# Patient Record
Sex: FEMALE | Race: WHITE | NOT HISPANIC OR LATINO
[De-identification: names, ages, dates, MRNs, and addresses within clinical notes are randomized per-mention and may not be internally consistent; named-entity substitution may affect disease eponyms.]

---

## 2017-01-10 ENCOUNTER — APPOINTMENT (OUTPATIENT)
Dept: PULMONOLOGY | Facility: CLINIC | Age: 77
End: 2017-01-10

## 2017-01-10 VITALS
HEART RATE: 76 BPM | WEIGHT: 272 LBS | HEIGHT: 65 IN | DIASTOLIC BLOOD PRESSURE: 70 MMHG | BODY MASS INDEX: 45.32 KG/M2 | OXYGEN SATURATION: 93 % | SYSTOLIC BLOOD PRESSURE: 140 MMHG

## 2017-02-09 ENCOUNTER — APPOINTMENT (OUTPATIENT)
Dept: FAMILY MEDICINE | Facility: CLINIC | Age: 77
End: 2017-02-09

## 2017-02-13 ENCOUNTER — APPOINTMENT (OUTPATIENT)
Dept: PULMONOLOGY | Facility: CLINIC | Age: 77
End: 2017-02-13

## 2017-02-13 VITALS
HEART RATE: 75 BPM | OXYGEN SATURATION: 91 % | SYSTOLIC BLOOD PRESSURE: 128 MMHG | DIASTOLIC BLOOD PRESSURE: 80 MMHG | WEIGHT: 275 LBS | BODY MASS INDEX: 45.76 KG/M2

## 2017-02-15 ENCOUNTER — FORM ENCOUNTER (OUTPATIENT)
Age: 77
End: 2017-02-15

## 2017-02-16 ENCOUNTER — OUTPATIENT (OUTPATIENT)
Dept: OUTPATIENT SERVICES | Facility: HOSPITAL | Age: 77
LOS: 1 days | End: 2017-02-16
Payer: MEDICARE

## 2017-02-16 ENCOUNTER — APPOINTMENT (OUTPATIENT)
Dept: CT IMAGING | Facility: CLINIC | Age: 77
End: 2017-02-16

## 2017-02-16 ENCOUNTER — APPOINTMENT (OUTPATIENT)
Dept: PULMONOLOGY | Facility: CLINIC | Age: 77
End: 2017-02-16

## 2017-02-16 DIAGNOSIS — Z96.651 PRESENCE OF RIGHT ARTIFICIAL KNEE JOINT: Chronic | ICD-10-CM

## 2017-02-16 DIAGNOSIS — Z90.49 ACQUIRED ABSENCE OF OTHER SPECIFIED PARTS OF DIGESTIVE TRACT: Chronic | ICD-10-CM

## 2017-02-16 DIAGNOSIS — Z98.89 OTHER SPECIFIED POSTPROCEDURAL STATES: Chronic | ICD-10-CM

## 2017-02-16 DIAGNOSIS — Z90.710 ACQUIRED ABSENCE OF BOTH CERVIX AND UTERUS: Chronic | ICD-10-CM

## 2017-02-16 DIAGNOSIS — J84.9 INTERSTITIAL PULMONARY DISEASE, UNSPECIFIED: ICD-10-CM

## 2017-02-16 DIAGNOSIS — Z00.8 ENCOUNTER FOR OTHER GENERAL EXAMINATION: ICD-10-CM

## 2017-02-16 PROCEDURE — 71250 CT THORAX DX C-: CPT

## 2017-02-20 ENCOUNTER — EMERGENCY (EMERGENCY)
Facility: HOSPITAL | Age: 77
LOS: 1 days | Discharge: HOME | End: 2017-02-20
Attending: EMERGENCY MEDICINE
Payer: MEDICARE

## 2017-02-20 VITALS
HEART RATE: 70 BPM | RESPIRATION RATE: 18 BRPM | TEMPERATURE: 98 F | DIASTOLIC BLOOD PRESSURE: 76 MMHG | OXYGEN SATURATION: 93 % | SYSTOLIC BLOOD PRESSURE: 154 MMHG

## 2017-02-20 VITALS
OXYGEN SATURATION: 91 % | DIASTOLIC BLOOD PRESSURE: 70 MMHG | RESPIRATION RATE: 20 BRPM | SYSTOLIC BLOOD PRESSURE: 140 MMHG | HEART RATE: 77 BPM

## 2017-02-20 DIAGNOSIS — E78.5 HYPERLIPIDEMIA, UNSPECIFIED: ICD-10-CM

## 2017-02-20 DIAGNOSIS — R05 COUGH: ICD-10-CM

## 2017-02-20 DIAGNOSIS — Z98.89 OTHER SPECIFIED POSTPROCEDURAL STATES: Chronic | ICD-10-CM

## 2017-02-20 DIAGNOSIS — Z79.82 LONG TERM (CURRENT) USE OF ASPIRIN: ICD-10-CM

## 2017-02-20 DIAGNOSIS — R06.02 SHORTNESS OF BREATH: ICD-10-CM

## 2017-02-20 DIAGNOSIS — E11.9 TYPE 2 DIABETES MELLITUS WITHOUT COMPLICATIONS: ICD-10-CM

## 2017-02-20 DIAGNOSIS — Z90.49 ACQUIRED ABSENCE OF OTHER SPECIFIED PARTS OF DIGESTIVE TRACT: ICD-10-CM

## 2017-02-20 DIAGNOSIS — R06.09 OTHER FORMS OF DYSPNEA: ICD-10-CM

## 2017-02-20 DIAGNOSIS — Z90.710 ACQUIRED ABSENCE OF BOTH CERVIX AND UTERUS: ICD-10-CM

## 2017-02-20 DIAGNOSIS — Z96.651 PRESENCE OF RIGHT ARTIFICIAL KNEE JOINT: ICD-10-CM

## 2017-02-20 DIAGNOSIS — Z98.890 OTHER SPECIFIED POSTPROCEDURAL STATES: ICD-10-CM

## 2017-02-20 DIAGNOSIS — I10 ESSENTIAL (PRIMARY) HYPERTENSION: ICD-10-CM

## 2017-02-20 DIAGNOSIS — F32.9 MAJOR DEPRESSIVE DISORDER, SINGLE EPISODE, UNSPECIFIED: ICD-10-CM

## 2017-02-20 DIAGNOSIS — Z96.651 PRESENCE OF RIGHT ARTIFICIAL KNEE JOINT: Chronic | ICD-10-CM

## 2017-02-20 DIAGNOSIS — R09.02 HYPOXEMIA: ICD-10-CM

## 2017-02-20 DIAGNOSIS — Z87.891 PERSONAL HISTORY OF NICOTINE DEPENDENCE: ICD-10-CM

## 2017-02-20 DIAGNOSIS — Z90.710 ACQUIRED ABSENCE OF BOTH CERVIX AND UTERUS: Chronic | ICD-10-CM

## 2017-02-20 DIAGNOSIS — Z90.49 ACQUIRED ABSENCE OF OTHER SPECIFIED PARTS OF DIGESTIVE TRACT: Chronic | ICD-10-CM

## 2017-02-20 DIAGNOSIS — G47.33 OBSTRUCTIVE SLEEP APNEA (ADULT) (PEDIATRIC): ICD-10-CM

## 2017-02-20 LAB
ALBUMIN SERPL ELPH-MCNC: 4.2 G/DL — SIGNIFICANT CHANGE UP (ref 3.3–5.2)
ALP SERPL-CCNC: 109 U/L — SIGNIFICANT CHANGE UP (ref 40–120)
ALT FLD-CCNC: 21 U/L — SIGNIFICANT CHANGE UP
ANION GAP SERPL CALC-SCNC: 13 MMOL/L — SIGNIFICANT CHANGE UP (ref 5–17)
APTT BLD: 26 SEC — LOW (ref 27.5–37.4)
AST SERPL-CCNC: 15 U/L — SIGNIFICANT CHANGE UP
BASE EXCESS BLDA CALC-SCNC: 2.3 MMOL/L — SIGNIFICANT CHANGE UP (ref -3–3)
BASOPHILS # BLD AUTO: 0.1 K/UL — SIGNIFICANT CHANGE UP (ref 0–0.2)
BASOPHILS NFR BLD AUTO: 0.8 % — SIGNIFICANT CHANGE UP (ref 0–2)
BILIRUB SERPL-MCNC: 0.4 MG/DL — SIGNIFICANT CHANGE UP (ref 0.4–2)
BLOOD GAS COMMENTS ARTERIAL: SIGNIFICANT CHANGE UP
BUN SERPL-MCNC: 27 MG/DL — HIGH (ref 8–20)
CALCIUM SERPL-MCNC: 9.7 MG/DL — SIGNIFICANT CHANGE UP (ref 8.6–10.2)
CHLORIDE SERPL-SCNC: 97 MMOL/L — LOW (ref 98–107)
CK SERPL-CCNC: 99 U/L — SIGNIFICANT CHANGE UP (ref 25–170)
CO2 SERPL-SCNC: 30 MMOL/L — HIGH (ref 22–29)
CREAT SERPL-MCNC: 1.07 MG/DL — SIGNIFICANT CHANGE UP (ref 0.5–1.3)
D DIMER BLD IA.RAPID-MCNC: 256 NG/ML DDU — HIGH
EOSINOPHIL # BLD AUTO: 0.4 K/UL — SIGNIFICANT CHANGE UP (ref 0–0.5)
EOSINOPHIL NFR BLD AUTO: 2.9 % — SIGNIFICANT CHANGE UP (ref 0–6)
GAS PNL BLDA: SIGNIFICANT CHANGE UP
GLUCOSE SERPL-MCNC: 169 MG/DL — HIGH (ref 70–115)
HCO3 BLDA-SCNC: 26 MMOL/L — SIGNIFICANT CHANGE UP (ref 20–26)
HCT VFR BLD CALC: 44.9 % — SIGNIFICANT CHANGE UP (ref 37–47)
HGB BLD-MCNC: 14.8 G/DL — SIGNIFICANT CHANGE UP (ref 12–16)
HOROWITZ INDEX BLDA+IHG-RTO: SIGNIFICANT CHANGE UP
INR BLD: 1 RATIO — SIGNIFICANT CHANGE UP (ref 0.88–1.16)
LYMPHOCYTES # BLD AUTO: 29.3 % — SIGNIFICANT CHANGE UP (ref 20–55)
LYMPHOCYTES # BLD AUTO: 3.6 K/UL — SIGNIFICANT CHANGE UP (ref 1–4.8)
MCHC RBC-ENTMCNC: 29.5 PG — SIGNIFICANT CHANGE UP (ref 27–31)
MCHC RBC-ENTMCNC: 33 G/DL — SIGNIFICANT CHANGE UP (ref 32–36)
MCV RBC AUTO: 89.4 FL — SIGNIFICANT CHANGE UP (ref 81–99)
MONOCYTES # BLD AUTO: 0.9 K/UL — HIGH (ref 0–0.8)
MONOCYTES NFR BLD AUTO: 7.6 % — SIGNIFICANT CHANGE UP (ref 3–10)
NEUTROPHILS # BLD AUTO: 7.2 K/UL — SIGNIFICANT CHANGE UP (ref 1.8–8)
NEUTROPHILS NFR BLD AUTO: 58.9 % — SIGNIFICANT CHANGE UP (ref 37–73)
NT-PROBNP SERPL-SCNC: 48 PG/ML — SIGNIFICANT CHANGE UP (ref 0–300)
PCO2 BLDA: 39 MMHG — SIGNIFICANT CHANGE UP (ref 35–45)
PH BLDA: 7.44 — SIGNIFICANT CHANGE UP (ref 7.35–7.45)
PLATELET # BLD AUTO: 321 K/UL — SIGNIFICANT CHANGE UP (ref 150–400)
PO2 BLDA: 63 MMHG — LOW (ref 83–108)
POTASSIUM SERPL-MCNC: 5 MMOL/L — SIGNIFICANT CHANGE UP (ref 3.5–5.3)
POTASSIUM SERPL-SCNC: 5 MMOL/L — SIGNIFICANT CHANGE UP (ref 3.5–5.3)
PROT SERPL-MCNC: 7.7 G/DL — SIGNIFICANT CHANGE UP (ref 6.6–8.7)
PROTHROM AB SERPL-ACNC: 11 SEC — SIGNIFICANT CHANGE UP (ref 10–13.1)
RBC # BLD: 5.02 M/UL — SIGNIFICANT CHANGE UP (ref 4.4–5.2)
RBC # FLD: 14.7 % — SIGNIFICANT CHANGE UP (ref 11–15.6)
SAO2 % BLDA: 93 % — LOW (ref 95–99)
SODIUM SERPL-SCNC: 140 MMOL/L — SIGNIFICANT CHANGE UP (ref 135–145)
TROPONIN T SERPL-MCNC: <0.01 NG/ML — SIGNIFICANT CHANGE UP (ref 0–0.06)
WBC # BLD: 12.2 K/UL — HIGH (ref 4.8–10.8)
WBC # FLD AUTO: 12.2 K/UL — HIGH (ref 4.8–10.8)

## 2017-02-20 PROCEDURE — 71275 CT ANGIOGRAPHY CHEST: CPT | Mod: 26

## 2017-02-20 PROCEDURE — 99053 MED SERV 10PM-8AM 24 HR FAC: CPT

## 2017-02-20 PROCEDURE — 83880 ASSAY OF NATRIURETIC PEPTIDE: CPT

## 2017-02-20 PROCEDURE — 99284 EMERGENCY DEPT VISIT MOD MDM: CPT | Mod: 25

## 2017-02-20 PROCEDURE — 85379 FIBRIN DEGRADATION QUANT: CPT

## 2017-02-20 PROCEDURE — 85027 COMPLETE CBC AUTOMATED: CPT

## 2017-02-20 PROCEDURE — 94640 AIRWAY INHALATION TREATMENT: CPT

## 2017-02-20 PROCEDURE — 84484 ASSAY OF TROPONIN QUANT: CPT

## 2017-02-20 PROCEDURE — 93010 ELECTROCARDIOGRAM REPORT: CPT

## 2017-02-20 PROCEDURE — 93970 EXTREMITY STUDY: CPT

## 2017-02-20 PROCEDURE — 85610 PROTHROMBIN TIME: CPT

## 2017-02-20 PROCEDURE — 93970 EXTREMITY STUDY: CPT | Mod: 26

## 2017-02-20 PROCEDURE — 82803 BLOOD GASES ANY COMBINATION: CPT

## 2017-02-20 PROCEDURE — 82550 ASSAY OF CK (CPK): CPT

## 2017-02-20 PROCEDURE — 99285 EMERGENCY DEPT VISIT HI MDM: CPT | Mod: 25

## 2017-02-20 PROCEDURE — 85730 THROMBOPLASTIN TIME PARTIAL: CPT

## 2017-02-20 PROCEDURE — 71010: CPT | Mod: 26

## 2017-02-20 PROCEDURE — 80053 COMPREHEN METABOLIC PANEL: CPT

## 2017-02-20 PROCEDURE — 93005 ELECTROCARDIOGRAM TRACING: CPT

## 2017-02-20 PROCEDURE — 71045 X-RAY EXAM CHEST 1 VIEW: CPT

## 2017-02-20 PROCEDURE — 99223 1ST HOSP IP/OBS HIGH 75: CPT

## 2017-02-20 PROCEDURE — 71275 CT ANGIOGRAPHY CHEST: CPT

## 2017-02-20 RX ORDER — ALBUTEROL 90 UG/1
2.5 AEROSOL, METERED ORAL ONCE
Qty: 0 | Refills: 0 | Status: COMPLETED | OUTPATIENT
Start: 2017-02-20 | End: 2017-02-20

## 2017-02-20 RX ORDER — IPRATROPIUM/ALBUTEROL SULFATE 18-103MCG
3 AEROSOL WITH ADAPTER (GRAM) INHALATION ONCE
Qty: 0 | Refills: 0 | Status: COMPLETED | OUTPATIENT
Start: 2017-02-20 | End: 2017-02-20

## 2017-02-20 RX ORDER — SODIUM CHLORIDE 9 MG/ML
3 INJECTION INTRAMUSCULAR; INTRAVENOUS; SUBCUTANEOUS ONCE
Qty: 0 | Refills: 0 | Status: COMPLETED | OUTPATIENT
Start: 2017-02-20 | End: 2017-02-20

## 2017-02-20 RX ADMIN — ALBUTEROL 2.5 MILLIGRAM(S): 90 AEROSOL, METERED ORAL at 09:15

## 2017-02-20 RX ADMIN — Medication 3 MILLILITER(S): at 09:15

## 2017-02-20 RX ADMIN — SODIUM CHLORIDE 3 MILLILITER(S): 9 INJECTION INTRAMUSCULAR; INTRAVENOUS; SUBCUTANEOUS at 09:10

## 2017-02-20 NOTE — ED ADULT NURSE NOTE - PMH
Cataract    Depression    Diabetes    DJD (degenerative joint disease)    Dyslipidemia    Edema of both legs    Hypertension    Venous insufficiency of both lower extremities

## 2017-02-20 NOTE — CONSULT NOTE ADULT - ASSESSMENT
Chronic dyspnea secondary to obesity and deconditioning with mild hypoxemia  If CTA normal could dischange with f/o with Dr White  Discussed with Dr Tovar

## 2017-02-20 NOTE — ED ADULT NURSE REASSESSMENT NOTE - NS ED NURSE REASSESS COMMENT FT1
90% o2 sat has been recent normal and being worked up by dr ruffin, pt states that is good for her and no distress on room air.

## 2017-02-20 NOTE — CONSULT NOTE ADULT - SUBJECTIVE AND OBJECTIVE BOX
PULMONARY CONSULT NOTE      KENDALL MERIDA-[id number]    Patient is a 76y old  Female who presents with a chief complaint of     HISTORY OF PRESENT ILLNESS:  75 yo w/f with hx of obesity, JAVON on CPAP14, chronic SOB x 6months worsening over past several days. w/u in our office showed normal PFT's. Denies cough or wheeze    MEDICATIONS  (STANDING):      MEDICATIONS  (PRN):      Allergies    ibuprofen (Swelling)  Motrin (Angioedema)    Intolerances        PAST MEDICAL & SURGICAL HISTORY:  Venous insufficiency of both lower extremities  Edema of both legs  Cataract  Depression  DJD (degenerative joint disease)  Dyslipidemia  Diabetes  Hypertension  H/O total hysterectomy  History of bladder surgery: failed sling placement  History of total knee replacement, right: 2015  S/P cholecystectomy  S/P arthroscopy of shoulder: R      FAMILY HISTORY:  Family history of coronary artery disease (Father, Mother, Sibling)      SOCIAL HISTORY  Smoking History:     REVIEW OF SYSTEMS:    CONSTITUTIONAL:  No fevers, chills, sweats    HEENT:  Eyes:  No diplopia or blurred vision. ENT:  No earache, sore throat or runny nose. sinus headache or postnasl drip    CARDIOVASCULAR:  No pressure, squeezing, tightness, or heaviness about the chest; no palpitations, leg swelling, orthopnea or PND    RESPIRATORY:  No cough, shortness of breath, PND or orthopnea. Mild SOBOE    GASTROINTESTINAL:  No abdominal pain, nausea, vomiting or diarrhea.    GENITOURINARY:  No dysuria, frequency or urgency.    NEUROLOGIC:  No paresthesias, fasciculations, seizures or weakness.    PSYCHIATRIC:  No disorder of thought or mood.    Vital Signs Last 24 Hrs  T(C): 36.9, Max: 36.9 (02-20 @ 11:25)  T(F): 98.4, Max: 98.4 (02-20 @ 11:25)  HR: 70 (70 - 70)  BP: 142/71 (142/71 - 154/76)  BP(mean): --  RR: 18 (18 - 18)  SpO2: 96% (93% - 96%)    PHYSICAL EXAMINATION:    GENERAL: The patient is a well-developed, well-nourished _____in no apparent distress.     HEENT: Head is normocephalic and atraumatic. Extraocular muscles are intact. Mucous membranes are moist.     NECK: Supple.     LUNGS: Clear to auscultation without wheezing, rales, or rhonchi. Respirations unlabored    HEART: Regular rate and rhythm without murmur.    ABDOMEN: Soft, nontender, and nondistended.  No hepatosplenomegaly is noted.    EXTREMITIES: Without any cyanosis, clubbing, rash, lesions or edema.    NEUROLOGIC: Grossly intact.      LABS:                        14.8   12.2  )-----------( 321      ( 20 Feb 2017 09:04 )             44.9     20 Feb 2017 09:04    140    |  97     |  27.0   ----------------------------<  169    5.0     |  30.0   |  1.07     Ca    9.7        20 Feb 2017 09:04    TPro  7.7    /  Alb  4.2    /  TBili  0.4    /  DBili  x      /  AST  15     /  ALT  21     /  AlkPhos  109    20 Feb 2017 09:04    PT/INR - ( 20 Feb 2017 09:04 )   PT: 11.0 sec;   INR: 1.00 ratio         PTT - ( 20 Feb 2017 09:04 )  PTT:26.0 sec    ABG - ( 20 Feb 2017 08:56 )  pH: 7.44  /  pCO2: 39    /  pO2: 63    / HCO3: 26    / Base Excess: 2.3   /  SaO2: 93                CARDIAC MARKERS ( 20 Feb 2017 09:04 )  x     / <0.01 ng/mL / 99 U/L / x     / x          D-Dimer Assay, Quantitative: 256 ng/mL DDU (02-20 @ 09:04)    Serum Pro-Brain Natriuretic Peptide: 48 pg/mL (02-20 @ 09:04)          MICROBIOLOGY:    RADIOLOGY & ADDITIONAL STUDIES:   EXAM:  US DPLX LWR EXT VEINS COMPL BI                          PROCEDURE DATE:  02/20/2017        INTERPRETATION:  HISTORY:  Shortness of breath.      TECHNIQUE:  Ultrasound of the bilateral lower extremities was performed   utilizing real time gray scale, color Doppler and augmentation technique.    COMPARISON EXAMINATION: 6/1/2016.      FINDINGS:  The bilateral common femoral veins, superficial femoral veins   and popliteal veins demonstrate normal color Doppler flow,   compressibility, and augmentation.    The calf veins are patent, demonstrating normal compressibility.    IMPRESSION: No evidence of deep venous thrombosis of either lower   extremity.        EXAM:  CT CHEST        PROCEDURE DATE:  02/16/2017        INTERPRETATION:  EXAMINATION: CT CHEST    CLINICAL INDICATION: Dyspnea.    TECHNIQUE: Noncontrast CT of the chest was obtained.  Prone and supine   images were obtained. MIP Images were reconstructed.    COMPARISON: 11/4/2016.    FINDINGS:     AIRWAYS AND LUNGS: The central tracheobronchial tree is patent.    Dependent reticular opacity is nearly resolved with prone imaging.   Minimal bilateral lower lobe bronchiectasis.    MEDIASTINUM AND PLEURA: There are no enlarged mediastinal, hilar or   axillary lymph nodes. The visualized portion of the thyroid gland is   heterogeneous. There is no pleural effusion. There is no pneumothorax.      HEART AND VESSELS: There is cardiomegaly.  There are atherosclerotic   calcifications of the aorta.  There is no pericardial effusion.         UPPER ABDOMEN: Images of the upper abdomen demonstrate cholecystectomy   clips..     BONES AND SOFT TISSUES: There are mild degenerative changes of the spine.    The soft tissues are unremarkable.    TUBES/LINES: None.    IMPRESSION:   Dependent reticular opacity nearly resolved with prone imaging and likely   representing dependent atelectasis.    Minimal bilateral lower lobe bronchiectasis.      JUDI FIELDS M.D., ATTENDING RADIOLOGIST  This document has been electronically signed. Feb 16 2017  2:50PM     EXAM:  CHEST SINGLE VIEW FRONTAL                          PROCEDURE DATE:  02/20/2017        INTERPRETATION:  CHEST AP PORTABLE:    History: sob.     Date and time of exam: 2/20/2017 9:08 AM.    Technique: A single AP view of the chest was obtained.    Comparison exam: 4/8/2015.    Findings:  The heart is enlarged. The lungs are clear. No hilar or mediastinal   abnormality. No evidence of a pleural effusion. There are degenerative   changes in the spine..    Impression:  Cardiomegaly. Clear lungs. Stable exam without significant change since   the previous study..                MALA PERDUE M.D., ATTENDING RADIOLOGIST  This document has been electronically signed. Feb 20 2017  9:12AM PULMONARY CONSULT NOTE      KENDALL MERIDA-[id number]    Patient is a 76y old  Female who presents with a chief complaint of     HISTORY OF PRESENT ILLNESS:    77 yo w/f with hx of obesity, JAVON on CPAP14, chronic SOB x 6months worsening over past several days. w/u in our office showed normal PFT's. Denies cough or wheeze. No pain    MEDICATIONS  (STANDING):      MEDICATIONS  (PRN):      Allergies    ibuprofen (Swelling)  Motrin (Angioedema)    Intolerances        PAST MEDICAL & SURGICAL HISTORY:  Venous insufficiency of both lower extremities  Edema of both legs  Cataract  Depression  DJD (degenerative joint disease)  Dyslipidemia  Diabetes  Hypertension  H/O total hysterectomy  History of bladder surgery: failed sling placement  History of total knee replacement, right: 2015  S/P cholecystectomy  S/P arthroscopy of shoulder: R      FAMILY HISTORY:  Family history of coronary artery disease (Father, Mother, Sibling)      SOCIAL HISTORY  Smoking History:     REVIEW OF SYSTEMS:    CONSTITUTIONAL:  No fevers, chills, sweats    HEENT:  Eyes:  No diplopia or blurred vision. ENT:  No earache, sore throat or runny nose. sinus headache or postnasl drip    CARDIOVASCULAR:  No pressure, squeezing, tightness, or heaviness about the chest; no palpitations, leg swelling, orthopnea or PND    RESPIRATORY:  No cough, shortness of breath, PND or orthopnea. Mild SOBOE    GASTROINTESTINAL:  No abdominal pain, nausea, vomiting or diarrhea.    GENITOURINARY:  No dysuria, frequency or urgency.    NEUROLOGIC:  No paresthesias, fasciculations, seizures or weakness.    PSYCHIATRIC:  No disorder of thought or mood.    Vital Signs Last 24 Hrs  T(C): 36.9, Max: 36.9 (02-20 @ 11:25)  T(F): 98.4, Max: 98.4 (02-20 @ 11:25)  HR: 70 (70 - 70)  BP: 142/71 (142/71 - 154/76)  BP(mean): --  RR: 18 (18 - 18)  SpO2: 96% (93% - 96%)    PHYSICAL EXAMINATION:    GENERAL: The patient is a well-developed, well-nourished obese in no apparent distress.     HEENT: Head is normocephalic and atraumatic. Extraocular muscles are intact. Mucous membranes are moist.     NECK: Supple.     LUNGS: Clear to auscultation without wheezing, rales, or rhonchi. Respirations unlabored    HEART: Regular rate and rhythm without murmur.    ABDOMEN: Soft, nontender, and nondistended.  No hepatosplenomegaly is noted.Obese    EXTREMITIES: Without any cyanosis, clubbing, rash, lesions or edema.    NEUROLOGIC: Grossly intact.      LABS:                        14.8   12.2  )-----------( 321      ( 20 Feb 2017 09:04 )             44.9     20 Feb 2017 09:04    140    |  97     |  27.0   ----------------------------<  169    5.0     |  30.0   |  1.07     Ca    9.7        20 Feb 2017 09:04    TPro  7.7    /  Alb  4.2    /  TBili  0.4    /  DBili  x      /  AST  15     /  ALT  21     /  AlkPhos  109    20 Feb 2017 09:04    PT/INR - ( 20 Feb 2017 09:04 )   PT: 11.0 sec;   INR: 1.00 ratio         PTT - ( 20 Feb 2017 09:04 )  PTT:26.0 sec    ABG - ( 20 Feb 2017 08:56 )  pH: 7.44  /  pCO2: 39    /  pO2: 63    / HCO3: 26    / Base Excess: 2.3   /  SaO2: 93                CARDIAC MARKERS ( 20 Feb 2017 09:04 )  x     / <0.01 ng/mL / 99 U/L / x     / x          D-Dimer Assay, Quantitative: 256 ng/mL DDU (02-20 @ 09:04)    Serum Pro-Brain Natriuretic Peptide: 48 pg/mL (02-20 @ 09:04)          MICROBIOLOGY:    RADIOLOGY & ADDITIONAL STUDIES:   EXAM:  US DPLX LWR EXT VEINS COMPL BI                          PROCEDURE DATE:  02/20/2017        INTERPRETATION:  HISTORY:  Shortness of breath.      TECHNIQUE:  Ultrasound of the bilateral lower extremities was performed   utilizing real time gray scale, color Doppler and augmentation technique.    COMPARISON EXAMINATION: 6/1/2016.      FINDINGS:  The bilateral common femoral veins, superficial femoral veins   and popliteal veins demonstrate normal color Doppler flow,   compressibility, and augmentation.    The calf veins are patent, demonstrating normal compressibility.    IMPRESSION: No evidence of deep venous thrombosis of either lower   extremity.        EXAM:  CT CHEST        PROCEDURE DATE:  02/16/2017        INTERPRETATION:  EXAMINATION: CT CHEST    CLINICAL INDICATION: Dyspnea.    TECHNIQUE: Noncontrast CT of the chest was obtained.  Prone and supine   images were obtained. MIP Images were reconstructed.    COMPARISON: 11/4/2016.    FINDINGS:     AIRWAYS AND LUNGS: The central tracheobronchial tree is patent.    Dependent reticular opacity is nearly resolved with prone imaging.   Minimal bilateral lower lobe bronchiectasis.    MEDIASTINUM AND PLEURA: There are no enlarged mediastinal, hilar or   axillary lymph nodes. The visualized portion of the thyroid gland is   heterogeneous. There is no pleural effusion. There is no pneumothorax.      HEART AND VESSELS: There is cardiomegaly.  There are atherosclerotic   calcifications of the aorta.  There is no pericardial effusion.         UPPER ABDOMEN: Images of the upper abdomen demonstrate cholecystectomy   clips..     BONES AND SOFT TISSUES: There are mild degenerative changes of the spine.    The soft tissues are unremarkable.    TUBES/LINES: None.    IMPRESSION:   Dependent reticular opacity nearly resolved with prone imaging and likely   representing dependent atelectasis.    Minimal bilateral lower lobe bronchiectasis.      JUDI FIELDS M.D., ATTENDING RADIOLOGIST  This document has been electronically signed. Feb 16 2017  2:50PM     EXAM:  CHEST SINGLE VIEW FRONTAL                          PROCEDURE DATE:  02/20/2017        INTERPRETATION:  CHEST AP PORTABLE:    History: sob.     Date and time of exam: 2/20/2017 9:08 AM.    Technique: A single AP view of the chest was obtained.    Comparison exam: 4/8/2015.    Findings:  The heart is enlarged. The lungs are clear. No hilar or mediastinal   abnormality. No evidence of a pleural effusion. There are degenerative   changes in the spine..    Impression:  Cardiomegaly. Clear lungs. Stable exam without significant change since   the previous study..                MALA PERDUE M.D., ATTENDING RADIOLOGIST  This document has been electronically signed. Feb 20 2017  9:12AM    CTA today- N0 gross emboli- official report pending      [facility] 7/15/16 Normal

## 2017-02-20 NOTE — ED ADULT TRIAGE NOTE - CHIEF COMPLAINT QUOTE
Patient arrived to ED today with c/o SOB.  Patient states she has had for the past couple of weeks that has increased.

## 2017-02-20 NOTE — ED ADULT NURSE NOTE - PSH
H/O total hysterectomy    History of bladder surgery  failed sling placement  History of total knee replacement, right  2015  S/P arthroscopy of shoulder  R  S/P cholecystectomy

## 2017-02-22 NOTE — ED PROVIDER NOTE - OBJECTIVE STATEMENT
77 yo female obese on home cpap comes to ed with worsening shortness of breath over the last 6 months; pt recently workup by Dr White of pulmonary  with ct scan of chest

## 2017-02-23 ENCOUNTER — APPOINTMENT (OUTPATIENT)
Dept: PULMONOLOGY | Facility: CLINIC | Age: 77
End: 2017-02-23

## 2017-02-28 RX ORDER — DOXYCYCLINE HYCLATE 100 MG/1
100 CAPSULE ORAL
Refills: 0 | Status: DISCONTINUED | COMMUNITY
End: 2017-02-28

## 2017-02-28 RX ORDER — BENZONATATE 200 MG/1
200 CAPSULE ORAL
Refills: 0 | Status: DISCONTINUED | COMMUNITY
Start: 2017-02-13 | End: 2017-02-28

## 2017-03-01 ENCOUNTER — APPOINTMENT (OUTPATIENT)
Dept: PULMONOLOGY | Facility: CLINIC | Age: 77
End: 2017-03-01

## 2017-03-01 VITALS
HEART RATE: 78 BPM | DIASTOLIC BLOOD PRESSURE: 80 MMHG | OXYGEN SATURATION: 93 % | RESPIRATION RATE: 16 BRPM | SYSTOLIC BLOOD PRESSURE: 128 MMHG

## 2017-03-16 ENCOUNTER — APPOINTMENT (OUTPATIENT)
Dept: FAMILY MEDICINE | Facility: CLINIC | Age: 77
End: 2017-03-16

## 2017-03-16 VITALS
SYSTOLIC BLOOD PRESSURE: 126 MMHG | HEIGHT: 65 IN | HEART RATE: 76 BPM | WEIGHT: 273 LBS | BODY MASS INDEX: 45.48 KG/M2 | DIASTOLIC BLOOD PRESSURE: 80 MMHG | OXYGEN SATURATION: 98 %

## 2017-03-16 DIAGNOSIS — Z23 ENCOUNTER FOR IMMUNIZATION: ICD-10-CM

## 2017-03-27 ENCOUNTER — APPOINTMENT (OUTPATIENT)
Dept: PULMONOLOGY | Facility: CLINIC | Age: 77
End: 2017-03-27

## 2017-03-27 VITALS — BODY MASS INDEX: 45.65 KG/M2 | WEIGHT: 274 LBS | HEIGHT: 65 IN | OXYGEN SATURATION: 92 % | HEART RATE: 77 BPM

## 2017-03-31 ENCOUNTER — APPOINTMENT (OUTPATIENT)
Dept: ORTHOPEDIC SURGERY | Facility: CLINIC | Age: 77
End: 2017-03-31

## 2017-03-31 VITALS
DIASTOLIC BLOOD PRESSURE: 54 MMHG | WEIGHT: 274 LBS | BODY MASS INDEX: 45.65 KG/M2 | HEIGHT: 65 IN | SYSTOLIC BLOOD PRESSURE: 109 MMHG | TEMPERATURE: 97.8 F | HEART RATE: 77 BPM

## 2017-04-20 ENCOUNTER — MEDICATION RENEWAL (OUTPATIENT)
Age: 77
End: 2017-04-20

## 2017-04-20 LAB
ALBUMIN SERPL ELPH-MCNC: 4.1 G/DL
ALP BLD-CCNC: 102 U/L
ALT SERPL-CCNC: 19 U/L
ANION GAP SERPL CALC-SCNC: 17 MMOL/L
AST SERPL-CCNC: 20 U/L
BILIRUB SERPL-MCNC: 0.5 MG/DL
BUN SERPL-MCNC: 24 MG/DL
CALCIUM SERPL-MCNC: 9.7 MG/DL
CHLORIDE SERPL-SCNC: 97 MMOL/L
CHOLEST SERPL-MCNC: 144 MG/DL
CHOLEST/HDLC SERPL: 2.4 RATIO
CO2 SERPL-SCNC: 24 MMOL/L
CREAT SERPL-MCNC: 1.23 MG/DL
GLUCOSE SERPL-MCNC: 148 MG/DL
HBA1C MFR BLD HPLC: 8.3 %
HDLC SERPL-MCNC: 59 MG/DL
LDLC SERPL CALC-MCNC: 68 MG/DL
POTASSIUM SERPL-SCNC: 4.9 MMOL/L
PROT SERPL-MCNC: 7.6 G/DL
SODIUM SERPL-SCNC: 138 MMOL/L
T4 FREE SERPL-MCNC: 1.4 NG/DL
TRIGL SERPL-MCNC: 83 MG/DL
TSH SERPL-ACNC: 2.45 UIU/ML

## 2017-06-19 ENCOUNTER — APPOINTMENT (OUTPATIENT)
Dept: FAMILY MEDICINE | Facility: CLINIC | Age: 77
End: 2017-06-19

## 2017-06-19 VITALS
DIASTOLIC BLOOD PRESSURE: 70 MMHG | SYSTOLIC BLOOD PRESSURE: 122 MMHG | WEIGHT: 273 LBS | HEIGHT: 65 IN | BODY MASS INDEX: 45.48 KG/M2 | HEART RATE: 68 BPM

## 2017-06-19 RX ORDER — PREDNISONE 10 MG/1
10 TABLET ORAL
Refills: 0 | Status: DISCONTINUED | COMMUNITY
End: 2017-06-19

## 2017-06-30 ENCOUNTER — APPOINTMENT (OUTPATIENT)
Dept: ORTHOPEDIC SURGERY | Facility: CLINIC | Age: 77
End: 2017-06-30

## 2017-06-30 ENCOUNTER — APPOINTMENT (OUTPATIENT)
Dept: CARDIOLOGY | Facility: CLINIC | Age: 77
End: 2017-06-30

## 2017-06-30 VITALS
WEIGHT: 273 LBS | DIASTOLIC BLOOD PRESSURE: 72 MMHG | SYSTOLIC BLOOD PRESSURE: 125 MMHG | HEIGHT: 65 IN | TEMPERATURE: 97.9 F | HEART RATE: 68 BPM | BODY MASS INDEX: 45.48 KG/M2

## 2017-07-10 ENCOUNTER — OTHER (OUTPATIENT)
Age: 77
End: 2017-07-10

## 2017-08-11 ENCOUNTER — APPOINTMENT (OUTPATIENT)
Dept: PULMONOLOGY | Facility: CLINIC | Age: 77
End: 2017-08-11
Payer: MEDICARE

## 2017-08-11 VITALS — BODY MASS INDEX: 45.1 KG/M2 | WEIGHT: 271 LBS

## 2017-08-11 VITALS — RESPIRATION RATE: 14 BRPM | OXYGEN SATURATION: 92 %

## 2017-08-11 VITALS — SYSTOLIC BLOOD PRESSURE: 120 MMHG | DIASTOLIC BLOOD PRESSURE: 76 MMHG

## 2017-08-11 PROCEDURE — 85018 HEMOGLOBIN: CPT | Mod: QW

## 2017-08-11 PROCEDURE — 94727 GAS DIL/WSHOT DETER LNG VOL: CPT

## 2017-08-11 PROCEDURE — 94010 BREATHING CAPACITY TEST: CPT

## 2017-08-11 PROCEDURE — 94729 DIFFUSING CAPACITY: CPT

## 2017-08-11 PROCEDURE — 99215 OFFICE O/P EST HI 40 MIN: CPT | Mod: 25

## 2017-08-15 LAB
ALBUMIN SERPL ELPH-MCNC: 3.9 G/DL
ALP BLD-CCNC: 101 U/L
ALT SERPL-CCNC: 17 U/L
ANION GAP SERPL CALC-SCNC: 14 MMOL/L
AST SERPL-CCNC: 18 U/L
BASOPHILS # BLD AUTO: 0.06 K/UL
BASOPHILS NFR BLD AUTO: 0.7 %
BILIRUB SERPL-MCNC: 0.4 MG/DL
BUN SERPL-MCNC: 16 MG/DL
CALCIUM SERPL-MCNC: 9.3 MG/DL
CHLORIDE SERPL-SCNC: 100 MMOL/L
CHOLEST SERPL-MCNC: 160 MG/DL
CHOLEST/HDLC SERPL: 3 RATIO
CO2 SERPL-SCNC: 24 MMOL/L
CREAT SERPL-MCNC: 0.98 MG/DL
EOSINOPHIL # BLD AUTO: 0.24 K/UL
EOSINOPHIL NFR BLD AUTO: 2.6 %
GLUCOSE SERPL-MCNC: 171 MG/DL
HCT VFR BLD CALC: 42.1 %
HDLC SERPL-MCNC: 54 MG/DL
HGB BLD-MCNC: 13.4 G/DL
IMM GRANULOCYTES NFR BLD AUTO: 0.1 %
LDLC SERPL CALC-MCNC: 81 MG/DL
LYMPHOCYTES # BLD AUTO: 2.4 K/UL
LYMPHOCYTES NFR BLD AUTO: 26.5 %
MAN DIFF?: NORMAL
MCHC RBC-ENTMCNC: 28.9 PG
MCHC RBC-ENTMCNC: 31.8 GM/DL
MCV RBC AUTO: 90.7 FL
MONOCYTES # BLD AUTO: 0.55 K/UL
MONOCYTES NFR BLD AUTO: 6.1 %
NEUTROPHILS # BLD AUTO: 5.81 K/UL
NEUTROPHILS NFR BLD AUTO: 64 %
PLATELET # BLD AUTO: 327 K/UL
POTASSIUM SERPL-SCNC: 4.7 MMOL/L
PROT SERPL-MCNC: 7.9 G/DL
RBC # BLD: 4.64 M/UL
RBC # FLD: 14.9 %
SODIUM SERPL-SCNC: 138 MMOL/L
TRIGL SERPL-MCNC: 127 MG/DL
WBC # FLD AUTO: 9.07 K/UL

## 2017-09-11 ENCOUNTER — APPOINTMENT (OUTPATIENT)
Dept: ORTHOPEDIC SURGERY | Facility: CLINIC | Age: 77
End: 2017-09-11
Payer: MEDICARE

## 2017-09-11 VITALS — WEIGHT: 270 LBS | HEIGHT: 65 IN | BODY MASS INDEX: 44.98 KG/M2

## 2017-09-11 VITALS — SYSTOLIC BLOOD PRESSURE: 141 MMHG | HEART RATE: 65 BPM | DIASTOLIC BLOOD PRESSURE: 66 MMHG

## 2017-09-11 PROCEDURE — 99214 OFFICE O/P EST MOD 30 MIN: CPT | Mod: 25

## 2017-09-11 PROCEDURE — 20610 DRAIN/INJ JOINT/BURSA W/O US: CPT | Mod: LT

## 2017-09-18 ENCOUNTER — APPOINTMENT (OUTPATIENT)
Dept: FAMILY MEDICINE | Facility: CLINIC | Age: 77
End: 2017-09-18
Payer: MEDICARE

## 2017-09-18 VITALS
BODY MASS INDEX: 45.1 KG/M2 | WEIGHT: 271 LBS | DIASTOLIC BLOOD PRESSURE: 80 MMHG | SYSTOLIC BLOOD PRESSURE: 130 MMHG | HEART RATE: 72 BPM

## 2017-09-18 VITALS — TEMPERATURE: 98.4 F

## 2017-09-18 PROCEDURE — G0008: CPT

## 2017-09-18 PROCEDURE — 99214 OFFICE O/P EST MOD 30 MIN: CPT | Mod: 25

## 2017-09-18 PROCEDURE — 90662 IIV NO PRSV INCREASED AG IM: CPT

## 2017-09-25 ENCOUNTER — APPOINTMENT (OUTPATIENT)
Dept: PULMONOLOGY | Facility: CLINIC | Age: 77
End: 2017-09-25
Payer: MEDICARE

## 2017-09-25 VITALS
RESPIRATION RATE: 16 BRPM | WEIGHT: 269 LBS | OXYGEN SATURATION: 93 % | SYSTOLIC BLOOD PRESSURE: 122 MMHG | BODY MASS INDEX: 44.76 KG/M2 | DIASTOLIC BLOOD PRESSURE: 80 MMHG | HEART RATE: 65 BPM

## 2017-09-25 PROCEDURE — 99214 OFFICE O/P EST MOD 30 MIN: CPT

## 2017-10-06 ENCOUNTER — APPOINTMENT (OUTPATIENT)
Dept: CARDIOLOGY | Facility: CLINIC | Age: 77
End: 2017-10-06
Payer: MEDICARE

## 2017-10-06 VITALS
HEART RATE: 62 BPM | OXYGEN SATURATION: 94 % | SYSTOLIC BLOOD PRESSURE: 133 MMHG | WEIGHT: 271 LBS | DIASTOLIC BLOOD PRESSURE: 66 MMHG | BODY MASS INDEX: 45.1 KG/M2

## 2017-10-06 PROCEDURE — 99214 OFFICE O/P EST MOD 30 MIN: CPT

## 2017-10-06 PROCEDURE — 93000 ELECTROCARDIOGRAM COMPLETE: CPT

## 2017-10-21 ENCOUNTER — NON-APPOINTMENT (OUTPATIENT)
Age: 77
End: 2017-10-21

## 2017-10-23 ENCOUNTER — INPATIENT (INPATIENT)
Facility: HOSPITAL | Age: 77
LOS: 0 days | Discharge: HOME | DRG: 313 | End: 2017-10-24
Attending: HOSPITALIST | Admitting: HOSPITALIST
Payer: MEDICARE

## 2017-10-23 VITALS
RESPIRATION RATE: 20 BRPM | WEIGHT: 250 LBS | HEART RATE: 70 BPM | DIASTOLIC BLOOD PRESSURE: 84 MMHG | HEIGHT: 65 IN | SYSTOLIC BLOOD PRESSURE: 158 MMHG | OXYGEN SATURATION: 93 % | TEMPERATURE: 98 F

## 2017-10-23 VITALS
RESPIRATION RATE: 20 BRPM | DIASTOLIC BLOOD PRESSURE: 79 MMHG | SYSTOLIC BLOOD PRESSURE: 154 MMHG | HEART RATE: 83 BPM | OXYGEN SATURATION: 90 %

## 2017-10-23 DIAGNOSIS — Z98.89 OTHER SPECIFIED POSTPROCEDURAL STATES: Chronic | ICD-10-CM

## 2017-10-23 DIAGNOSIS — Z90.49 ACQUIRED ABSENCE OF OTHER SPECIFIED PARTS OF DIGESTIVE TRACT: Chronic | ICD-10-CM

## 2017-10-23 DIAGNOSIS — Z96.651 PRESENCE OF RIGHT ARTIFICIAL KNEE JOINT: Chronic | ICD-10-CM

## 2017-10-23 DIAGNOSIS — Z90.710 ACQUIRED ABSENCE OF BOTH CERVIX AND UTERUS: Chronic | ICD-10-CM

## 2017-10-23 LAB
ALBUMIN SERPL ELPH-MCNC: 3.8 G/DL — SIGNIFICANT CHANGE UP (ref 3.3–5.2)
ALP SERPL-CCNC: 93 U/L — SIGNIFICANT CHANGE UP (ref 40–120)
ALT FLD-CCNC: 19 U/L — SIGNIFICANT CHANGE UP
ANION GAP SERPL CALC-SCNC: 17 MMOL/L — SIGNIFICANT CHANGE UP (ref 5–17)
AST SERPL-CCNC: 20 U/L — SIGNIFICANT CHANGE UP
BASOPHILS # BLD AUTO: 0.1 K/UL — SIGNIFICANT CHANGE UP (ref 0–0.2)
BASOPHILS NFR BLD AUTO: 0.6 % — SIGNIFICANT CHANGE UP (ref 0–2)
BILIRUB SERPL-MCNC: 0.4 MG/DL — SIGNIFICANT CHANGE UP (ref 0.4–2)
BUN SERPL-MCNC: 17 MG/DL — SIGNIFICANT CHANGE UP (ref 8–20)
CALCIUM SERPL-MCNC: 9.5 MG/DL — SIGNIFICANT CHANGE UP (ref 8.6–10.2)
CHLORIDE SERPL-SCNC: 100 MMOL/L — SIGNIFICANT CHANGE UP (ref 98–107)
CK MB CFR SERPL CALC: 3.9 NG/ML — SIGNIFICANT CHANGE UP (ref 0–6.7)
CK SERPL-CCNC: 168 U/L — SIGNIFICANT CHANGE UP (ref 25–170)
CO2 SERPL-SCNC: 24 MMOL/L — SIGNIFICANT CHANGE UP (ref 22–29)
CREAT SERPL-MCNC: 0.92 MG/DL — SIGNIFICANT CHANGE UP (ref 0.5–1.3)
EOSINOPHIL # BLD AUTO: 0.4 K/UL — SIGNIFICANT CHANGE UP (ref 0–0.5)
EOSINOPHIL NFR BLD AUTO: 3.4 % — SIGNIFICANT CHANGE UP (ref 0–6)
GLUCOSE SERPL-MCNC: 162 MG/DL — HIGH (ref 70–115)
HCT VFR BLD CALC: 39.3 % — SIGNIFICANT CHANGE UP (ref 37–47)
HGB BLD-MCNC: 13 G/DL — SIGNIFICANT CHANGE UP (ref 12–16)
LIDOCAIN IGE QN: 39 U/L — SIGNIFICANT CHANGE UP (ref 22–51)
LYMPHOCYTES # BLD AUTO: 29.2 % — SIGNIFICANT CHANGE UP (ref 20–55)
LYMPHOCYTES # BLD AUTO: 3.2 K/UL — SIGNIFICANT CHANGE UP (ref 1–4.8)
MCHC RBC-ENTMCNC: 29.8 PG — SIGNIFICANT CHANGE UP (ref 27–31)
MCHC RBC-ENTMCNC: 33.1 G/DL — SIGNIFICANT CHANGE UP (ref 32–36)
MCV RBC AUTO: 90.1 FL — SIGNIFICANT CHANGE UP (ref 81–99)
MONOCYTES # BLD AUTO: 0.8 K/UL — SIGNIFICANT CHANGE UP (ref 0–0.8)
MONOCYTES NFR BLD AUTO: 7 % — SIGNIFICANT CHANGE UP (ref 3–10)
NEUTROPHILS # BLD AUTO: 6.4 K/UL — SIGNIFICANT CHANGE UP (ref 1.8–8)
NEUTROPHILS NFR BLD AUTO: 59.4 % — SIGNIFICANT CHANGE UP (ref 37–73)
NT-PROBNP SERPL-SCNC: 112 PG/ML — SIGNIFICANT CHANGE UP (ref 0–300)
PLATELET # BLD AUTO: 315 K/UL — SIGNIFICANT CHANGE UP (ref 150–400)
POTASSIUM SERPL-MCNC: 3.9 MMOL/L — SIGNIFICANT CHANGE UP (ref 3.5–5.3)
POTASSIUM SERPL-SCNC: 3.9 MMOL/L — SIGNIFICANT CHANGE UP (ref 3.5–5.3)
PROT SERPL-MCNC: 7.4 G/DL — SIGNIFICANT CHANGE UP (ref 6.6–8.7)
RBC # BLD: 4.36 M/UL — LOW (ref 4.4–5.2)
RBC # FLD: 14.5 % — SIGNIFICANT CHANGE UP (ref 11–15.6)
SODIUM SERPL-SCNC: 141 MMOL/L — SIGNIFICANT CHANGE UP (ref 135–145)
TROPONIN T SERPL-MCNC: <0.01 NG/ML — SIGNIFICANT CHANGE UP (ref 0–0.06)
WBC # BLD: 10.8 K/UL — SIGNIFICANT CHANGE UP (ref 4.8–10.8)
WBC # FLD AUTO: 10.8 K/UL — SIGNIFICANT CHANGE UP (ref 4.8–10.8)

## 2017-10-23 PROCEDURE — 93971 EXTREMITY STUDY: CPT | Mod: 26,LT

## 2017-10-23 PROCEDURE — 99285 EMERGENCY DEPT VISIT HI MDM: CPT

## 2017-10-23 PROCEDURE — 93010 ELECTROCARDIOGRAM REPORT: CPT

## 2017-10-23 PROCEDURE — 71020: CPT | Mod: 26

## 2017-10-23 RX ORDER — SODIUM CHLORIDE 9 MG/ML
3 INJECTION INTRAMUSCULAR; INTRAVENOUS; SUBCUTANEOUS ONCE
Qty: 0 | Refills: 0 | Status: COMPLETED | OUTPATIENT
Start: 2017-10-23 | End: 2017-10-23

## 2017-10-23 RX ADMIN — SODIUM CHLORIDE 3 MILLILITER(S): 9 INJECTION INTRAMUSCULAR; INTRAVENOUS; SUBCUTANEOUS at 21:43

## 2017-10-23 NOTE — ED PROVIDER NOTE - OBJECTIVE STATEMENT
76 y/o F PMHx sleep apnea, DM, HTN presents to ED c/o constant CP. CP radiated to L arm with tingling to L side of face onset 1400 today. Associated Sx SOB x few weeks. Pt is on CPAP and oxygen at night for sleep apnea. Had a routine check up at cardiologist two weeks ago, normal. Was last catheterized one year ago. Denies back pain, dizziness, N/V/D, fever, chills, difficulty breathing, HA, and abd pain.  Cardiologist: Dr. Price  PCP: Dr. Leida Yeung 76 y/o F PMHx sleep apnea, DM, HTN presents to ED c/o constant CP. CP radiated to L arm with tingling to L side of face onset 1400 today. Associated Sx SOB x few weeks. Pt is on CPAP and oxygen at night for sleep apnea. Had a routine check up at cardiologist two weeks ago, normal. Was last catheterized one year ago. Denies back pain, dizziness, N/V/D, fever, chills, difficulty breathing, HA, and abd pain.  Cardiologist: Dr. Rodriguez  PCP: Dr. Leida Yeung

## 2017-10-23 NOTE — ED PROVIDER NOTE - PMH
Cataract    Depression    Diabetes    DJD (degenerative joint disease)    Dyslipidemia    Edema of both legs    Hypertension    Sleep apnea    Venous insufficiency of both lower extremities

## 2017-10-23 NOTE — ED PROVIDER NOTE - PROGRESS NOTE DETAILS
Went to evaluate pt, pt was not at designated location, will continue to look for her. Labs are as noted. Patient to be admitted for evaluation of acs. After discussion with hospitalist recent catheterization was reviewed showing no CAD. Given unchanged EKG pt to follow up out patient with Dr. Rodriguez. Pt was declined admission and will be DC'd home.

## 2017-10-23 NOTE — ED ADULT NURSE NOTE - OBJECTIVE STATEMENT
Pt received 2143, RN away with critical PT, and pt at CT. PT AOx3, resp even unlabored. Pt reports an episode of chest pain that radiated to left upper extremity. Pt denies chest pain at this time, Pt denies numbness or tingling, N/V/D. +ROM to all extremities, NSR, VSS. Pt awaiting test results, will continue to monitor

## 2017-10-23 NOTE — ED PROVIDER NOTE - MUSCULOSKELETAL, MLM
Spine is midline. No midline TTP. Swelling to L lower leg which pt states is chronic however, has L calf TTP which she states is intermittent.

## 2017-10-24 DIAGNOSIS — E78.5 HYPERLIPIDEMIA, UNSPECIFIED: ICD-10-CM

## 2017-10-24 DIAGNOSIS — I10 ESSENTIAL (PRIMARY) HYPERTENSION: ICD-10-CM

## 2017-10-24 DIAGNOSIS — F32.9 MAJOR DEPRESSIVE DISORDER, SINGLE EPISODE, UNSPECIFIED: ICD-10-CM

## 2017-10-24 DIAGNOSIS — E03.9 HYPOTHYROIDISM, UNSPECIFIED: ICD-10-CM

## 2017-10-24 DIAGNOSIS — G47.33 OBSTRUCTIVE SLEEP APNEA (ADULT) (PEDIATRIC): ICD-10-CM

## 2017-10-24 DIAGNOSIS — R07.89 OTHER CHEST PAIN: ICD-10-CM

## 2017-10-24 DIAGNOSIS — R07.9 CHEST PAIN, UNSPECIFIED: ICD-10-CM

## 2017-10-24 LAB
CK MB CFR SERPL CALC: 3.5 NG/ML — SIGNIFICANT CHANGE UP (ref 0–6.7)
CK SERPL-CCNC: 157 U/L — SIGNIFICANT CHANGE UP (ref 25–170)
TROPONIN T SERPL-MCNC: <0.01 NG/ML — SIGNIFICANT CHANGE UP (ref 0–0.06)

## 2017-10-24 PROCEDURE — 82550 ASSAY OF CK (CPK): CPT

## 2017-10-24 PROCEDURE — 82553 CREATINE MB FRACTION: CPT

## 2017-10-24 PROCEDURE — 84484 ASSAY OF TROPONIN QUANT: CPT

## 2017-10-24 PROCEDURE — 93971 EXTREMITY STUDY: CPT

## 2017-10-24 PROCEDURE — 83880 ASSAY OF NATRIURETIC PEPTIDE: CPT

## 2017-10-24 PROCEDURE — 80053 COMPREHEN METABOLIC PANEL: CPT

## 2017-10-24 PROCEDURE — 36415 COLL VENOUS BLD VENIPUNCTURE: CPT

## 2017-10-24 PROCEDURE — 85027 COMPLETE CBC AUTOMATED: CPT

## 2017-10-24 PROCEDURE — 93005 ELECTROCARDIOGRAM TRACING: CPT

## 2017-10-24 PROCEDURE — 71046 X-RAY EXAM CHEST 2 VIEWS: CPT

## 2017-10-24 PROCEDURE — 99284 EMERGENCY DEPT VISIT MOD MDM: CPT | Mod: 25

## 2017-10-24 PROCEDURE — 83690 ASSAY OF LIPASE: CPT

## 2017-10-24 NOTE — CONSULT NOTE ADULT - PROBLEM SELECTOR RECOMMENDATION 2
Patient with known hypoxia from COPD and has not been wearing her home oxygen. This is likely the cause of her symptoms. She was advised to cont. her home 02 and would benefit from portable concentrator when out of her house. She will call Dr. White this week for f/u/

## 2017-10-24 NOTE — CONSULT NOTE ADULT - ATTENDING COMMENTS
Patient agreeable with above plan. She has full capacity to make decisions and shows good insight and judgement to her proposed treatment plan

## 2017-10-24 NOTE — CONSULT NOTE ADULT - SUBJECTIVE AND OBJECTIVE BOX
CC: SOB/CP    HPI:Patient is a very pleasant 76 y/o female with history of obesity, COPD on home 02, JAVON on nocturnal CPAP, HTN, HLD, DJD. DM-2, s/p right TKR in 4/15, s/p LHC in 7/16 which showed normal coronaries, Hypothyroidism, Depression who presents with episode of left sided arm pain and chest pressure that occurred yesterday. Patient denies pleurisy, fever, chills, cough, trauma. She states she has SOB/dyspnea at baseline and was told recently by her Pulmonologist to wear her oxygen with activity and while doing activities at home. No hx of PE/DVT. No c/p calf pain or recent immobility. In the ED patient symptoms resolved, her vitals are stable with exception of hypoxia with room air sat of 92% and goes up to 95 percent on 2 liters, exam benign, her EKG was unchanged from prior, her initial Trop was neg, CXR unchanged, labs are WNL with exception of mild glucose elevation.     All: NSAIDS    Medhx: As above    Surghx: As above    ROS: As per HPI, remainder of ROS reported as neg.    Sochx: , lives with , no etoh, no illicit drugs, former smoker, no assistive devices, no reported falls    Famhx: Pos. for HTN    Home meds: Aspirin 81mg daily, Lexapro 20mg daily, Metformin 1000mg BID, Norvasc 5mg, GlipizidXR 2.5 daily, lipitor 20mg daily, Metoprolol 50mg BID, Synthroid 50mcg daily    PE:  VS: 154/79, 18, 70 regular, Afebrile, 93%RA, pain o/10  GEN: Awake, alert lying in bed in NAD  HEENT: NC/AT, EOMI, no icterus, no pallor, MM moist, no oral lesions  Neck: no JVD, supple, no adenopathy   CVS: S1S2 + no M/R/G  Pulm: No wheezes, no rales, diminished BS B/L bases in post lung fields, no pleurisy   ABD: obese, soft, BS + no rebound or guarding  EXTR:  old Right TKR scar, +1 edema B/L, no cyanosis, cap refill <3 sec.   Skin: no rashes, lesions   Neuro: AAOX4, NFD    LABS: Normal except for Glucose of 164  CXR: Intersitial thickening, unchanged from prior  EKG: NSR with LBBB, unchanged from prior in 2/17  LE doppler: No DVT  Cath 7/16: Normal coronaries

## 2017-10-24 NOTE — CONSULT NOTE ADULT - ASSESSMENT
78 y/o female with atypical CP, chronic AWAD from COPD w/ chronic hypoxia, HTN, HLD, obesity, DJD, Hypothyroidism, Depression

## 2017-10-24 NOTE — ED ADULT NURSE REASSESSMENT NOTE - NS ED NURSE REASSESS COMMENT FT1
Report received from offgoing RN
Report received from offgoing RN, chart as noted. MD Mayer @ bedside
pt not admitted to [facility], admitting made aware. pt d/c from hed

## 2017-10-24 NOTE — CONSULT NOTE ADULT - PROBLEM SELECTOR RECOMMENDATION 9
Patient with normal nuclear stress test and left heart catheterization last year, unchanged EKG from prior, neg. troponin. There is no suggestion of ACS at this time. Can obtain 2nd Trop/CK in 4 hours and if negative patient can be dcd home to f/u with PMD/Cardiologist as scheduled.

## 2017-11-02 ENCOUNTER — APPOINTMENT (OUTPATIENT)
Dept: PULMONOLOGY | Facility: CLINIC | Age: 77
End: 2017-11-02
Payer: MEDICARE

## 2017-11-02 VITALS
HEART RATE: 71 BPM | BODY MASS INDEX: 46.26 KG/M2 | DIASTOLIC BLOOD PRESSURE: 80 MMHG | WEIGHT: 278 LBS | SYSTOLIC BLOOD PRESSURE: 140 MMHG

## 2017-11-02 VITALS — OXYGEN SATURATION: 94 %

## 2017-11-02 PROCEDURE — 99215 OFFICE O/P EST HI 40 MIN: CPT

## 2017-12-11 ENCOUNTER — APPOINTMENT (OUTPATIENT)
Dept: FAMILY MEDICINE | Facility: CLINIC | Age: 77
End: 2017-12-11
Payer: MEDICARE

## 2017-12-11 VITALS
HEIGHT: 65 IN | WEIGHT: 277 LBS | BODY MASS INDEX: 46.15 KG/M2 | HEART RATE: 76 BPM | SYSTOLIC BLOOD PRESSURE: 124 MMHG | DIASTOLIC BLOOD PRESSURE: 70 MMHG

## 2017-12-11 PROCEDURE — 99214 OFFICE O/P EST MOD 30 MIN: CPT

## 2017-12-14 ENCOUNTER — APPOINTMENT (OUTPATIENT)
Dept: PULMONOLOGY | Facility: CLINIC | Age: 77
End: 2017-12-14

## 2017-12-15 ENCOUNTER — APPOINTMENT (OUTPATIENT)
Dept: ORTHOPEDIC SURGERY | Facility: CLINIC | Age: 77
End: 2017-12-15
Payer: MEDICARE

## 2017-12-15 VITALS
WEIGHT: 277 LBS | DIASTOLIC BLOOD PRESSURE: 68 MMHG | HEART RATE: 74 BPM | HEIGHT: 65 IN | SYSTOLIC BLOOD PRESSURE: 169 MMHG | BODY MASS INDEX: 46.15 KG/M2

## 2017-12-15 PROCEDURE — 20610 DRAIN/INJ JOINT/BURSA W/O US: CPT | Mod: 50

## 2017-12-15 PROCEDURE — 99214 OFFICE O/P EST MOD 30 MIN: CPT | Mod: 25

## 2018-01-11 ENCOUNTER — APPOINTMENT (OUTPATIENT)
Dept: PULMONOLOGY | Facility: CLINIC | Age: 78
End: 2018-01-11
Payer: MEDICARE

## 2018-01-11 VITALS — OXYGEN SATURATION: 95 % | HEART RATE: 60 BPM

## 2018-01-11 VITALS
SYSTOLIC BLOOD PRESSURE: 130 MMHG | DIASTOLIC BLOOD PRESSURE: 70 MMHG | HEART RATE: 69 BPM | BODY MASS INDEX: 44.93 KG/M2 | WEIGHT: 270 LBS

## 2018-01-11 PROCEDURE — 99214 OFFICE O/P EST MOD 30 MIN: CPT

## 2018-02-07 ENCOUNTER — APPOINTMENT (OUTPATIENT)
Dept: PULMONOLOGY | Facility: CLINIC | Age: 78
End: 2018-02-07
Payer: MEDICARE

## 2018-02-07 VITALS — HEART RATE: 63 BPM | OXYGEN SATURATION: 97 %

## 2018-02-07 VITALS — BODY MASS INDEX: 45.43 KG/M2 | DIASTOLIC BLOOD PRESSURE: 80 MMHG | SYSTOLIC BLOOD PRESSURE: 128 MMHG | WEIGHT: 273 LBS

## 2018-02-07 PROCEDURE — 99214 OFFICE O/P EST MOD 30 MIN: CPT

## 2018-03-12 ENCOUNTER — APPOINTMENT (OUTPATIENT)
Dept: FAMILY MEDICINE | Facility: CLINIC | Age: 78
End: 2018-03-12
Payer: MEDICARE

## 2018-03-12 VITALS
HEIGHT: 65 IN | DIASTOLIC BLOOD PRESSURE: 78 MMHG | BODY MASS INDEX: 45.82 KG/M2 | WEIGHT: 275 LBS | HEART RATE: 80 BPM | SYSTOLIC BLOOD PRESSURE: 130 MMHG

## 2018-03-12 DIAGNOSIS — Z23 ENCOUNTER FOR IMMUNIZATION: ICD-10-CM

## 2018-03-12 PROCEDURE — 99214 OFFICE O/P EST MOD 30 MIN: CPT

## 2018-03-14 PROBLEM — Z23 INFLUENZA VACCINATION GIVEN: Status: RESOLVED | Noted: 2017-09-18 | Resolved: 2018-03-14

## 2018-03-20 ENCOUNTER — APPOINTMENT (OUTPATIENT)
Dept: FAMILY MEDICINE | Facility: CLINIC | Age: 78
End: 2018-03-20
Payer: MEDICARE

## 2018-03-20 VITALS
SYSTOLIC BLOOD PRESSURE: 132 MMHG | HEART RATE: 75 BPM | OXYGEN SATURATION: 94 % | TEMPERATURE: 98.1 F | BODY MASS INDEX: 29.89 KG/M2 | HEIGHT: 65 IN | WEIGHT: 179.38 LBS | DIASTOLIC BLOOD PRESSURE: 74 MMHG

## 2018-03-20 PROCEDURE — 99214 OFFICE O/P EST MOD 30 MIN: CPT

## 2018-03-23 ENCOUNTER — APPOINTMENT (OUTPATIENT)
Dept: ORTHOPEDIC SURGERY | Facility: CLINIC | Age: 78
End: 2018-03-23
Payer: MEDICARE

## 2018-03-23 ENCOUNTER — APPOINTMENT (OUTPATIENT)
Dept: CARDIOLOGY | Facility: CLINIC | Age: 78
End: 2018-03-23
Payer: MEDICARE

## 2018-03-23 ENCOUNTER — NON-APPOINTMENT (OUTPATIENT)
Age: 78
End: 2018-03-23

## 2018-03-23 VITALS
HEART RATE: 76 BPM | WEIGHT: 279 LBS | DIASTOLIC BLOOD PRESSURE: 74 MMHG | SYSTOLIC BLOOD PRESSURE: 154 MMHG | BODY MASS INDEX: 46.43 KG/M2 | OXYGEN SATURATION: 93 %

## 2018-03-23 VITALS — DIASTOLIC BLOOD PRESSURE: 72 MMHG | SYSTOLIC BLOOD PRESSURE: 150 MMHG

## 2018-03-23 VITALS
SYSTOLIC BLOOD PRESSURE: 158 MMHG | HEART RATE: 74 BPM | DIASTOLIC BLOOD PRESSURE: 74 MMHG | WEIGHT: 179 LBS | HEIGHT: 65 IN | BODY MASS INDEX: 29.82 KG/M2

## 2018-03-23 PROCEDURE — 93000 ELECTROCARDIOGRAM COMPLETE: CPT

## 2018-03-23 PROCEDURE — 73562 X-RAY EXAM OF KNEE 3: CPT | Mod: 50

## 2018-03-23 PROCEDURE — 20610 DRAIN/INJ JOINT/BURSA W/O US: CPT | Mod: 50

## 2018-03-23 PROCEDURE — 99214 OFFICE O/P EST MOD 30 MIN: CPT | Mod: 25

## 2018-03-23 PROCEDURE — 99214 OFFICE O/P EST MOD 30 MIN: CPT

## 2018-03-23 RX ORDER — AZITHROMYCIN 250 MG/1
250 TABLET, FILM COATED ORAL
Qty: 6 | Refills: 0 | Status: DISCONTINUED | COMMUNITY
Start: 2018-03-20 | End: 2018-03-23

## 2018-04-06 ENCOUNTER — APPOINTMENT (OUTPATIENT)
Dept: CARDIOLOGY | Facility: CLINIC | Age: 78
End: 2018-04-06
Payer: MEDICARE

## 2018-04-06 PROCEDURE — 93306 TTE W/DOPPLER COMPLETE: CPT

## 2018-04-11 ENCOUNTER — APPOINTMENT (OUTPATIENT)
Dept: PULMONOLOGY | Facility: CLINIC | Age: 78
End: 2018-04-11
Payer: MEDICARE

## 2018-04-11 VITALS
HEART RATE: 69 BPM | WEIGHT: 269 LBS | BODY MASS INDEX: 44.76 KG/M2 | OXYGEN SATURATION: 90 % | SYSTOLIC BLOOD PRESSURE: 134 MMHG | DIASTOLIC BLOOD PRESSURE: 68 MMHG

## 2018-04-11 PROCEDURE — 99214 OFFICE O/P EST MOD 30 MIN: CPT

## 2018-06-05 ENCOUNTER — APPOINTMENT (OUTPATIENT)
Dept: FAMILY MEDICINE | Facility: CLINIC | Age: 78
End: 2018-06-05
Payer: MEDICARE

## 2018-06-05 VITALS
HEART RATE: 72 BPM | SYSTOLIC BLOOD PRESSURE: 135 MMHG | WEIGHT: 276 LBS | BODY MASS INDEX: 45.98 KG/M2 | DIASTOLIC BLOOD PRESSURE: 70 MMHG | HEIGHT: 65 IN

## 2018-06-05 DIAGNOSIS — J06.9 ACUTE UPPER RESPIRATORY INFECTION, UNSPECIFIED: ICD-10-CM

## 2018-06-05 PROCEDURE — 99214 OFFICE O/P EST MOD 30 MIN: CPT | Mod: 25

## 2018-06-05 PROCEDURE — 36415 COLL VENOUS BLD VENIPUNCTURE: CPT

## 2018-06-05 RX ORDER — DULAGLUTIDE 0.75 MG/.5ML
0.75 INJECTION, SOLUTION SUBCUTANEOUS
Refills: 0 | Status: DISCONTINUED | COMMUNITY
End: 2018-06-05

## 2018-06-05 NOTE — PLAN
[FreeTextEntry1] : HEALTHY DIET AND LIFESTYLE MODIFICATIONS\par HEALTHY WEIGHT LOSS \par MONITOR LAB WORK INCLUDING TFT'S\par CHECK VENOUS DUPLEX\par RX FOR NEXT THYROID SONOGRAM - TO ALSO DISCUSS WITH ENDOCRINOLOGY\par FOLLOW-UP ALL SPECIALISTS AS DIRECTED \par CALL WITH ANY QUESTIONS, CONCERNS OR CHANGES

## 2018-06-05 NOTE — REVIEW OF SYSTEMS
[Fatigue] : fatigue [Lower Ext Edema] : lower extremity edema [Fever] : no fever [Chills] : no chills [Discharge] : no discharge [Pain] : no pain [Chest Pain] : no chest pain [Palpitations] : no palpitations [Shortness Of Breath] : no shortness of breath [Cough] : no cough [Abdominal Pain] : no abdominal pain [Nausea] : no nausea [Diarrhea] : diarrhea [Vomiting] : no vomiting [Melena] : no melena [Dysuria] : no dysuria [Hematuria] : no hematuria [Headache] : no headache [Dizziness] : no dizziness [Fainting] : no fainting

## 2018-06-05 NOTE — COUNSELING
[Weight management counseling provided] : Weight management [Healthy eating counseling provided] : healthy eating [Activity counseling provided] : activity [Disease Management counseling provided] : disease management  [Walking] : Walking [Low Fat Diet] : Low fat diet [Low Salt Diet] : Low salt diet [Decrease Portions] : Decrease food portions [Take medications as directed] : Take medications as directed [None] : None [Good understanding] : Patient has a good understanding of lifestyle changes and the steps needed to achieve self management goals

## 2018-06-05 NOTE — PHYSICAL EXAM
[No Acute Distress] : no acute distress [Well Nourished] : well nourished [Well-Appearing] : well-appearing [Normal Sclera/Conjunctiva] : normal sclera/conjunctiva [PERRL] : pupils equal round and reactive to light [EOMI] : extraocular movements intact [Supple] : supple [No Lymphadenopathy] : no lymphadenopathy [No Respiratory Distress] : no respiratory distress  [Clear to Auscultation] : lungs were clear to auscultation bilaterally [No Accessory Muscle Use] : no accessory muscle use [Normal Rate] : normal rate  [Regular Rhythm] : with a regular rhythm [Normal S1, S2] : normal S1 and S2 [Grossly Normal Strength/Tone] : grossly normal strength/tone [Speech Grossly Normal] : speech grossly normal [Memory Grossly Normal] : memory grossly normal [Normal Insight/Judgement] : insight and judgment were intact [de-identified] : LEFT > RIGHT NON-PITTING EDEMA

## 2018-06-05 NOTE — HISTORY OF PRESENT ILLNESS
[Hyperlipidemia] : Hyperlipidemia [Hypertension] : Hypertension [Obesity] : Obesity [Patient was last seen on ___] : Patient was last seen on [unfilled] [FreeTextEntry6] : PRESENTING FOR FOLLOW-UP.  CHRONIC HISTORY OF HYPOTHYROIDISM, DEPRESSION, HYPERTENSION, HYPERLIPIDEMIA, JAVON AND DIABETES UNDER THE CARE OF ENDOCRINOLOGY.  HAD CORTISONE INJECTIONS BILATERAL KNEES WHICH HAS HELPED.  SAW CARDIOLOGY - HAD ECHOCARDIOGRAM.  SAW PULMONOLOGY IN FOLLOW-UP OF SLEEP APNEA.  HAS HAD SWELLING LEFT > RIGHT LOWER EXTREMITY CHRONICALLY WITH MULTIPLE PRIOR VENOUS DUPLEX.  LAST OVER A YEAR AGO.  ON LEVOTHYROXINE FOR HYPOTHYROIDISM.  PRIOR NODULE - DUE FOR FOLLOW-UP STUDY.  WAS PREVIOUSLY DONE WITH ENDOCRINOLOGY.  BLOOD PRESSURE CONTROLLED.  ON LIPITOR FOR CHOLESTEROL.  FEELING WELL ON LEXAPRO WITHOUT SIDE EFFECTS.  GETTING ALONG WELL WITH OTHERS.   HAS HAD NO HEADACHE, DIZZINESS, CHEST PAIN, SHORTNESS OF BREATH, GI OR URINARY COMPLAINTS.  NO OTHER COMPLAINTS TODAY.

## 2018-06-06 LAB
CREAT SPEC-SCNC: 61
HBA1C MFR BLD HPLC: 6.8
LDLC SERPL CALC-MCNC: 66
MICROALBUMIN 24H UR DL<=1MG/L-MCNC: 2.6
MICROALBUMIN/CREAT 24H UR-RTO: 43

## 2018-06-14 LAB
T4 FREE SERPL-MCNC: 1.5 NG/DL
TSH SERPL-ACNC: 2.16 UIU/ML

## 2018-07-16 ENCOUNTER — APPOINTMENT (OUTPATIENT)
Dept: ORTHOPEDIC SURGERY | Facility: CLINIC | Age: 78
End: 2018-07-16
Payer: MEDICARE

## 2018-07-16 VITALS
BODY MASS INDEX: 45.98 KG/M2 | DIASTOLIC BLOOD PRESSURE: 67 MMHG | WEIGHT: 276 LBS | HEIGHT: 65 IN | SYSTOLIC BLOOD PRESSURE: 136 MMHG | HEART RATE: 69 BPM

## 2018-07-16 PROCEDURE — 99213 OFFICE O/P EST LOW 20 MIN: CPT | Mod: 25

## 2018-07-16 PROCEDURE — 20610 DRAIN/INJ JOINT/BURSA W/O US: CPT | Mod: LT

## 2018-07-31 ENCOUNTER — FORM ENCOUNTER (OUTPATIENT)
Age: 78
End: 2018-07-31

## 2018-08-01 ENCOUNTER — APPOINTMENT (OUTPATIENT)
Dept: ULTRASOUND IMAGING | Facility: CLINIC | Age: 78
End: 2018-08-01

## 2018-08-01 ENCOUNTER — OUTPATIENT (OUTPATIENT)
Dept: OUTPATIENT SERVICES | Facility: HOSPITAL | Age: 78
LOS: 1 days | End: 2018-08-01
Payer: MEDICARE

## 2018-08-01 DIAGNOSIS — Z96.651 PRESENCE OF RIGHT ARTIFICIAL KNEE JOINT: Chronic | ICD-10-CM

## 2018-08-01 DIAGNOSIS — E04.1 NONTOXIC SINGLE THYROID NODULE: ICD-10-CM

## 2018-08-01 DIAGNOSIS — Z98.89 OTHER SPECIFIED POSTPROCEDURAL STATES: Chronic | ICD-10-CM

## 2018-08-01 DIAGNOSIS — Z90.710 ACQUIRED ABSENCE OF BOTH CERVIX AND UTERUS: Chronic | ICD-10-CM

## 2018-08-01 DIAGNOSIS — Z90.49 ACQUIRED ABSENCE OF OTHER SPECIFIED PARTS OF DIGESTIVE TRACT: Chronic | ICD-10-CM

## 2018-08-01 PROCEDURE — 93970 EXTREMITY STUDY: CPT | Mod: 26

## 2018-08-01 PROCEDURE — 93970 EXTREMITY STUDY: CPT

## 2018-08-01 PROCEDURE — 76536 US EXAM OF HEAD AND NECK: CPT | Mod: 26

## 2018-08-01 PROCEDURE — 76536 US EXAM OF HEAD AND NECK: CPT

## 2018-08-06 ENCOUNTER — FORM ENCOUNTER (OUTPATIENT)
Age: 78
End: 2018-08-06

## 2018-08-06 ENCOUNTER — APPOINTMENT (OUTPATIENT)
Dept: PULMONOLOGY | Facility: CLINIC | Age: 78
End: 2018-08-06
Payer: MEDICARE

## 2018-08-06 VITALS — OXYGEN SATURATION: 94 %

## 2018-08-06 VITALS
WEIGHT: 272 LBS | BODY MASS INDEX: 45.32 KG/M2 | SYSTOLIC BLOOD PRESSURE: 142 MMHG | HEART RATE: 76 BPM | DIASTOLIC BLOOD PRESSURE: 78 MMHG | OXYGEN SATURATION: 91 % | HEIGHT: 65 IN

## 2018-08-06 PROCEDURE — 99214 OFFICE O/P EST MOD 30 MIN: CPT

## 2018-08-07 ENCOUNTER — APPOINTMENT (OUTPATIENT)
Dept: CT IMAGING | Facility: CLINIC | Age: 78
End: 2018-08-07
Payer: MEDICARE

## 2018-08-07 ENCOUNTER — OUTPATIENT (OUTPATIENT)
Dept: OUTPATIENT SERVICES | Facility: HOSPITAL | Age: 78
LOS: 1 days | End: 2018-08-07
Payer: MEDICARE

## 2018-08-07 DIAGNOSIS — Z98.89 OTHER SPECIFIED POSTPROCEDURAL STATES: Chronic | ICD-10-CM

## 2018-08-07 DIAGNOSIS — Z90.49 ACQUIRED ABSENCE OF OTHER SPECIFIED PARTS OF DIGESTIVE TRACT: Chronic | ICD-10-CM

## 2018-08-07 DIAGNOSIS — Z90.710 ACQUIRED ABSENCE OF BOTH CERVIX AND UTERUS: Chronic | ICD-10-CM

## 2018-08-07 DIAGNOSIS — Z96.651 PRESENCE OF RIGHT ARTIFICIAL KNEE JOINT: Chronic | ICD-10-CM

## 2018-08-07 DIAGNOSIS — R09.02 HYPOXEMIA: ICD-10-CM

## 2018-08-07 PROCEDURE — 82565 ASSAY OF CREATININE: CPT

## 2018-08-07 PROCEDURE — 71275 CT ANGIOGRAPHY CHEST: CPT | Mod: 26

## 2018-08-07 PROCEDURE — 71275 CT ANGIOGRAPHY CHEST: CPT

## 2018-08-14 ENCOUNTER — OTHER (OUTPATIENT)
Age: 78
End: 2018-08-14

## 2018-09-06 ENCOUNTER — APPOINTMENT (OUTPATIENT)
Dept: FAMILY MEDICINE | Facility: CLINIC | Age: 78
End: 2018-09-06
Payer: MEDICARE

## 2018-09-06 VITALS
HEART RATE: 70 BPM | DIASTOLIC BLOOD PRESSURE: 68 MMHG | OXYGEN SATURATION: 93 % | BODY MASS INDEX: 45.15 KG/M2 | SYSTOLIC BLOOD PRESSURE: 124 MMHG | WEIGHT: 271 LBS | HEIGHT: 65 IN

## 2018-09-06 PROCEDURE — 99214 OFFICE O/P EST MOD 30 MIN: CPT

## 2018-09-07 NOTE — PLAN
[FreeTextEntry1] : RECENT THYROID SONOGRAM REVIEWED\par RX FOR FNA BIOPSY\par TO FOLLOW-UP WITH ENDOCRINOLOGIST\par HEALTHY DIET AND LIFESTYLE MODIFICATIONS\par HEALTHY WEIGHT LOSS \par BLOOD PRESSURE CONTROLLED\par RECENT LAB WORK REVIEWED FROM ENDOCRINOLOGIST\par FOLLOW-UP ALL SPECIALISTS AS DIRECTED \par FLU VACCINE IN THE FALL RECOMMENDED\par CALL WITH ANY QUESTIONS, CONCERNS OR CHANGES

## 2018-09-07 NOTE — COUNSELING
[Weight management counseling provided] : Weight management [Healthy eating counseling provided] : healthy eating [Activity counseling provided] : activity [Behavioral health counseling provided] : behavioral health  [Low Fat Diet] : Low fat diet [Decrease Portions] : Decrease food portions [Low Salt Diet] : Low salt diet [Walking] : Walking [Engage in a relaxing activity] : Engage in a relaxing activity [None] : None [Good understanding] : Patient has a good understanding of lifestyle changes and the steps needed to achieve self management goals

## 2018-09-07 NOTE — REVIEW OF SYSTEMS
[Fever] : no fever [Chills] : no chills [Fatigue] : no fatigue [Discharge] : no discharge [Pain] : no pain [Chest Pain] : no chest pain [Palpitations] : no palpitations [Shortness Of Breath] : no shortness of breath [Cough] : no cough [Abdominal Pain] : no abdominal pain [Nausea] : no nausea [Diarrhea] : diarrhea [Vomiting] : no vomiting [Melena] : no melena [Dysuria] : no dysuria [Hematuria] : no hematuria [Headache] : no headache [Dizziness] : no dizziness [Fainting] : no fainting [Suicidal] : not suicidal [Anxiety] : no anxiety

## 2018-09-07 NOTE — HISTORY OF PRESENT ILLNESS
[FreeTextEntry1] : F/U HTN, CHOLESTEROL [de-identified] : PRESENTING FOR FOLLOW-UP. CHRONIC HISTORY OF HYPOTHYROIDISM, DEPRESSION, HYPERTENSION, HYPERLIPIDEMIA, JAVON AND DIABETES UNDER THE CARE OF ENDOCRINOLOGY.  BLOOD PRESSURE WELL CONTROLLED ON CURRENT MEDICATIONS.  TAKING LIPITOR FOR CHOLESTEROL.  DIET WITH SOME ROOM FOR IMPROVEMENT.  NOT ROUTINELY EXERCISING.  DENIES SIGNS OR SYMPTOMS OF THYROID DISEASE ON LEVOTHYROXINE.  HAD THYROID SONOGRAM FOR THYROID NODULE.  GOOD SUPPORT SYSTEM AND TAKING LEXAPRO AS DIRECTED FOR DEPRESSION.  HAS U[place]OMING TRIP WITH HER  TO [place].  NO SUICIDAL/HOMICIDAL IDEATIONS OR PLANS.  FOLLOWS UP WITH MULTIPLE SPECIALISTS ROUTINELY.

## 2018-09-07 NOTE — PHYSICAL EXAM
[No Acute Distress] : no acute distress [Well Nourished] : well nourished [Well-Appearing] : well-appearing [Supple] : supple [No Lymphadenopathy] : no lymphadenopathy [No Respiratory Distress] : no respiratory distress  [Clear to Auscultation] : lungs were clear to auscultation bilaterally [No Accessory Muscle Use] : no accessory muscle use [Normal Rate] : normal rate  [Regular Rhythm] : with a regular rhythm [Normal S1, S2] : normal S1 and S2 [Soft] : abdomen soft [Non Tender] : non-tender [Normal Bowel Sounds] : normal bowel sounds [No Joint Swelling] : no joint swelling [Grossly Normal Strength/Tone] : grossly normal strength/tone [Speech Grossly Normal] : speech grossly normal [Normal Mood] : the mood was normal [Normal Insight/Judgement] : insight and judgment were intact

## 2018-09-10 ENCOUNTER — APPOINTMENT (OUTPATIENT)
Dept: ORTHOPEDIC SURGERY | Facility: CLINIC | Age: 78
End: 2018-09-10
Payer: MEDICARE

## 2018-09-10 VITALS
DIASTOLIC BLOOD PRESSURE: 74 MMHG | HEIGHT: 65 IN | SYSTOLIC BLOOD PRESSURE: 147 MMHG | WEIGHT: 270 LBS | TEMPERATURE: 98.8 F | HEART RATE: 63 BPM | BODY MASS INDEX: 44.98 KG/M2

## 2018-09-10 PROCEDURE — 99214 OFFICE O/P EST MOD 30 MIN: CPT | Mod: 25

## 2018-09-10 PROCEDURE — 20610 DRAIN/INJ JOINT/BURSA W/O US: CPT | Mod: 50

## 2018-10-05 ENCOUNTER — APPOINTMENT (OUTPATIENT)
Dept: CARDIOLOGY | Facility: CLINIC | Age: 78
End: 2018-10-05
Payer: MEDICARE

## 2018-10-05 ENCOUNTER — NON-APPOINTMENT (OUTPATIENT)
Age: 78
End: 2018-10-05

## 2018-10-05 VITALS
HEART RATE: 58 BPM | DIASTOLIC BLOOD PRESSURE: 68 MMHG | WEIGHT: 269 LBS | SYSTOLIC BLOOD PRESSURE: 132 MMHG | BODY MASS INDEX: 44.76 KG/M2 | OXYGEN SATURATION: 93 %

## 2018-10-05 DIAGNOSIS — I34.0 NONRHEUMATIC MITRAL (VALVE) INSUFFICIENCY: ICD-10-CM

## 2018-10-05 PROCEDURE — 99214 OFFICE O/P EST MOD 30 MIN: CPT

## 2018-10-05 PROCEDURE — 93000 ELECTROCARDIOGRAM COMPLETE: CPT

## 2018-10-05 RX ORDER — ALBUTEROL SULFATE 108 UG/1
108 (90 BASE) AEROSOL, METERED RESPIRATORY (INHALATION)
Qty: 1 | Refills: 0 | Status: DISCONTINUED | COMMUNITY
Start: 2018-03-20 | End: 2018-10-05

## 2018-10-16 ENCOUNTER — APPOINTMENT (OUTPATIENT)
Dept: PULMONOLOGY | Facility: CLINIC | Age: 78
End: 2018-10-16
Payer: MEDICARE

## 2018-10-16 VITALS — HEART RATE: 59 BPM | OXYGEN SATURATION: 92 %

## 2018-10-16 VITALS — DIASTOLIC BLOOD PRESSURE: 68 MMHG | SYSTOLIC BLOOD PRESSURE: 122 MMHG

## 2018-10-16 VITALS — BODY MASS INDEX: 44.76 KG/M2 | WEIGHT: 269 LBS

## 2018-10-16 DIAGNOSIS — Z87.898 PERSONAL HISTORY OF OTHER SPECIFIED CONDITIONS: ICD-10-CM

## 2018-10-16 PROCEDURE — 99214 OFFICE O/P EST MOD 30 MIN: CPT

## 2018-12-06 ENCOUNTER — APPOINTMENT (OUTPATIENT)
Dept: FAMILY MEDICINE | Facility: CLINIC | Age: 78
End: 2018-12-06
Payer: COMMERCIAL

## 2018-12-06 VITALS — SYSTOLIC BLOOD PRESSURE: 124 MMHG | DIASTOLIC BLOOD PRESSURE: 84 MMHG

## 2018-12-06 VITALS
BODY MASS INDEX: 44.65 KG/M2 | HEART RATE: 75 BPM | HEIGHT: 65 IN | OXYGEN SATURATION: 90 % | DIASTOLIC BLOOD PRESSURE: 72 MMHG | WEIGHT: 268 LBS | SYSTOLIC BLOOD PRESSURE: 140 MMHG

## 2018-12-06 PROCEDURE — 36415 COLL VENOUS BLD VENIPUNCTURE: CPT

## 2018-12-06 PROCEDURE — 99214 OFFICE O/P EST MOD 30 MIN: CPT | Mod: 25

## 2018-12-06 NOTE — PHYSICAL EXAM
[No Acute Distress] : no acute distress [Well Nourished] : well nourished [Well-Appearing] : well-appearing [Normal Sclera/Conjunctiva] : normal sclera/conjunctiva [PERRL] : pupils equal round and reactive to light [EOMI] : extraocular movements intact [Supple] : supple [No Lymphadenopathy] : no lymphadenopathy [No Respiratory Distress] : no respiratory distress  [Clear to Auscultation] : lungs were clear to auscultation bilaterally [No Accessory Muscle Use] : no accessory muscle use [Normal Rate] : normal rate  [Regular Rhythm] : with a regular rhythm [Normal S1, S2] : normal S1 and S2 [Soft] : abdomen soft [Non Tender] : non-tender [Normal Bowel Sounds] : normal bowel sounds [Speech Grossly Normal] : speech grossly normal [Normal Mood] : the mood was normal [Normal Insight/Judgement] : insight and judgment were intact [Right Foot Was Examined] : Right foot ~C was examined [Left Foot Was Examined] : left foot ~C was examined [None] : no ulcers in either foot were found [] : both feet

## 2018-12-06 NOTE — COUNSELING
[Weight management counseling provided] : Weight management [Healthy eating counseling provided] : healthy eating [Activity counseling provided] : activity [Behavioral health counseling provided] : behavioral health  [Low Fat Diet] : Low fat diet [Decrease Portions] : Decrease food portions [Low Salt Diet] : Low salt diet [Walking] : Walking [Engage in a relaxing activity] : Engage in a relaxing activity [Patient motivation] : Patient motivation [Good understanding] : Patient has a good understanding of lifestyle changes and the steps needed to achieve self management goals

## 2018-12-06 NOTE — HISTORY OF PRESENT ILLNESS
[FreeTextEntry1] : f/u htn [de-identified] : PRESENTING FOR FOLLOW-UP. CHRONIC HISTORY OF HYPOTHYROIDISM, DEPRESSION, HYPERTENSION, HYPERLIPIDEMIA, JAVON AND DIABETES UNDER THE CARE OF ENDOCRINOLOGY.  THYROID NODULE.  STILL NEEDS TO GO FOR THYROID BIOPSY.  SEEING A NEW ENDOCRINOLOGIST IN TWO MONTHS.  APPOINTMENT WITH ENT NEXT WEEK.  BLOOD PRESSURE USUALLY WELL CONTROLLED ON CURRENT MEDICATIONS.  ON LIPITOR FOR CHOLESTEROL.  ON LEXAPRO FOR DEPRESSION.  TAKING DAILY AS DIRECTED.  GETTING ALONG WITH OTHERS.  SLEEPING COULD BE IMPROVED.  APPETITE GOOD.  NO SUICIDAL/HOMICIDAL IDEATIONS OR PLANS.  SAW CARDIOLOGY IN OCTOBER.  NO CHANGES WERE MADE.\par \par ENDOCRINOLOGY: DR. ROBLEDO

## 2018-12-06 NOTE — REVIEW OF SYSTEMS
[Fever] : no fever [Chills] : no chills [Fatigue] : no fatigue [Discharge] : no discharge [Pain] : no pain [Chest Pain] : no chest pain [Palpitations] : no palpitations [Shortness Of Breath] : no shortness of breath [Cough] : no cough [Abdominal Pain] : no abdominal pain [Diarrhea] : diarrhea [Vomiting] : no vomiting [Melena] : no melena [Dysuria] : no dysuria [Hematuria] : no hematuria [Headache] : no headache [Dizziness] : no dizziness [Fainting] : no fainting [Suicidal] : not suicidal

## 2018-12-06 NOTE — PLAN
[FreeTextEntry1] : WILL NO LONGER SEE GYN OR GO FOR COLONOSCOPY\par HEALTHY DIET AND LIFESTYLE MODIFICATIONS\par HEALTHY WEIGHT LOSS \par DOING WELL ON LEXAPRO - WILL CONTINUE\par MONITOR BLOOD PRESSURE\par CHECK ROUTINE LAB WORK\par RE-ADVISED FNA THYROID - AWARE TO EVALUATE FOR POTENTIAL CANCER\par FOLLOW-UP ALL SPECIALISTS AS DIRECTED \par CALL WITH ANY QUESTIONS, CONCERNS OR CHANGES

## 2018-12-14 LAB
ALBUMIN SERPL ELPH-MCNC: 4.3 G/DL
ALP BLD-CCNC: 98 U/L
ALT SERPL-CCNC: 19 U/L
ANION GAP SERPL CALC-SCNC: 14 MMOL/L
AST SERPL-CCNC: 18 U/L
BILIRUB SERPL-MCNC: 0.5 MG/DL
BUN SERPL-MCNC: 18 MG/DL
CALCIUM SERPL-MCNC: 10 MG/DL
CHLORIDE SERPL-SCNC: 100 MMOL/L
CO2 SERPL-SCNC: 26 MMOL/L
CREAT SERPL-MCNC: 1.01 MG/DL
GLUCOSE SERPL-MCNC: 150 MG/DL
HBA1C MFR BLD HPLC: 6.7 %
POTASSIUM SERPL-SCNC: 4.8 MMOL/L
PROT SERPL-MCNC: 7.4 G/DL
SODIUM SERPL-SCNC: 141 MMOL/L
T4 FREE SERPL-MCNC: 1.8 NG/DL
TSH SERPL-ACNC: 2.47 UIU/ML

## 2018-12-19 ENCOUNTER — APPOINTMENT (OUTPATIENT)
Dept: ORTHOPEDIC SURGERY | Facility: CLINIC | Age: 78
End: 2018-12-19
Payer: COMMERCIAL

## 2018-12-19 VITALS
WEIGHT: 268 LBS | SYSTOLIC BLOOD PRESSURE: 163 MMHG | HEIGHT: 65 IN | DIASTOLIC BLOOD PRESSURE: 69 MMHG | BODY MASS INDEX: 44.65 KG/M2 | HEART RATE: 61 BPM

## 2018-12-19 PROCEDURE — 99214 OFFICE O/P EST MOD 30 MIN: CPT | Mod: 25

## 2018-12-19 PROCEDURE — 20610 DRAIN/INJ JOINT/BURSA W/O US: CPT | Mod: LT

## 2019-01-02 ENCOUNTER — TRANSCRIPTION ENCOUNTER (OUTPATIENT)
Age: 79
End: 2019-01-02

## 2019-01-15 ENCOUNTER — RECORD ABSTRACTING (OUTPATIENT)
Age: 79
End: 2019-01-15

## 2019-03-08 ENCOUNTER — APPOINTMENT (OUTPATIENT)
Dept: DERMATOLOGY | Facility: CLINIC | Age: 79
End: 2019-03-08

## 2019-03-08 ENCOUNTER — RECORD ABSTRACTING (OUTPATIENT)
Age: 79
End: 2019-03-08

## 2019-03-14 ENCOUNTER — APPOINTMENT (OUTPATIENT)
Dept: FAMILY MEDICINE | Facility: CLINIC | Age: 79
End: 2019-03-14

## 2019-04-02 ENCOUNTER — APPOINTMENT (OUTPATIENT)
Dept: ORTHOPEDIC SURGERY | Facility: CLINIC | Age: 79
End: 2019-04-02
Payer: MEDICARE

## 2019-04-02 ENCOUNTER — MEDICATION RENEWAL (OUTPATIENT)
Age: 79
End: 2019-04-02

## 2019-04-02 VITALS
BODY MASS INDEX: 44.65 KG/M2 | WEIGHT: 268 LBS | SYSTOLIC BLOOD PRESSURE: 156 MMHG | HEIGHT: 65 IN | HEART RATE: 68 BPM | DIASTOLIC BLOOD PRESSURE: 65 MMHG

## 2019-04-02 PROCEDURE — 99214 OFFICE O/P EST MOD 30 MIN: CPT | Mod: 25

## 2019-04-02 PROCEDURE — 20610 DRAIN/INJ JOINT/BURSA W/O US: CPT | Mod: LT

## 2019-04-02 NOTE — PROCEDURE
[de-identified] : I injected the patient's left knee with cortisone today.\par \par I discussed at length with the patient the planned steroid and lidocaine injection. The risks, benefits, convalescence and alternatives were reviewed. The possible side effects discussed included but were not limited to: pain, swelling, heat, bleeding, and redness. Symptoms are generally mild but if they are extensive then contact the office. Giving pain relievers by mouth such as NSAIDs or Tylenol can generally treat the reactions to steroid and lidocaine. Rare cases of infection have been noted. Rash, hives and itching may occur post injection. If you have muscle pain or cramps, flushing and or swelling of the face, rapid heart beat, nausea, dizziness, fever, chills, headache, difficulty breathing, swelling in the arms or legs, or have a prickly feeling of your skin, contact a health care provider immediately. Following this discussion, the knee was prepped with Alcohol and under sterile condition the 80 mg Depo-Medrol and 6 cc Lidocaine injection was performed with a 20 gauge needle through a superolateral injection site. The needle was introduced into the joint, aspiration was performed to ensure intra-articular placement and the medication was injected. Upon withdrawal of the needle the site was cleaned with alcohol and a band aid applied. The patient tolerated the injection well and there were no adverse effects. Post injection instructions included no strenuous activity for 24 hours, cryotherapy and if there are any adverse effects to contact the office.

## 2019-04-02 NOTE — DISCUSSION/SUMMARY
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 She is a candidate for left total knee arthroplasty, \par Pt received left knee  cortisone injection today which will be the past one before her TKA planning in July. \par She is S/P Right total knee replacement done April 7, 2015 and has mild residual pain. She deferred from injections in the right knee pes bursa today. \par pt will return to office after sx.\par pt has Mhx of DM we will need to f.u with HGA1C.\par \par A knee replacement means resurfacing of all 3 surfaces of the patella, the femur, and tibia with metal and plastic parts. The prosthetic parts are usually cemented into position and well outpatient range of motion from full extension to about 120° of flexion. The postoperative motion, however, is determined by multiple factors, the most important of which is preoperative motion. In general, the better motion preoperatively, the better the motion postoperatively. The operation, pending medical clearance, gently requires hospitalization of 3-4 days for one knee, 5-7 days for bilateral knee replacements. In general, we prefer to perform the procedure under spinal anesthesia with femoral nerve block and occasional single shot sciatic nerve block. We may ask a patient to give 2 units of blood for bilateral total knee arthroplasties, for one knee we institute a normogram which may include administration of preoperative Procrit. The operative procedure takes probably 1-2 hours. The operation requires a straight incision anywhere from 5-7 inches down from the knee. Postoperatively, the patient is positioned in a continuous passive motion machine within the first 24 hours and is walking the day after surgery. The first couple days are very painful and the pain medication will alleviate, but not eliminate the pain. The patient must really push hard to get range of motion. Our goal for having a person go home as that the range of motion is approximately 0-90° of flexion and that they can walk with a walker or cane. A walking aid is to be dispensed once the patient is secure enough. In general there, there is no cast or brace required with routine knee replacement. In the long term, we do not encourage our patients to run for the sake of running, although pending their preoperative status, we often allow patient to play doubles tennis or comparative activities. We also allowed them to do gentle intermediate downhill skiing if they are truly an expert skier. Biking is encouraged as well swimming. The followup periods are usually 3 weeks, 6 weeks, 3 months, and yearly intervals. Potential complications with total knee replacement included anesthetic complications and death, infection around 1%, nerve damage, by which means peroneal nerve palsy, footdrop or flapping foot with ambulation. This is particularly more apt to occur in the patient with a valgus or knock-knee deformity. The incidence can be quite high in this particular patient population. There will be areas of skin numbness, but this is not an untoward effect nor do we consider it a complication. Other potential complications include dislocation of the patella component, usually less than 2%; loosening of the tibial or femoral component is much more infrequent. Most often this occurs with infection or long-term use. Patient of extreme risk including markedly overweight patient's may be more prone to prosthetic wear. Major blood vessel damage is also extremely rare. Directly because of the anatomic proximity of the popliteal artery this could be lacerated with subsequent repair required. Be it unlikely, disruption of the popliteal artery could theoretically result in amputation. Similarly, infection could theoretically result in amputation if one were to grow out of an organism that cannot be controlled with antibiotics. General medical complications include phlebitis, for which we would prophylactically anticoagulate patients, but could still occur, and fatal pulmonary embolus which has been reported. Cardiovascular problems, such as heart attack or ischemia are always a concern with such hemodynamic changes in the blood vascular system. Other General complications are rare, but anything medicine could theoretically happen. I think the patient understands the risk benefit ratio of total knee replacement and will think about whether there like to pursue with an operation or nonoperative treatment program. \par  \par  \par

## 2019-04-02 NOTE — PHYSICAL EXAM
[de-identified] : General: obese, but well-appearing and not in acute distress. \par Gait: normal. \par GENERAL APPEARANCE: Well nourished and hydrated, pleasant, alert, and oriented x 3. Appears their stated age. \par HEENT: Normocephalic, extraocular eye motion intact. Nasal septum midline. Oral cavity clear. External auditory canal clear. \par RESPIRATORY: Breath sounds clear and audible in all lobes. No wheezing, No accessory muscle use.\par CARDIOVASCULAR: No apparent abnormalities. No lower leg edema. No varicosities. Pedal pulses are palpable.\par NEUROLOGIC: Sensation is normal, no muscle weakness in the upper or lower extremities.\par DERMATOLOGIC: No apparent skin lesions, moist, warm, no rash.\par SPINE: Cervical spine appears normal and moves freely; thoracic spine appears normal and moves freely; lumbosacral spine appears normal and moves freely, normal, nontender.\par MUSCULOSKELETAL: Hands, wrists, and elbows are normal and move freely, shoulders are normal and move freely.\par \par Examination of right knee demonstrate healed midline incision.\par No joint effusion. no erythema. chest smooth and active full range of motion without pain, 0- 120°.\par \par Left knee examination shows varus alignment, medial joint line tenderness and pain with palpation pain and crepitus with range of motion. \par 5/5 motor strength in bilateral lower extremities. Sensory: Intact in bilateral lower extremities. DTRs: Biceps, brachioradialis, triceps, patellar, ankle and plantar 2+ and symmetric bilaterally. Pulses: dorsalis pedis, posterior tibial, femoral, popliteal, and radial 2+ and symmetric bilaterally. \par

## 2019-04-02 NOTE — HISTORY OF PRESENT ILLNESS
[Pain Location] : pain [Worsening] : worsening [___ yrs] : [unfilled] year(s) ago [7] : an average pain level of 7/10 [5] : a minimum pain level of 5/10 [9] : a maximum pain level of 9/10 [Standing] : standing [Daily] : ~He/She~ states the symptoms seem to be occuring daily [Walking] : worsened by walking [Ice] : relieved by ice [NSAIDs] : relieved by nonsteroidal anti-inflammatory drugs [Rest] : relieved by rest [de-identified] : 78 year old female presents for a follow up of left knee pain. The patient has been getting cortisone injections but the pain is getting worse. \par she has end-stage medial compartment osteoarthritis and she has been thinking TKA. She reports limited ADLs. \par \par She is also S/P Right total knee replacement done on April 7, 2015. pt has been getting pes bursa injection. \par although she is not 100% pain free form right TKA still better then left knee.  The patient requests repeat cortisone injections today. She would like to pursue left knee replacement \par

## 2019-04-08 ENCOUNTER — OTHER (OUTPATIENT)
Age: 79
End: 2019-04-08

## 2019-04-09 ENCOUNTER — OTHER (OUTPATIENT)
Age: 79
End: 2019-04-09

## 2019-04-19 ENCOUNTER — APPOINTMENT (OUTPATIENT)
Dept: FAMILY MEDICINE | Facility: CLINIC | Age: 79
End: 2019-04-19
Payer: MEDICARE

## 2019-04-19 VITALS
HEART RATE: 80 BPM | BODY MASS INDEX: 44.15 KG/M2 | DIASTOLIC BLOOD PRESSURE: 70 MMHG | WEIGHT: 265 LBS | HEIGHT: 65 IN | SYSTOLIC BLOOD PRESSURE: 130 MMHG

## 2019-04-19 PROCEDURE — 99214 OFFICE O/P EST MOD 30 MIN: CPT

## 2019-04-20 NOTE — PHYSICAL EXAM
[No Acute Distress] : no acute distress [Well Nourished] : well nourished [Well-Appearing] : well-appearing [Supple] : supple [No Respiratory Distress] : no respiratory distress  [Clear to Auscultation] : lungs were clear to auscultation bilaterally [Normal Rate] : normal rate  [Regular Rhythm] : with a regular rhythm [Non Tender] : non-tender [Soft] : abdomen soft [Normal Sclera/Conjunctiva] : normal sclera/conjunctiva [PERRL] : pupils equal round and reactive to light [No Lymphadenopathy] : no lymphadenopathy [No Accessory Muscle Use] : no accessory muscle use [Normal S1, S2] : normal S1 and S2 [Normal Bowel Sounds] : normal bowel sounds [Speech Grossly Normal] : speech grossly normal [Normal Mood] : the mood was normal [Normal Insight/Judgement] : insight and judgment were intact

## 2019-04-20 NOTE — HISTORY OF PRESENT ILLNESS
[FreeTextEntry1] : F/U HTN  [de-identified] : PATIENT PRESENTING FOR FOLLOW UP. CHRONIC HISTORY OF HYPOTHYROIDISM, DEPRESSION, HYPERTENSION,  HYPERLIPIDEMIA, JAVON AND DIABETES. ACTIVELY SEEING ORTHOPEDICS FOR OSTEOARTHRITIS OF LEFT KNEE. USES A CANE FOR SUPPORT AT TIMES.. PRIOR THYROID NODULE.  HAS NOT GONE FOR THYROID BIOPSY AS DIRECTED.  INTENTIONAL HEALTHY WEIGHT LOSS. BLOOD PRESSURE CONTROLLED ON METOPROLOL AND ON LIPITOR FOR CHOLESTEROL.  DUE TO SEE ENDOCRINOLOGY IN FOLLOW-UP.  HAS HAD NO CHEST PAIN, SHORTNESS OF BREATH, GI OR URINARY COMPLAINTS.  NO OTHER COMPLAINTS TODAY.

## 2019-04-20 NOTE — PLAN
[FreeTextEntry1] : BLOOD PRESSURE CONTROLLED\par CONTINUE CURRENT MEDICATIONS\par HEALTHY DIET AND LIFESTYLE MODIFICATIONS\par HEALTHY WEIGHT LOSS \par MONITOR LAB WORK INCLUDING LIPIDS\par DUE TO SEE ENDOCRINOLOGY IN FOLLOW-UP\par FOLLOW UP WITH SPECIALISTS AS DIRECTED\par WILL COMPLETE HANDICAP PARKING FORMS\par RE-ADVISED THYROID FNA OF NODULE - AWARE TO EVALUATE FOR POTENTIAL CANCER\par COMPLIANCE ADVISED TO PREVENT ADVERSE OUTCOMES TO HEALTH \par CALL WITH ANY QUESTIONS, CONCERNS, OR CHANGES \par

## 2019-04-20 NOTE — COUNSELING
[Weight management counseling provided] : Weight management [Healthy eating counseling provided] : healthy eating [Activity counseling provided] : activity [Behavioral health counseling provided] : behavioral health  [Low Fat Diet] : Low fat diet [Low Salt Diet] : Low salt diet [Decrease Portions] : Decrease food portions [Walking] : Walking [Engage in a relaxing activity] : Engage in a relaxing activity [None] : None

## 2019-04-24 ENCOUNTER — APPOINTMENT (OUTPATIENT)
Dept: PULMONOLOGY | Facility: CLINIC | Age: 79
End: 2019-04-24
Payer: MEDICARE

## 2019-04-24 VITALS — OXYGEN SATURATION: 93 %

## 2019-04-24 VITALS
HEIGHT: 65 IN | HEART RATE: 59 BPM | SYSTOLIC BLOOD PRESSURE: 130 MMHG | OXYGEN SATURATION: 91 % | BODY MASS INDEX: 44.32 KG/M2 | WEIGHT: 266 LBS | DIASTOLIC BLOOD PRESSURE: 60 MMHG

## 2019-04-24 LAB
ALBUMIN SERPL ELPH-MCNC: 4.2 G/DL
ALP BLD-CCNC: 87 U/L
ALT SERPL-CCNC: 20 U/L
ANION GAP SERPL CALC-SCNC: 11 MMOL/L
AST SERPL-CCNC: 19 U/L
BILIRUB SERPL-MCNC: <0.2 MG/DL
BUN SERPL-MCNC: 22 MG/DL
CALCIUM SERPL-MCNC: 9.7 MG/DL
CHLORIDE SERPL-SCNC: 104 MMOL/L
CHOLEST SERPL-MCNC: 148 MG/DL
CHOLEST/HDLC SERPL: 2.9 RATIO
CO2 SERPL-SCNC: 26 MMOL/L
CREAT SERPL-MCNC: 1.01 MG/DL
ESTIMATED AVERAGE GLUCOSE: 140 MG/DL
GLUCOSE SERPL-MCNC: 132 MG/DL
HBA1C MFR BLD HPLC: 6.5 %
HDLC SERPL-MCNC: 51 MG/DL
LDLC SERPL CALC-MCNC: 77 MG/DL
POTASSIUM SERPL-SCNC: 4.8 MMOL/L
PROT SERPL-MCNC: 7.1 G/DL
SODIUM SERPL-SCNC: 141 MMOL/L
T4 FREE SERPL-MCNC: 1.3 NG/DL
TRIGL SERPL-MCNC: 98 MG/DL
TSH SERPL-ACNC: 2.35 UIU/ML

## 2019-04-24 PROCEDURE — 99214 OFFICE O/P EST MOD 30 MIN: CPT

## 2019-04-24 NOTE — CONSULT LETTER
[Dear  ___] : Dear  [unfilled], [Consult Letter:] : I had the pleasure of evaluating your patient, [unfilled]. [Please see my note below.] : Please see my note below. [Sincerely,] : Sincerely, [Consult Closing:] : Thank you very much for allowing me to participate in the care of this patient.  If you have any questions, please do not hesitate to contact me. [DrNetpali  ___] : Dr. VEGA [Chip Pederson MD] : Chip Pederson MD [Director, Respiratory Care] : Director, Respiratory Care [[facility]] : [facility] [[facility]udenc[facility] White [facility], [facility]] : Prudencio White DO, [facility] [FreeTextEntry3] : Chip Pederson MD, FCCP, [facility]. [facility]\par

## 2019-04-24 NOTE — REVIEW OF SYSTEMS
[Dry Eyes] : dryness of the eyes [Recent Wt Gain (___ Lbs)] : recent [unfilled] ~Ulb weight gain [Hypertension] : ~T hypertension [Edema] : ~T edema was present [Diabetes] : diabetes mellitus [As Noted in HPI] : as noted in HPI [Negative] : Psychiatric [Fever] : no fever [Chills] : no chills [Eye Irritation] : no ~T irritation of the eyes [Nasal Congestion] : no nasal congestion [Epistaxis] : no nosebleeds [Postnasal Drip] : no postnasal drip [Sinus Problems] : no sinus problems [Sputum] : not coughing up ~M sputum [Cough] : no cough [Chest Tightness] : no chest tightness [Dyspnea] : no dyspnea [Pleuritic Pain] : no pleuritic pain [Wheezing] : no wheezing [Chest Discomfort] : no chest discomfort [Dysrhythmia] : no dysrhythmia [Palpitations] : no palpitations [Murmurs] : no murmurs were heard [Itchy Eyes] : no itching of ~T the eyes [Hay Fever] : no hay fever [Reflux] : no reflux [Vomiting] : no vomiting [Constipation] : no constipation [Nausea] : no nausea [Diarrhea] : no diarrhea [Abdominal Pain] : no abdominal pain [Dysuria] : no dysuria [Fracture] : no fracture [Trauma] : no ~T physical trauma [Headache] : no headache [Anemia] : no anemia [Syncope] : no fainting [Dizziness] : no dizziness [Paralysis] : no paralysis was seen [Numbness] : no numbness [Seizures] : no seizures [Depression] : no depression [Anxiety] : no anxiety [Thyroid Problem] : no thyroid problem

## 2019-04-24 NOTE — PHYSICAL EXAM
[General Appearance - Well Developed] : well developed [Normal Appearance] : normal appearance [General Appearance - Well Nourished] : well nourished [No Deformities] : no deformities [General Appearance - In No Acute Distress] : no acute distress [Normal Conjunctiva] : the conjunctiva exhibited no abnormalities [Low Lying Soft Palate] : low lying soft palate [Elongated Uvula] : elongated uvula [Enlarged Base of the Tongue] : enlargement of the base of the tongue [III] : III [Heart Rate And Rhythm] : heart rate and rhythm were normal [Neck Appearance] : the appearance of the neck was normal [Heart Sounds] : normal S1 and S2 [Murmurs] : no murmurs present [Edema] : no peripheral edema present [Exaggerated Use Of Accessory Muscles For Inspiration] : no accessory muscle use [Respiration, Rhythm And Depth] : normal respiratory rhythm and effort [Lungs Percussion] : the lungs were normal to percussion [Auscultation Breath Sounds / Voice Sounds] : lungs were clear to auscultation bilaterally [Abdomen Soft] : soft [Bowel Sounds] : normal bowel sounds [Nail Clubbing] : no clubbing of the fingernails [Abnormal Walk] : normal gait [Cyanosis, Localized] : no localized cyanosis [Oriented To Time, Place, And Person] : oriented to person, place, and time [No Focal Deficits] : no focal deficits [Affect] : the affect was normal [Memory Recent] : recent memory was not impaired [Impaired Insight] : insight and judgment were intact [Skin Turgor] : normal skin turgor [] : no rash [FreeTextEntry1] : Severe oropharyngeal crowding. [FreeTextEntry2] : left lower ext 2 plus edema

## 2019-04-24 NOTE — HISTORY OF PRESENT ILLNESS
[Inability to Lose Weight] : inability to lose weight [Excess Weight] : excess weight [Currently Experiencing] : The patient is currently experiencing symptoms. [Dyspnea] : dyspnea [Joint Pain] : joint pain [Low Calorie Diet] : low calorie diet [Fair Compliance] : fair compliance with treatment [Fair Tolerance] : fair tolerance of treatment [Poor Symptom Control] : poor symptom control [Sleep Apnea] : sleep apnea [Diabetes] : diabetes [High] : high [Hypertension] : hypertension [Well Balanced Diet] : well balanced meals [Low Calorie] : low calorie [Infrequently] : exercises infrequently [Aerobic Conditioning] : aerobic conditioning [Follow-Up - Routine Clinic] : a routine clinic follow-up of [CPAP] : CPAP [None] : No associated symptoms are reported [Good Compliance] : good compliance with treatment [Fair Symptom Control] : fair symptom control [Good Tolerance] : good tolerance of treatment [de-identified] : at 14 cm of water [FreeTextEntry1] : \par dyspnea at  baseline\par no fever, chill, chest pain\par no sig sputum\par compliant with cpap 14 with 02\par \par \par

## 2019-04-24 NOTE — REASON FOR VISIT
[Follow-Up] : a follow-up visit [Shortness of Breath] : shortness of Breath [Sleep Apnea] : sleep apnea [Spouse] : spouse [FreeTextEntry2] : JAVON

## 2019-04-24 NOTE — DISCUSSION/SUMMARY
[FreeTextEntry1] : Morbid Obesity/ deconditioning - chronic dyspnea stable, , mild pulmonary hypertension \par No significant airflow obstruction, \par Coronaries are normal, elevated LVEDP noted, mild decrease LV function\par Severe obstructive sleep apnea ( AHI 94) -  CPAP 14, now on 02 L\par previous ABG without hypercapnia\par  duplex neg 8/18 and CTA negative  for PE 8/18\par weight loss encouraged, cardiology follow up planned\par PFTs have been  stable, \par thyroid biopsy planned\par 6 months or sooner if needed

## 2019-04-29 ENCOUNTER — FORM ENCOUNTER (OUTPATIENT)
Age: 79
End: 2019-04-29

## 2019-04-30 ENCOUNTER — RESULT REVIEW (OUTPATIENT)
Age: 79
End: 2019-04-30

## 2019-04-30 ENCOUNTER — OUTPATIENT (OUTPATIENT)
Dept: OUTPATIENT SERVICES | Facility: HOSPITAL | Age: 79
LOS: 1 days | End: 2019-04-30
Payer: MEDICARE

## 2019-04-30 ENCOUNTER — APPOINTMENT (OUTPATIENT)
Dept: ULTRASOUND IMAGING | Facility: CLINIC | Age: 79
End: 2019-04-30
Payer: MEDICARE

## 2019-04-30 DIAGNOSIS — Z00.00 ENCOUNTER FOR GENERAL ADULT MEDICAL EXAMINATION WITHOUT ABNORMAL FINDINGS: ICD-10-CM

## 2019-04-30 DIAGNOSIS — Z98.89 OTHER SPECIFIED POSTPROCEDURAL STATES: Chronic | ICD-10-CM

## 2019-04-30 DIAGNOSIS — E04.1 NONTOXIC SINGLE THYROID NODULE: ICD-10-CM

## 2019-04-30 DIAGNOSIS — Z96.651 PRESENCE OF RIGHT ARTIFICIAL KNEE JOINT: Chronic | ICD-10-CM

## 2019-04-30 DIAGNOSIS — Z90.710 ACQUIRED ABSENCE OF BOTH CERVIX AND UTERUS: Chronic | ICD-10-CM

## 2019-04-30 DIAGNOSIS — Z90.49 ACQUIRED ABSENCE OF OTHER SPECIFIED PARTS OF DIGESTIVE TRACT: Chronic | ICD-10-CM

## 2019-04-30 PROCEDURE — 60100 BIOPSY OF THYROID: CPT

## 2019-04-30 PROCEDURE — 76942 ECHO GUIDE FOR BIOPSY: CPT

## 2019-04-30 PROCEDURE — 88173 CYTOPATH EVAL FNA REPORT: CPT

## 2019-04-30 PROCEDURE — 88173 CYTOPATH EVAL FNA REPORT: CPT | Mod: 26

## 2019-04-30 PROCEDURE — 76942 ECHO GUIDE FOR BIOPSY: CPT | Mod: 26

## 2019-05-07 ENCOUNTER — OTHER (OUTPATIENT)
Age: 79
End: 2019-05-07

## 2019-05-17 ENCOUNTER — NON-APPOINTMENT (OUTPATIENT)
Age: 79
End: 2019-05-17

## 2019-05-17 ENCOUNTER — APPOINTMENT (OUTPATIENT)
Dept: CARDIOLOGY | Facility: CLINIC | Age: 79
End: 2019-05-17
Payer: MEDICARE

## 2019-05-17 VITALS
HEART RATE: 61 BPM | OXYGEN SATURATION: 94 % | WEIGHT: 270 LBS | BODY MASS INDEX: 44.98 KG/M2 | RESPIRATION RATE: 18 BRPM | SYSTOLIC BLOOD PRESSURE: 170 MMHG | DIASTOLIC BLOOD PRESSURE: 78 MMHG | HEIGHT: 65 IN

## 2019-05-17 VITALS — SYSTOLIC BLOOD PRESSURE: 158 MMHG | DIASTOLIC BLOOD PRESSURE: 76 MMHG

## 2019-05-17 DIAGNOSIS — Z01.810 ENCOUNTER FOR PREPROCEDURAL CARDIOVASCULAR EXAMINATION: ICD-10-CM

## 2019-05-17 PROCEDURE — 93000 ELECTROCARDIOGRAM COMPLETE: CPT

## 2019-05-17 PROCEDURE — 93306 TTE W/DOPPLER COMPLETE: CPT

## 2019-05-17 PROCEDURE — 99214 OFFICE O/P EST MOD 30 MIN: CPT

## 2019-05-28 ENCOUNTER — APPOINTMENT (OUTPATIENT)
Dept: ENDOCRINOLOGY | Facility: CLINIC | Age: 79
End: 2019-05-28
Payer: MEDICARE

## 2019-05-28 VITALS
RESPIRATION RATE: 20 BRPM | WEIGHT: 270.13 LBS | HEIGHT: 65 IN | BODY MASS INDEX: 45.01 KG/M2 | SYSTOLIC BLOOD PRESSURE: 138 MMHG | DIASTOLIC BLOOD PRESSURE: 78 MMHG | OXYGEN SATURATION: 96 % | HEART RATE: 60 BPM

## 2019-05-28 PROCEDURE — 99205 OFFICE O/P NEW HI 60 MIN: CPT

## 2019-05-30 ENCOUNTER — FORM ENCOUNTER (OUTPATIENT)
Age: 79
End: 2019-05-30

## 2019-05-31 ENCOUNTER — OUTPATIENT (OUTPATIENT)
Dept: OUTPATIENT SERVICES | Facility: HOSPITAL | Age: 79
LOS: 1 days | End: 2019-05-31
Payer: MEDICARE

## 2019-05-31 ENCOUNTER — APPOINTMENT (OUTPATIENT)
Dept: ULTRASOUND IMAGING | Facility: CLINIC | Age: 79
End: 2019-05-31
Payer: MEDICARE

## 2019-05-31 ENCOUNTER — OTHER (OUTPATIENT)
Age: 79
End: 2019-05-31

## 2019-05-31 DIAGNOSIS — Z98.89 OTHER SPECIFIED POSTPROCEDURAL STATES: Chronic | ICD-10-CM

## 2019-05-31 DIAGNOSIS — Z90.710 ACQUIRED ABSENCE OF BOTH CERVIX AND UTERUS: Chronic | ICD-10-CM

## 2019-05-31 DIAGNOSIS — E03.9 HYPOTHYROIDISM, UNSPECIFIED: ICD-10-CM

## 2019-05-31 DIAGNOSIS — Z96.651 PRESENCE OF RIGHT ARTIFICIAL KNEE JOINT: Chronic | ICD-10-CM

## 2019-05-31 DIAGNOSIS — E78.5 HYPERLIPIDEMIA, UNSPECIFIED: ICD-10-CM

## 2019-05-31 DIAGNOSIS — Z90.49 ACQUIRED ABSENCE OF OTHER SPECIFIED PARTS OF DIGESTIVE TRACT: Chronic | ICD-10-CM

## 2019-05-31 DIAGNOSIS — E04.1 NONTOXIC SINGLE THYROID NODULE: ICD-10-CM

## 2019-05-31 DIAGNOSIS — I10 ESSENTIAL (PRIMARY) HYPERTENSION: ICD-10-CM

## 2019-05-31 PROCEDURE — 76536 US EXAM OF HEAD AND NECK: CPT

## 2019-05-31 PROCEDURE — 76536 US EXAM OF HEAD AND NECK: CPT | Mod: 26

## 2019-06-03 ENCOUNTER — RX RENEWAL (OUTPATIENT)
Age: 79
End: 2019-06-03

## 2019-06-04 LAB
ANION GAP SERPL CALC-SCNC: 14 MMOL/L
BUN SERPL-MCNC: 17 MG/DL
CALCIUM SERPL-MCNC: 9.8 MG/DL
CHLORIDE SERPL-SCNC: 104 MMOL/L
CO2 SERPL-SCNC: 23 MMOL/L
CREAT SERPL-MCNC: 1 MG/DL
GLUCOSE SERPL-MCNC: 171 MG/DL
POTASSIUM SERPL-SCNC: 4.8 MMOL/L
SODIUM SERPL-SCNC: 141 MMOL/L

## 2019-06-05 NOTE — HISTORY OF PRESENT ILLNESS
[FreeTextEntry1] : referred by PCP for DM2  management and NTMNG and hypoT \par Dm2  for 10 years . Takes oral agents.

## 2019-06-05 NOTE — PHYSICAL EXAM
[Alert] : alert [No Acute Distress] : no acute distress [Well Nourished] : well nourished [Well Developed] : well developed [Normal Sclera/Conjunctiva] : normal sclera/conjunctiva [EOMI] : extra ocular movement intact [No Proptosis] : no proptosis [Normal Oropharynx] : the oropharynx was normal [No Respiratory Distress] : no respiratory distress [No Accessory Muscle Use] : no accessory muscle use [Clear to Auscultation] : lungs were clear to auscultation bilaterally [Normal Rate] : heart rate was normal  [Normal S1, S2] : normal S1 and S2 [Regular Rhythm] : with a regular rhythm [Pedal Pulses Normal] : the pedal pulses are present [Normal Bowel Sounds] : normal bowel sounds [Not Tender] : non-tender [Soft] : abdomen soft [Not Distended] : not distended [Post Cervical Nodes] : posterior cervical nodes [Anterior Cervical Nodes] : anterior cervical nodes [Axillary Nodes] : axillary nodes [Normal] : normal and non tender [No Spinal Tenderness] : no spinal tenderness [Spine Straight] : spine straight [Normal Gait] : normal gait [No Stigmata of Cushings Syndrome] : no stigmata of cushings syndrome [Normal Strength/Tone] : muscle strength and tone were normal [No Rash] : no rash [No Tremors] : no tremors [Normal Reflexes] : deep tendon reflexes were 2+ and symmetric [Oriented x3] : oriented to person, place, and time [No Varicosities] : there were no varicosital changes [Acanthosis Nigricans] : no acanthosis nigricans [de-identified] : pitting edema B/L R ankle >> L ankle, varicose veins B/L  [de-identified] : thyroid enlarged mildly

## 2019-06-21 NOTE — HISTORY OF PRESENT ILLNESS
[FreeTextEntry1] : 78 year old female with a history of DM, HTN, HLD, LBBB, JAVON with CPAP, obesity, and hypothyroidism. She presents for routine follow up. She has chronic dyspnea on exertion, states is stable, and has been evaluated by pulmonary-Dr. White. Her LV function remains low normal but unchanged from last year.  Upon further questioning, her father had an unclear cardiac conduction with progressive conduction disease requiring a PPM.  She remembers some idea that he had a weak heart.  She denies PND, fatigue, palpitations, syncope or near syncope. She has chronic ankle edema, uses compressions stockings at times. LHC on 7/15/16 revealed normal coronaries. She is planning on a TKR in July.

## 2019-06-21 NOTE — REVIEW OF SYSTEMS
[Dyspnea on exertion] : dyspnea during exertion [Lower Ext Edema] : lower extremity edema [Negative] : Heme/Lymph [Recent Weight Gain (___ Lbs)] : no recent weight gain

## 2019-06-21 NOTE — PHYSICAL EXAM
[General Appearance - Well Developed] : well developed [Normal Appearance] : normal appearance [Well Groomed] : well groomed [General Appearance - Well Nourished] : well nourished [Normal Conjunctiva] : the conjunctiva exhibited no abnormalities [General Appearance - In No Acute Distress] : no acute distress [No Oral Pallor] : no oral pallor [No Oral Cyanosis] : no oral cyanosis [Normal Jugular Venous A Waves Present] : normal jugular venous A waves present [Normal Jugular Venous V Waves Present] : normal jugular venous V waves present [Exaggerated Use Of Accessory Muscles For Inspiration] : no accessory muscle use [Respiration, Rhythm And Depth] : normal respiratory rhythm and effort [Heart Rate And Rhythm] : heart rate and rhythm were normal [Auscultation Breath Sounds / Voice Sounds] : lungs were clear to auscultation bilaterally [Murmurs] : no murmurs present [Arterial Pulses Normal] : the arterial pulses were normal [Heart Sounds] : normal S1 and S2 [Abdomen Tenderness] : non-tender [Abdomen Soft] : soft [Nail Clubbing] : no clubbing of the fingernails [Abnormal Walk] : normal gait [Cyanosis, Localized] : no localized cyanosis [] : no rash [Skin Turgor] : normal skin turgor [Impaired Insight] : insight and judgment were intact [Affect] : the affect was normal [Oriented To Time, Place, And Person] : oriented to person, place, and time [Memory Recent] : recent memory was not impaired [FreeTextEntry1] : L> R bilateral edema with chronic varicose veins

## 2019-07-12 ENCOUNTER — APPOINTMENT (OUTPATIENT)
Dept: ORTHOPEDIC SURGERY | Facility: CLINIC | Age: 79
End: 2019-07-12
Payer: MEDICARE

## 2019-07-12 PROCEDURE — 20610 DRAIN/INJ JOINT/BURSA W/O US: CPT | Mod: LT

## 2019-07-12 PROCEDURE — 99213 OFFICE O/P EST LOW 20 MIN: CPT | Mod: 25

## 2019-07-12 NOTE — PROCEDURE
[de-identified] : patient received left knee cortisone injection.\par \par I discussed at length with the patient the planned steroid and lidocaine injection for primary osteoarthritis. The risks, benefits, convalescence and alternatives were reviewed and pt consented for injection. The possible side effects discussed included but were not limited to: pain, swelling, heat, bleeding, and redness. Symptoms are generally mild but if they are extensive then contact the office. Giving pain relievers by mouth such as NSAIDs or Tylenol can generally treat the reactions to steroid and lidocaine. Rare cases of infection have been noted. Rash, hives and itching may occur post injection. If you have muscle pain or cramps, flushing and or swelling of the face, rapid heart beat, nausea, dizziness, fever, chills, headache, difficulty breathing, swelling in the arms or legs, or have a prickly feeling of your skin, contact a health care provider immediately. Following this discussion, the knee was prepped with Alcohol and under sterile condition the 80 mg Depo-Medrol and 6 cc Lidocaine injection was performed with a 20 gauge needle through a superolateral injection site. The needle was introduced into the joint, aspiration was performed to ensure intra-articular placement and the medication was injected. Upon withdrawal of the needle the site was cleaned with alcohol and a band aid applied. The patient tolerated the injection well and there were no adverse effects. Post injection instructions included no strenuous activity for 24 hours, cryotherapy and if there are any adverse effects to contact the office.\par

## 2019-07-12 NOTE — HISTORY OF PRESENT ILLNESS
[Pain Location] : pain [Worsening] : worsening [___ yrs] : [unfilled] year(s) ago [7] : a current pain level of 7/10 [5] : an average pain level of 5/10 [4] : a minimum pain level of 4/10 [Standing] : standing [Daily] : ~He/She~ states the symptoms seem to be occuring daily [Walking] : worsened by walking [de-identified] : 78 year old female presents for a follow up of left knee pain. The patient has been getting cortisone injections but the pain is getting worse. \par she was supposed to have left TKA in July but she needs to take-year-old her  who needs a cardiac surgery. \par she has end-stage medial compartment osteoarthritis and she has been thinking TKA. She reports limited ADLs. \par patient is taking 3 tablets of Tylenol every 5 hours with minimal relief.\par  She is unable to take NSAIDs because prior history of leg swelling after taking Advil and Aleve.\par \par She is also S/P Right total knee replacement done on April 7, 2015. pt has been getting pes bursa injection. \par although she is not 100% pain free form right TKA still better then left knee.  The patient requests repeat cortisone injections today. She would like to pursue left knee replacement \par

## 2019-07-12 NOTE — DISCUSSION/SUMMARY
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 She is a candidate for left total knee arthroplasty, \par Pt received left knee  cortisone injection today. I provided topical lidocaine rx for pain relief. \par She is S/P Right total knee replacement done April 7, 2015 and has mild residual pain. \par pt has Mhx of DM we will need to f.u with HGA1C.\par \par A knee replacement means resurfacing of all 3 surfaces of the patella, the femur, and tibia with metal and plastic parts. The prosthetic parts are usually cemented into position and well outpatient range of motion from full extension to about 120° of flexion. The postoperative motion, however, is determined by multiple factors, the most important of which is preoperative motion. In general, the better motion preoperatively, the better the motion postoperatively. The operation, pending medical clearance, gently requires hospitalization of 3-4 days for one knee, 5-7 days for bilateral knee replacements. In general, we prefer to perform the procedure under spinal anesthesia with femoral nerve block and occasional single shot sciatic nerve block. We may ask a patient to give 2 units of blood for bilateral total knee arthroplasties, for one knee we institute a normogram which may include administration of preoperative Procrit. The operative procedure takes probably 1-2 hours. The operation requires a straight incision anywhere from 5-7 inches down from the knee. Postoperatively, the patient is positioned in a continuous passive motion machine within the first 24 hours and is walking the day after surgery. The first couple days are very painful and the pain medication will alleviate, but not eliminate the pain. The patient must really push hard to get range of motion. Our goal for having a person go home as that the range of motion is approximately 0-90° of flexion and that they can walk with a walker or cane. A walking aid is to be dispensed once the patient is secure enough. In general there, there is no cast or brace required with routine knee replacement. In the long term, we do not encourage our patients to run for the sake of running, although pending their preoperative status, we often allow patient to play doubles tennis or comparative activities. We also allowed them to do gentle intermediate downhill skiing if they are truly an expert skier. Biking is encouraged as well swimming. The followup periods are usually 3 weeks, 6 weeks, 3 months, and yearly intervals. Potential complications with total knee replacement included anesthetic complications and death, infection around 1%, nerve damage, by which means peroneal nerve palsy, footdrop or flapping foot with ambulation. This is particularly more apt to occur in the patient with a valgus or knock-knee deformity. The incidence can be quite high in this particular patient population. There will be areas of skin numbness, but this is not an untoward effect nor do we consider it a complication. Other potential complications include dislocation of the patella component, usually less than 2%; loosening of the tibial or femoral component is much more infrequent. Most often this occurs with infection or long-term use. Patient of extreme risk including markedly overweight patient's may be more prone to prosthetic wear. Major blood vessel damage is also extremely rare. Directly because of the anatomic proximity of the popliteal artery this could be lacerated with subsequent repair required. Be it unlikely, disruption of the popliteal artery could theoretically result in amputation. Similarly, infection could theoretically result in amputation if one were to grow out of an organism that cannot be controlled with antibiotics. General medical complications include phlebitis, for which we would prophylactically anticoagulate patients, but could still occur, and fatal pulmonary embolus which has been reported. Cardiovascular problems, such as heart attack or ischemia are always a concern with such hemodynamic changes in the blood vascular system. Other General complications are rare, but anything medicine could theoretically happen. I think the patient understands the risk benefit ratio of total knee replacement and will think about whether there like to pursue with an operation or nonoperative treatment program. \par  \par  \par

## 2019-07-12 NOTE — PHYSICAL EXAM
[de-identified] : General: obese, but well-appearing and not in acute distress. \par Gait: normal. \par GENERAL APPEARANCE: Well nourished and hydrated, pleasant, alert, and oriented x 3. Appears their stated age. \par HEENT: Normocephalic, extraocular eye motion intact. Nasal septum midline. Oral cavity clear. External auditory canal clear. \par RESPIRATORY: Breath sounds clear and audible in all lobes. No wheezing, No accessory muscle use.\par CARDIOVASCULAR: No apparent abnormalities. No lower leg edema. No varicosities. Pedal pulses are palpable.\par NEUROLOGIC: Sensation is normal, no muscle weakness in the upper or lower extremities.\par DERMATOLOGIC: No apparent skin lesions, moist, warm, no rash.\par SPINE: Cervical spine appears normal and moves freely; thoracic spine appears normal and moves freely; lumbosacral spine appears normal and moves freely, normal, nontender.\par MUSCULOSKELETAL: Hands, wrists, and elbows are normal and move freely, shoulders are normal and move freely.\par \par Examination of right knee demonstrate healed midline incision.\par No joint effusion. no erythema. chest smooth and active full range of motion without pain, 0- 120°.\par \par Left knee examination shows varus alignment, medial joint line tenderness and pain with palpation pain and crepitus with range of motion. \par 5/5 motor strength in bilateral lower extremities. Sensory: Intact in bilateral lower extremities. DTRs: Biceps, brachioradialis, triceps, patellar, ankle and plantar 2+ and symmetric bilaterally. Pulses: dorsalis pedis, posterior tibial, femoral, popliteal, and radial 2+ and symmetric bilaterally. \par

## 2019-07-24 ENCOUNTER — APPOINTMENT (OUTPATIENT)
Dept: FAMILY MEDICINE | Facility: CLINIC | Age: 79
End: 2019-07-24
Payer: MEDICARE

## 2019-07-24 VITALS
WEIGHT: 273 LBS | BODY MASS INDEX: 45.48 KG/M2 | SYSTOLIC BLOOD PRESSURE: 132 MMHG | DIASTOLIC BLOOD PRESSURE: 68 MMHG | HEIGHT: 65 IN | OXYGEN SATURATION: 95 % | HEART RATE: 61 BPM

## 2019-07-24 PROCEDURE — 99214 OFFICE O/P EST MOD 30 MIN: CPT

## 2019-07-24 NOTE — COUNSELING
[Weight management counseling provided] : Weight management [Healthy eating counseling provided] : healthy eating [Activity counseling provided] : activity [Low Fat Diet] : Low fat diet [Decrease Portions] : Decrease food portions [Low Salt Diet] : Low salt diet [Walking] : Walking [None] : None

## 2019-07-24 NOTE — PHYSICAL EXAM
[No Acute Distress] : no acute distress [Well Nourished] : well nourished [Well-Appearing] : well-appearing [No Lymphadenopathy] : no lymphadenopathy [Supple] : supple [No Respiratory Distress] : no respiratory distress  [No Accessory Muscle Use] : no accessory muscle use [Clear to Auscultation] : lungs were clear to auscultation bilaterally [Normal Rate] : normal rate  [Regular Rhythm] : with a regular rhythm [Normal S1, S2] : normal S1 and S2 [No Murmur] : no murmur heard [Pedal Pulses Present] : the pedal pulses are present [No Edema] : there was no peripheral edema [Soft] : abdomen soft [Non Tender] : non-tender [Normal Bowel Sounds] : normal bowel sounds [Speech Grossly Normal] : speech grossly normal [Normal Affect] : the affect was normal [Normal Mood] : the mood was normal [Normal Insight/Judgement] : insight and judgment were intact [Normal Sclera/Conjunctiva] : normal sclera/conjunctiva [PERRL] : pupils equal round and reactive to light

## 2019-07-24 NOTE — PLAN
[FreeTextEntry1] : MONITOR LAB WORK FOR LIPIDS\par HEALTHY DIET AND LIFESTYLE MODIFICATIONS\par HEALTHY WEIGHT LOSS \par RX BREAST IMAGING AND BONE DENSITY \par BLOOD PRESSURE CONTROLLED \par STRESS REDUCTION, GOOD SUPPORT SYSTEM\par CONTINUE ALL MEDICATIONS AS DIRECTED\par FOLLOW-UP ALL SPECIALISTS AS DIRECTED; ENDOCRINOLOGY CONSULTANT NOTE APPRECIATED\par CALL WITH ANY QUESTIONS, CONCERNS OR CHANGES.

## 2019-07-24 NOTE — HISTORY OF PRESENT ILLNESS
[FreeTextEntry1] : F/U HTN. [de-identified] : MS. MERIDA IS A PLEASANT 77 YO PRESENTING FOR FOLLOW-UP. CHRONIC HISTORY OF HYPOTHYROIDISM, DEPRESSION, HYPERTENSION AND HYPERLIPIDEMIA. BLOOD PRESSURE CONTROLLED ON METOPROLOL AND ON LIPITOR FOR CHOLESTEROL. SAW ENDOCRINOLOGY, DR. HUDSON IN FOLLOW-UP OF DIABETES AND THYROID NODULE. TO HAVE THYROID US. DOING WELL ON LEXAPRO. NO SUICIDAL/HOMICIDAL IDEATIONS OR PLANS. SAW ORTHOPEDICS. CONTINUES TO HAVE INJECTIONS WITH FLEETING RELIEF. TO HAVE LEFT KNEE REPLACEMENT. SAW CARDIOLOGY AND HAD PET/CT THAT WAS REPORTEDLY "NORMAL". SAW PULMONOLOGY IN FOLLOW-UP OF JAVON. DUE FOR MAMMOGRAM. DENIES BREAST PAIN, LUMPS OR NIPPLE DISCHARGE. DUE FOR BONE DENSITY. IS ACTIVELY TRYING TO LOSE WEIGHT. DIET IS GOOD. NOT EXERCISING REGULARLY DUE TO LEFT KNEE PAIN. HAS HAD NO CHEST PAIN, SHORTNESS OF BREATH, HEADACHE, DIZZINESS, GI OR URINARY COMPLAINTS. NO OTHER COMPLAINTS TODAY.

## 2019-08-14 ENCOUNTER — TRANSCRIPTION ENCOUNTER (OUTPATIENT)
Age: 79
End: 2019-08-14

## 2019-09-09 ENCOUNTER — RX RENEWAL (OUTPATIENT)
Age: 79
End: 2019-09-09

## 2019-09-16 LAB
ALBUMIN SERPL ELPH-MCNC: 4.1 G/DL
ALP BLD-CCNC: 90 U/L
ALT SERPL-CCNC: 19 U/L
ANION GAP SERPL CALC-SCNC: 15 MMOL/L
AST SERPL-CCNC: 20 U/L
BASOPHILS # BLD AUTO: 0.11 K/UL
BASOPHILS NFR BLD AUTO: 1.3 %
BILIRUB SERPL-MCNC: 0.3 MG/DL
BUN SERPL-MCNC: 17 MG/DL
CALCIUM SERPL-MCNC: 9.7 MG/DL
CHLORIDE SERPL-SCNC: 104 MMOL/L
CHOLEST SERPL-MCNC: 148 MG/DL
CHOLEST/HDLC SERPL: 3.3 RATIO
CO2 SERPL-SCNC: 22 MMOL/L
CREAT SERPL-MCNC: 0.97 MG/DL
CREAT SPEC-SCNC: 108 MG/DL
EOSINOPHIL # BLD AUTO: 0.36 K/UL
EOSINOPHIL NFR BLD AUTO: 4.2 %
ESTIMATED AVERAGE GLUCOSE: 148 MG/DL
GLUCOSE SERPL-MCNC: 144 MG/DL
HBA1C MFR BLD HPLC: 6.8 %
HCT VFR BLD CALC: 41.8 %
HDLC SERPL-MCNC: 45 MG/DL
HGB BLD-MCNC: 13 G/DL
IMM GRANULOCYTES NFR BLD AUTO: 0.2 %
LDLC SERPL CALC-MCNC: 71 MG/DL
LDLC SERPL DIRECT ASSAY-MCNC: 87 MG/DL
LYMPHOCYTES # BLD AUTO: 2.44 K/UL
LYMPHOCYTES NFR BLD AUTO: 28.2 %
MAN DIFF?: NORMAL
MCHC RBC-ENTMCNC: 29.6 PG
MCHC RBC-ENTMCNC: 31.1 GM/DL
MCV RBC AUTO: 95.2 FL
MICROALBUMIN 24H UR DL<=1MG/L-MCNC: 1.7 MG/DL
MICROALBUMIN/CREAT 24H UR-RTO: 16 MG/G
MONOCYTES # BLD AUTO: 0.53 K/UL
MONOCYTES NFR BLD AUTO: 6.1 %
NEUTROPHILS # BLD AUTO: 5.18 K/UL
NEUTROPHILS NFR BLD AUTO: 60 %
PLATELET # BLD AUTO: 305 K/UL
POTASSIUM SERPL-SCNC: 4.2 MMOL/L
PROT SERPL-MCNC: 6.8 G/DL
RBC # BLD: 4.39 M/UL
RBC # FLD: 14.7 %
SODIUM SERPL-SCNC: 141 MMOL/L
T4 FREE SERPL-MCNC: 1.3 NG/DL
TRIGL SERPL-MCNC: 158 MG/DL
TSH SERPL-ACNC: 3.05 UIU/ML
WBC # FLD AUTO: 8.64 K/UL

## 2019-09-17 ENCOUNTER — APPOINTMENT (OUTPATIENT)
Dept: ENDOCRINOLOGY | Facility: CLINIC | Age: 79
End: 2019-09-17
Payer: MEDICARE

## 2019-09-17 VITALS
SYSTOLIC BLOOD PRESSURE: 120 MMHG | WEIGHT: 269 LBS | DIASTOLIC BLOOD PRESSURE: 66 MMHG | HEART RATE: 66 BPM | HEIGHT: 65 IN | BODY MASS INDEX: 44.82 KG/M2

## 2019-09-17 PROCEDURE — 99214 OFFICE O/P EST MOD 30 MIN: CPT

## 2019-09-17 NOTE — PHYSICAL EXAM
[Alert] : alert [No Acute Distress] : no acute distress [Well Nourished] : well nourished [Well Developed] : well developed [Normal Sclera/Conjunctiva] : normal sclera/conjunctiva [EOMI] : extra ocular movement intact [No Proptosis] : no proptosis [Normal Oropharynx] : the oropharynx was normal [No Respiratory Distress] : no respiratory distress [No Accessory Muscle Use] : no accessory muscle use [Clear to Auscultation] : lungs were clear to auscultation bilaterally [Normal Rate] : heart rate was normal  [Normal S1, S2] : normal S1 and S2 [Regular Rhythm] : with a regular rhythm [Pedal Pulses Normal] : the pedal pulses are present [Normal Bowel Sounds] : normal bowel sounds [Soft] : abdomen soft [Not Tender] : non-tender [Not Distended] : not distended [Post Cervical Nodes] : posterior cervical nodes [Anterior Cervical Nodes] : anterior cervical nodes [Axillary Nodes] : axillary nodes [Normal] : normal and non tender [No Spinal Tenderness] : no spinal tenderness [Spine Straight] : spine straight [No Stigmata of Cushings Syndrome] : no stigmata of cushings syndrome [Normal Gait] : normal gait [Normal Strength/Tone] : muscle strength and tone were normal [No Rash] : no rash [Normal Reflexes] : deep tendon reflexes were 2+ and symmetric [No Tremors] : no tremors [Oriented x3] : oriented to person, place, and time [Acanthosis Nigricans] : no acanthosis nigricans [de-identified] : thyroid enlarged mildly  [de-identified] : pitting edema B/L R ankle >> L ankle, varicose veins B/L

## 2019-09-17 NOTE — ASSESSMENT
[Carbohydrate Consistent Diet] : carbohydrate consistent diet [Diabetes Foot Care] : diabetes foot care [Long Term Vascular Complications] : long term vascular complications of diabetes [Importance of Diet and Exercise] : importance of diet and exercise to improve glycemic control, achieve weight loss and improve cardiovascular health [Self Monitoring of Blood Glucose] : self monitoring of blood glucose [Retinopathy Screening] : Patient was referred to ophthalmology for retinopathy screening [FreeTextEntry1] : Dm2 well controlled A1c 6.8 in patient with morbid obesity and HTN and hLD.She is trying to work on loosing some weight and she lost 4 lbs. \par \par Dm2: in good control. Bgs 130-135. No hypoG \par continue metformin 1000 mg BID \par continue glimepiride 2 mg qd \par discussed diet and exercise\par encouraged more exercise walking 30 min 3 x week\par Discussed complications of diabetes at length including MI/CVA/ESRD/dialysis/blindness/amputations/infections\par Needs to try to have more protein for meals\par admits poor diet eating eating carbs \par GFR normal. \par cecilia/c ratio normal. \par continue ASA \par eye exam May 2019: reportedly no DR \par foot exam normal today sensory and proprioception. \par bgs 1x day but she does not check daily. \par flushot done. \par \par NTMNG \par s/p FNA LEFT  nodule 1.3 cm nondiagnostic nodule in [facility] 2018.\par Images reviewed and compared to prior study.\par L dominant nodule is stable since Aug 2018. \par Images revised and has rim calcifications that can lead to FNA being nondiagnostic results again. \par Also considering her age , we decided to monitor for now and repeat US in 6 mo.\par \par hypoT : continue LT4 50 mcg \par pt euthyroid clinically and biochemically.\par Take daily in AM on an empty stomach at least 30 minutes before eating or taking other medication.\par \par obesity: discussed diet and exercise\par encouraged more exercise walking 30 min 3 x week\par \par HTN :  bp at target on meds. Continue current management.\par discussed low salt diet \par \par HLD: \par LDL at target.\par continue atorvastatin 20 mg qd \par \par

## 2019-10-02 ENCOUNTER — APPOINTMENT (OUTPATIENT)
Dept: ORTHOPEDIC SURGERY | Facility: CLINIC | Age: 79
End: 2019-10-02
Payer: MEDICARE

## 2019-10-02 VITALS
SYSTOLIC BLOOD PRESSURE: 148 MMHG | BODY MASS INDEX: 44.82 KG/M2 | DIASTOLIC BLOOD PRESSURE: 76 MMHG | HEIGHT: 65 IN | HEART RATE: 60 BPM | WEIGHT: 269 LBS

## 2019-10-02 PROCEDURE — 99214 OFFICE O/P EST MOD 30 MIN: CPT | Mod: 25

## 2019-10-02 PROCEDURE — 20610 DRAIN/INJ JOINT/BURSA W/O US: CPT | Mod: 50

## 2019-10-02 NOTE — DISCUSSION/SUMMARY
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 She is a candidate for left TKA, but wishes to continue with conservative treatment. Pt does have MHx of DM we will need to F/U with HbA1c should she wish to pursue surgery in the future. She elected to receive a cortisone injection in her left knee which she tolerated well.\par She is also s/p right TKA DOS 4/7/2015 with pes bursitis of the right knee. She has responded well to right knee pes bursa cortisone injections in the past and she elected to receive repeat injection today. \par For her back pain, I provided her with a prescription for Flexeril. Should her back pain persist I will provide her with a referral to physiatry for evaluation and treatment of her back pain. F/U 3 months for repeat cortisone injections if needed or to discuss left TKA. \par \par A knee replacement means resurfacing of all 3 surfaces of the patella, the femur, and tibia with metal and plastic parts. The prosthetic parts are usually cemented into position and well outpatient range of motion from full extension to about 120° of flexion. The postoperative motion, however, is determined by multiple factors, the most important of which is preoperative motion. In general, the better motion preoperatively, the better the motion postoperatively. The operation, pending medical clearance, gently requires hospitalization of 3-4 days for one knee, 5-7 days for bilateral knee replacements. In general, we prefer to perform the procedure under spinal anesthesia with femoral nerve block and occasional single shot sciatic nerve block. We may ask a patient to give 2 units of blood for bilateral total knee arthroplasties, for one knee we institute a normogram which may include administration of preoperative Procrit. The operative procedure takes probably 1-2 hours. The operation requires a straight incision anywhere from 5-7 inches down from the knee. Postoperatively, the patient is positioned in a continuous passive motion machine within the first 24 hours and is walking the day after surgery. The first couple days are very painful and the pain medication will alleviate, but not eliminate the pain. The patient must really push hard to get range of motion. Our goal for having a person go home as that the range of motion is approximately 0-90° of flexion and that they can walk with a walker or cane. A walking aid is to be dispensed once the patient is secure enough. In general there, there is no cast or brace required with routine knee replacement. In the long term, we do not encourage our patients to run for the sake of running, although pending their preoperative status, we often allow patient to play doubles tennis or comparative activities. We also allowed them to do gentle intermediate downhill skiing if they are truly an expert skier. Biking is encouraged as well swimming. The followup periods are usually 3 weeks, 6 weeks, 3 months, and yearly intervals. Potential complications with total knee replacement included anesthetic complications and death, infection around 1%, nerve damage, by which means peroneal nerve palsy, footdrop or flapping foot with ambulation. This is particularly more apt to occur in the patient with a valgus or knock-knee deformity. The incidence can be quite high in this particular patient population. There will be areas of skin numbness, but this is not an untoward effect nor do we consider it a complication. Other potential complications include dislocation of the patella component, usually less than 2%; loosening of the tibial or femoral component is much more infrequent. Most often this occurs with infection or long-term use. Patient of extreme risk including markedly overweight patient's may be more prone to prosthetic wear. Major blood vessel damage is also extremely rare. Directly because of the anatomic proximity of the popliteal artery this could be lacerated with subsequent repair required. Be it unlikely, disruption of the popliteal artery could theoretically result in amputation. Similarly, infection could theoretically result in amputation if one were to grow out of an organism that cannot be controlled with antibiotics. General medical complications include phlebitis, for which we would prophylactically anticoagulate patients, but could still occur, and fatal pulmonary embolus which has been reported. Cardiovascular problems, such as heart attack or ischemia are always a concern with such hemodynamic changes in the blood vascular system. Other General complications are rare, but anything medicine could theoretically happen. I think the patient understands the risk benefit ratio of total knee replacement and will think about whether there like to pursue with an operation or nonoperative treatment program. I reviewed the plan of care as well as a model of a total knee implant equivalent to the one that will be used for their total knee joint replacement. The patient agreed to the plan of care as well as the use of implants for their knee total joint replacement.

## 2019-10-02 NOTE — ADDENDUM
[FreeTextEntry1] : I, Kailash Traore, acted solely as a scribe for Dr. Nicholas Davidson on this date 10/02/2019.

## 2019-10-02 NOTE — REASON FOR VISIT
[Follow-Up Visit] : a follow-up visit for [Other: ____] : [unfilled] [FreeTextEntry2] : .S/P Right total knee replacement DOS: April 7, 2015.

## 2019-10-02 NOTE — HISTORY OF PRESENT ILLNESS
[1] : a current pain level of 1/10 [Standing] : standing [Intermit.] : ~He/She~ states the symptoms seem to be intermittent [Bending] : worsened by bending [Walking] : worsened by walking [Recumbency] : relieved by recumbency [Rest] : relieved by rest [Stable] : stable [Acetaminophen] : relieved by acetaminophen [de-identified] : 79 year old female here for evaluation of left knee pain. At last visit 7/12/2019 she received cortisone injection to the left knee with relief for only 1 week. She requests additional cortisone injection today. She takes Tylenol with minimal pain relief. \par She is also s/p right TKA DOS 4/7/2015. She has been receiving right knee pes bursa injections with some relief. Patient states right knee is throbbing, would like injection.\par She does c/o low back pain which radiates to the lower extremities. Pain is worse when ambulating.  [de-identified] : cortisone injection

## 2019-10-02 NOTE — PHYSICAL EXAM
[LE] : Sensory: Intact in bilateral lower extremities [ALL] : dorsalis pedis, posterior tibial, femoral, popliteal, and radial 2+ and symmetric bilaterally [Normal] : Alert and in no acute distress [Obese] : obese [Antalgic] : not antalgic [Poor Appearance] : well-appearing [Acute Distress] : not in acute distress [de-identified] : GENERAL APPEARANCE: Well nourished and hydrated, pleasant, alert, and oriented x 3. Appears their stated age. \par HEENT: Normocephalic, extraocular eye motion intact. Nasal septum midline. Oral cavity clear. External auditory canal clear. \par RESPIRATORY: Breath sounds clear and audible in all lobes. No wheezing, No accessory muscle use.\par CARDIOVASCULAR: No apparent abnormalities. No lower leg edema. No varicosities. Pedal pulses are palpable.\par NEUROLOGIC: Sensation is normal, no muscle weakness in the upper or lower extremities.\par DERMATOLOGIC: No apparent skin lesions, moist, warm, no rash.\par SPINE: Cervical spine appears normal and moves freely; thoracic spine appears normal and moves freely; lumbosacral spine appears normal and moves freely, normal, nontender.\par MUSCULOSKELETAL: Hands, wrists, and elbows are normal and move freely, shoulders are normal and move freely.  [de-identified] : Examination of right knee demonstrates healed midline incision, no obvious signs of infection, no joint effusion, no erythema, smooth and active full range of motion without pain 0- 120 degrees, tenderness over the pes anserine bursa. \par Left knee examination shows varus alignment, medial joint line tenderness and pain with palpation, crepitus with range of motion. \par

## 2019-10-02 NOTE — PROCEDURE
[de-identified] : I injected the patient's left knee today with cortisone.\par \par I discussed at length with the patient the planned steroid and lidocaine injection. The risks, benefits, convalescence and alternatives were reviewed. The possible side effects discussed included but were not limited to: pain, swelling, heat, bleeding, and redness. Symptoms are generally mild but if they are extensive then contact the office. Giving pain relievers by mouth such as NSAIDs or Tylenol can generally treat the reactions to steroid and lidocaine. Rare cases of infection have been noted. Rash, hives and itching may occur post injection. If you have muscle pain or cramps, flushing and or swelling of the face, rapid heart beat, nausea, dizziness, fever, chills, headache, difficulty breathing, swelling in the arms or legs, or have a prickly feeling of your skin, contact a health care provider immediately. Following this discussion, the knee was prepped with Alcohol and under sterile condition the 80 mg Depo-Medrol and 6 cc Lidocaine injection was performed with a 20 gauge needle through a superolateral injection site. The needle was introduced into the joint, aspiration was performed to ensure intra-articular placement and the medication was injected. Upon withdrawal of the needle the site was cleaned with alcohol and a band aid applied. The patient tolerated the injection well and there were no adverse effects. Post injection instructions included no strenuous activity for 24 hours, cryotherapy and if there are any adverse effects to contact the office. \par \par \par  I injected the patient's right pes bursa today with cortisone.\par \par I discussed at length with the patient the planned steroid and lidocaine injection. The risks, benefits, convalescence and alternatives were reviewed. The possible side effects discussed included but were not limited to: pain, swelling, heat, bleeding, and redness. Symptoms are generally mild but if they are extensive then contact the office. Giving pain relievers by mouth such as NSAIDs or Tylenol can generally treat the reactions to steroid and lidocaine. Rare cases of infection have been noted. Rash, hives and itching may occur post injection. If you have muscle pain or cramps, flushing and or swelling of the face, rapid heart beat, nausea, dizziness, fever, chills, headache, difficulty breathing, swelling in the arms or legs, or have a prickly feeling of your skin, contact a health care provider immediately. Following this discussion, the knee was prepped with Alcohol and under sterile condition the 80 mg Depo-Medrol and 6 cc Lidocaine injection was performed with a 20 gauge needle through a bursa injection site. The needle was introduced into the pes bursa aspiration was performed to ensure bursal placement and the medication was injected. Upon withdrawal of the needle the site was cleaned with alcohol and a band aid applied. The patient tolerated the injection well and there were no adverse effects. Post injection instructions included no strenuous activity for 24 hours, cryotherapy and if there are any adverse effects to contact the office. \par \par \par

## 2019-10-04 ENCOUNTER — APPOINTMENT (OUTPATIENT)
Dept: FAMILY MEDICINE | Facility: CLINIC | Age: 79
End: 2019-10-04
Payer: MEDICARE

## 2019-10-04 VITALS
HEIGHT: 65 IN | HEART RATE: 70 BPM | DIASTOLIC BLOOD PRESSURE: 76 MMHG | SYSTOLIC BLOOD PRESSURE: 146 MMHG | BODY MASS INDEX: 44.65 KG/M2 | WEIGHT: 268 LBS

## 2019-10-04 VITALS — SYSTOLIC BLOOD PRESSURE: 130 MMHG | DIASTOLIC BLOOD PRESSURE: 72 MMHG

## 2019-10-04 PROCEDURE — 99214 OFFICE O/P EST MOD 30 MIN: CPT

## 2019-10-06 NOTE — REVIEW OF SYSTEMS
[Postnasal Drip] : postnasal drip [Cough] : cough [Joint Pain] : joint pain [Fatigue] : no fatigue [Fever] : no fever [Chills] : no chills [Pain] : no pain [Vision Problems] : no vision problems [Discharge] : no discharge [Earache] : no earache [Palpitations] : no palpitations [Sore Throat] : no sore throat [Chest Pain] : no chest pain [Lower Ext Edema] : no lower extremity edema [Shortness Of Breath] : no shortness of breath [Abdominal Pain] : no abdominal pain [Vomiting] : no vomiting [Diarrhea] : diarrhea [Hematuria] : no hematuria [Melena] : no melena [Dysuria] : no dysuria [Muscle Weakness] : no muscle weakness [Fainting] : no fainting [Suicidal] : not suicidal [Depression] : no depression

## 2019-10-06 NOTE — HISTORY OF PRESENT ILLNESS
[FreeTextEntry1] : F/U HTN.  [de-identified] : MS. MERIDA IS A PLEASANT 78 YO PRESENTING FOR FOLLOW-UP. CHRONIC HISTORY OF HYPOTHYROIDISM, DEPRESSION, HYPERTENSION AND HYPERLIPIDEMIA. BLOOD PRESSURE USUALLY WELL CONTROLLED ON METOPROLOL. REPORTS NORMAL BLOOD PRESSURE WITH ENDOCRINOLOGY RECENTLY. ON LIPITOR FOR CHOLESTEROL. ROUTINELY SEES ENDOCRINOLOGY, DR. HUDSON IN FOLLOW-UP OF DIABETES AND THYROID NODULE. DOING WELL ON LEXAPRO. NO SUICIDAL/HOMICIDAL IDEATIONS OR PLANS. GETTING ALONG WELL WITH OTHERS. SAW ORTHOPEDICS. CONTINUES TO HAVE INJECTIONS WITH FLEETING RELIEF. TO HAVE LEFT KNEE REPLACEMENT. TODAY WITH COMPLAINT OF POST-NASAL DRIP WITH OCCASIONAL COUGH THE PAST 2 DAYS. HAS HAD NO FEVER OR OTHER ASSOCIATED SYMPTOMS.  HAS HAD NO CHEST PAIN, SHORTNESS OF BREATH, HEADACHE, DIZZINESS, GI OR URINARY COMPLAINTS. NO OTHER COMPLAINTS TODAY.

## 2019-10-06 NOTE — PLAN
[FreeTextEntry1] : ENDOCRINOLOGY AND ORTHOPEDICS CONSULTANT NOTES APPRECIATED\par SUPPORTIVE CARE: REST, FLUIDS, STEAM\par USE OF MUCINEX\par RX NASAL SPRAY - REVIEWED USE\par LIKELY VIRAL URI - TO CALL IF SYMPTOMS PERSIST/WORSEN\par DOING WELL ON CURRENT DOSAGE OF LEXAPRO\par STRESS REDUCTION EXERCISES, GOOD SUPPORT SYSTEM\par CONTINUE ALL MEDICATIONS AS DIRECTED\par HEALTHY DIET AND LIFESTYLE MODIFICATIONS\par HEALTHY WEIGHT LOSS \par FOLLOW-UP ALL SPECIALISTS AS DIRECTED \par CALL WITH ANY QUESTIONS, CONCERNS OR CHANGES.

## 2019-10-06 NOTE — PHYSICAL EXAM
[No Acute Distress] : no acute distress [Well-Appearing] : well-appearing [Well Nourished] : well nourished [Normal Sclera/Conjunctiva] : normal sclera/conjunctiva [PERRL] : pupils equal round and reactive to light [Normal Outer Ear/Nose] : the outer ears and nose were normal in appearance [EOMI] : extraocular movements intact [Normal Oropharynx] : the oropharynx was normal [Normal TMs] : both tympanic membranes were normal [No Lymphadenopathy] : no lymphadenopathy [Supple] : supple [No Accessory Muscle Use] : no accessory muscle use [No Respiratory Distress] : no respiratory distress  [Normal Rate] : normal rate  [Clear to Auscultation] : lungs were clear to auscultation bilaterally [Regular Rhythm] : with a regular rhythm [Normal S1, S2] : normal S1 and S2 [No Murmur] : no murmur heard [Speech Grossly Normal] : speech grossly normal [Normal Affect] : the affect was normal [Normal Mood] : the mood was normal [Normal Insight/Judgement] : insight and judgment were intact [Pedal Pulses Present] : the pedal pulses are present [No Edema] : there was no peripheral edema [Normal Bowel Sounds] : normal bowel sounds [Non Tender] : non-tender [Soft] : abdomen soft [Alert and Oriented x3] : oriented to person, place, and time

## 2019-10-25 ENCOUNTER — APPOINTMENT (OUTPATIENT)
Dept: PULMONOLOGY | Facility: CLINIC | Age: 79
End: 2019-10-25
Payer: MEDICARE

## 2019-10-25 VITALS
HEIGHT: 65 IN | WEIGHT: 268 LBS | BODY MASS INDEX: 44.65 KG/M2 | HEART RATE: 72 BPM | OXYGEN SATURATION: 93 % | DIASTOLIC BLOOD PRESSURE: 76 MMHG | SYSTOLIC BLOOD PRESSURE: 118 MMHG

## 2019-10-25 VITALS — OXYGEN SATURATION: 94 %

## 2019-10-25 PROCEDURE — 99214 OFFICE O/P EST MOD 30 MIN: CPT

## 2019-10-25 NOTE — DISCUSSION/SUMMARY
[FreeTextEntry1] : Morbid Obesity/ deconditioning - chronic dyspnea stable, mild pulmonary hypertension \par No significant airflow obstruction, \par Coronaries are normal, elevated LVEDP noted, mild decrease LV function\par Severe obstructive sleep apnea ( AHI 94) -  CPAP 14, now on 02 L\par previous ABG without hypercapnia\par  duplex neg 8/18 and CTA negative  for PE 8/18\par weight loss encouraged, cardiology follow up planned\par PFTs have been  stable, \par thyroid biopsy indeterminate , followed by Endocrinology\par 6 months or sooner if needed, vaccinations this fall , CXR ordered

## 2019-10-25 NOTE — REVIEW OF SYSTEMS
[Fever] : no fever [Chills] : no chills [Recent Wt Gain (___ Lbs)] : recent [unfilled] ~Ulb weight gain [Dry Eyes] : dryness of the eyes [Epistaxis] : no nosebleeds [Nasal Congestion] : no nasal congestion [Eye Irritation] : no ~T irritation of the eyes [Sinus Problems] : no sinus problems [Postnasal Drip] : no postnasal drip [Cough] : no cough [Dyspnea] : no dyspnea [Sputum] : not coughing up ~M sputum [Chest Tightness] : no chest tightness [Pleuritic Pain] : no pleuritic pain [Wheezing] : no wheezing [Hypertension] : ~T hypertension [Chest Discomfort] : no chest discomfort [Dysrhythmia] : no dysrhythmia [Murmurs] : no murmurs were heard [Edema] : ~T edema was present [Palpitations] : no palpitations [Reflux] : no reflux [Hay Fever] : no hay fever [Itchy Eyes] : no itching of ~T the eyes [Nausea] : no nausea [Constipation] : no constipation [Diarrhea] : no diarrhea [Vomiting] : no vomiting [Dysuria] : no dysuria [Abdominal Pain] : no abdominal pain [Trauma] : no ~T physical trauma [Fracture] : no fracture [Headache] : no headache [Anemia] : no anemia [Dizziness] : no dizziness [Syncope] : no fainting [Seizures] : no seizures [Paralysis] : no paralysis was seen [Numbness] : no numbness [Depression] : no depression [Anxiety] : no anxiety [Thyroid Problem] : no thyroid problem [Diabetes] : diabetes mellitus [As Noted in HPI] : as noted in HPI [Negative] : Psychiatric

## 2019-10-25 NOTE — HISTORY OF PRESENT ILLNESS
[FreeTextEntry1] : has URI\par dyspnea at  baseline\par no fever, chill, chest pain\par no sig sputum\par compliant with cpap 14 with 02\par \par \par

## 2019-10-25 NOTE — PHYSICAL EXAM
[General Appearance - Well Developed] : well developed [General Appearance - Well Nourished] : well nourished [Normal Appearance] : normal appearance [General Appearance - In No Acute Distress] : no acute distress [No Deformities] : no deformities [Normal Conjunctiva] : the conjunctiva exhibited no abnormalities [Low Lying Soft Palate] : low lying soft palate [Elongated Uvula] : elongated uvula [Enlarged Base of the Tongue] : enlargement of the base of the tongue [III] : III [Heart Rate And Rhythm] : heart rate and rhythm were normal [Neck Appearance] : the appearance of the neck was normal [Heart Sounds] : normal S1 and S2 [Murmurs] : no murmurs present [Edema] : no peripheral edema present [Exaggerated Use Of Accessory Muscles For Inspiration] : no accessory muscle use [Respiration, Rhythm And Depth] : normal respiratory rhythm and effort [Auscultation Breath Sounds / Voice Sounds] : lungs were clear to auscultation bilaterally [Lungs Percussion] : the lungs were normal to percussion [Bowel Sounds] : normal bowel sounds [Abdomen Soft] : soft [FreeTextEntry1] : no chest wall [Abnormal Walk] : normal gait [Cyanosis, Localized] : no localized cyanosis [Nail Clubbing] : no clubbing of the fingernails [No Focal Deficits] : no focal deficits [FreeTextEntry2] : left lower ext 2 plus edema [Oriented To Time, Place, And Person] : oriented to person, place, and time [Impaired Insight] : insight and judgment were intact [Affect] : the affect was normal [Memory Recent] : recent memory was not impaired [Skin Turgor] : normal skin turgor [] : no rash

## 2019-10-25 NOTE — CONSULT LETTER
[Dear  ___] : Dear  [unfilled], [Consult Letter:] : I had the pleasure of evaluating your patient, [unfilled]. [Consult Closing:] : Thank you very much for allowing me to participate in the care of this patient.  If you have any questions, please do not hesitate to contact me. [Sincerely,] : Sincerely, [Please see my note below.] : Please see my note below. [FreeTextEntry3] : Prudencio White DO [facility]\par Pulmonary Critical Care\par Director Pulmonary Division\par Medical Director Respiratory Therapy\par [facility]\par \par \par  [Chip Pederson MD] : Chip Pederson MD [DrNeptali  ___] : Dr. VEGA [Director, Respiratory Care] : Director, Respiratory Care [[facility] DO, [facility]] : Prudencio White DO, [facility] [[facility]] : [facility]

## 2019-10-31 ENCOUNTER — APPOINTMENT (OUTPATIENT)
Dept: PULMONOLOGY | Facility: CLINIC | Age: 79
End: 2019-10-31
Payer: MEDICARE

## 2019-10-31 VITALS
HEIGHT: 65 IN | BODY MASS INDEX: 45.15 KG/M2 | OXYGEN SATURATION: 92 % | SYSTOLIC BLOOD PRESSURE: 130 MMHG | WEIGHT: 271 LBS | DIASTOLIC BLOOD PRESSURE: 82 MMHG | HEART RATE: 72 BPM

## 2019-10-31 PROCEDURE — 99214 OFFICE O/P EST MOD 30 MIN: CPT

## 2019-10-31 NOTE — PROCEDURE
[FreeTextEntry1] : Prior PFTs were essentially normal with a mildly reduced diffusion capacity which corrected for alveolar volume.

## 2019-10-31 NOTE — REVIEW OF SYSTEMS
[Dry Eyes] : dryness of the eyes [Hypertension] : ~T hypertension [Edema] : ~T edema was present [Diabetes] : diabetes mellitus [As Noted in HPI] : as noted in HPI [Fever] : no fever [Chills] : no chills [Eye Irritation] : no ~T irritation of the eyes [Nasal Congestion] : no nasal congestion [Epistaxis] : no nosebleeds [Postnasal Drip] : no postnasal drip [Sinus Problems] : no sinus problems [Cough] : no cough [Sputum] : not coughing up ~M sputum [Dyspnea] : no dyspnea [Chest Tightness] : no chest tightness [Pleuritic Pain] : no pleuritic pain [Wheezing] : no wheezing [Chest Discomfort] : no chest discomfort [Dysrhythmia] : no dysrhythmia [Murmurs] : no murmurs were heard [Palpitations] : no palpitations [Hay Fever] : no hay fever [Itchy Eyes] : no itching of ~T the eyes [Reflux] : no reflux [Nausea] : no nausea [Vomiting] : no vomiting [Constipation] : no constipation [Diarrhea] : no diarrhea [Abdominal Pain] : no abdominal pain [Dysuria] : no dysuria [Trauma] : no ~T physical trauma [Fracture] : no fracture [Anemia] : no anemia [Headache] : no headache [Dizziness] : no dizziness [Syncope] : no fainting [Numbness] : no numbness [Paralysis] : no paralysis was seen [Seizures] : no seizures [Depression] : no depression [Anxiety] : no anxiety [Thyroid Problem] : no thyroid problem

## 2019-10-31 NOTE — CONSULT LETTER
[Dear  ___] : Dear  [unfilled], [Consult Letter:] : I had the pleasure of evaluating your patient, [unfilled]. [Please see my note below.] : Please see my note below. [Consult Closing:] : Thank you very much for allowing me to participate in the care of this patient.  If you have any questions, please do not hesitate to contact me. [Sincerely,] : Sincerely, [Chip Pederson MD] : Chip Pederson MD [FreeTextEntry3] : Chip Pederson MD, FCCP, D. ABSM\par Pulmonary and Sleep Medicine\par [facility] [place]\par \par

## 2019-10-31 NOTE — REASON FOR VISIT
[Follow-Up] : a follow-up visit [Sleep Apnea] : sleep apnea [Spouse] : spouse [FreeTextEntry2] : JAVON, obesity

## 2019-10-31 NOTE — DISCUSSION/SUMMARY
[FreeTextEntry1] : \par  #1. Continue CPAP at 14 cm of water for severe JAVON; gave pt [employer] F20 in the past which she prefers over the [place] FFM\par  #2. Diet and exercise for weight loss\par  #3. F/u in 12 months with compliance\par #4. Replace equipment as needed; ordered 10/31/19\par #5. F/u with Dr. White for pulmonary issues\par  #6. Cardiology f/u

## 2019-10-31 NOTE — HISTORY OF PRESENT ILLNESS
[Excess Weight] : excess weight [Inability to Lose Weight] : inability to lose weight [Currently Experiencing] : The patient is currently experiencing symptoms. [Dyspnea] : dyspnea [Joint Pain] : joint pain [Low Calorie Diet] : low calorie diet [Fair Compliance] : fair compliance with treatment [Fair Tolerance] : fair tolerance of treatment [Poor Symptom Control] : poor symptom control [Sleep Apnea] : sleep apnea [Diabetes] : diabetes [Hypertension] : hypertension [High] : high [Low Calorie] : low calorie [Well Balanced Diet] : well balanced meals [Infrequently] : exercises infrequently [Aerobic Conditioning] : aerobic conditioning [Follow-Up - Routine Clinic] : a routine clinic follow-up of [None] : No associated symptoms are reported [CPAP] : CPAP [Good Compliance] : good compliance with treatment [Good Tolerance] : good tolerance of treatment [FreeTextEntry1] : Patient c/o SOBOE but is otherwise without associated respiratory complaints.\par  [Good Symptom Control] : good symptom control [de-identified] : at 14 cm of water

## 2019-10-31 NOTE — PHYSICAL EXAM
[General Appearance - Well Developed] : well developed [Normal Appearance] : normal appearance [General Appearance - In No Acute Distress] : no acute distress [Normal Conjunctiva] : the conjunctiva exhibited no abnormalities [Low Lying Soft Palate] : low lying soft palate [Elongated Uvula] : elongated uvula [Enlarged Base of the Tongue] : enlargement of the base of the tongue [IV] : IV [Neck Appearance] : the appearance of the neck was normal [Heart Rate And Rhythm] : heart rate and rhythm were normal [Heart Sounds] : normal S1 and S2 [Murmurs] : no murmurs present [Edema] : no peripheral edema present [] : no respiratory distress [Respiration, Rhythm And Depth] : normal respiratory rhythm and effort [Exaggerated Use Of Accessory Muscles For Inspiration] : no accessory muscle use [Auscultation Breath Sounds / Voice Sounds] : lungs were clear to auscultation bilaterally [Bowel Sounds] : normal bowel sounds [Abdomen Soft] : soft [Abnormal Walk] : normal gait [Nail Clubbing] : no clubbing of the fingernails [Cyanosis, Localized] : no localized cyanosis [No Focal Deficits] : no focal deficits [Oriented To Time, Place, And Person] : oriented to person, place, and time [FreeTextEntry1] : No abnormalities

## 2019-11-01 ENCOUNTER — RX RENEWAL (OUTPATIENT)
Age: 79
End: 2019-11-01

## 2019-11-07 NOTE — ED ADULT NURSE NOTE - FALL HARM RISK TYPE OF ASSESSMENT
Physical Therapy Evaluation     Patient's Name: Nigel Morales    Admitting Diagnosis  Diarrhea [R19 7]  Colitis [K52 9]  Uncooperative behavior [F91 9]    Problem List  Patient Active Problem List   Diagnosis    Failure to thrive in adult    Gastroenteritis    Hypokalemia due to excessive gastrointestinal loss of potassium    Uncooperative behavior    Acute bilateral low back pain without sciatica       Past Medical History  Past Medical History:   Diagnosis Date    Anxiety        Past Surgical History  Past Surgical History:   Procedure Laterality Date     SECTION            19 0842   Note Type   Note type Eval only   Pain Assessment   Pain Assessment 0-10   Pain Score 8   Pain Type Acute pain   Pain Location Ear   Pain Orientation Bilateral   Pain Frequency Constant/continuous   Pain Onset Ongoing   Clinical Progression Not changed   Patient's Stated Pain Goal No pain   Home Living   Type of [address]   (no AD or DME at baseline)   Additional Comments pt did not give much PLOF or social history at this time, chart review states "Pt reported that she was recently at [facility] but was kicked out and then the police brought her to [address] [place] [address] "   Prior Function   Level of [place] Independent with ADLs and functional mobility   Lives With Family   ADL Assistance Independent   IADLs Independent   Falls in the last 6 months 1 to 4  (pt reporting 2 falls)   Vocational   ()   Restrictions/Precautions   Weight Bearing Precautions Per Order No   Braces or Orthoses   (none per chart review or pt report)   Other Precautions Pain;1:1;Impulsive;Cognitive   General   Family/Caregiver Present No   Cognition   Arousal/Participation Alert   Orientation Level Oriented X4   Following Commands Follows all commands and directions without difficulty   Comments pt agreeable to PT session   RUE Assessment   RUE Assessment WFL   LUE Assessment   LUE Assessment WFL   RLE Assessment   RLE Assessment WFL  (grossly 5/5 throughout)   LLE Assessment   LLE Assessment WFL  (grossly 5/5 throughout)   Coordination   Movements are Fluid and Coordinated 1   Sensation WFL   Bed Mobility   Supine to Sit 6  Modified independent   Additional items HOB elevated   Sit to Supine 6  Modified independent   Additional items HOB elevated   Transfers   Sit to Stand 7  Independent   Stand to Sit 7  Independent   Ambulation/Elevation   Gait pattern WNL  (minimal arm swing, no overt LOB or veering)   Gait Assistance 7  Independent   Assistive Device None   Distance 220 ft   Stair Management Assistance Not tested   Balance   Static Sitting Normal   Dynamic Sitting Normal   Static Standing Normal   Dynamic Standing Good   Ambulatory Good   Activity Tolerance   Activity Tolerance Patient tolerated treatment well;Treatment limited secondary to agitation   Medical Staff Made Aware CM Taya Bullard made aware of PT recs   Nurse Made Aware Yes, RN Job Iraheta verbalized pt appropriate for PT session, made aware of outcomes/recs   Assessment   Prognosis Good   Problem List Pain   Assessment Pt is 41 y o  female seen for PT evaluation s/p admit to [facility] on 11/4/2019 w/ Gastroenteritis  PT consulted to assess pt's functional mobility and d/c needs  Order placed for PT eval and tx, w/ up w/ A order  Comorbidities affecting pt's physical performance at time of assessment include: acute bilateral LBP s/p fall, uncooperative behavior, hypokalemia, failure to thrive in adult, gastroenteritis  PTA, pt was independent w/ all functional mobility w/ no AD or DME and ambulates unrestricted distances and all terrain  Personal factors affecting pt at time of IE include: decreased cognition, positive fall history, decreased initiation and engagement, impulsivity and limited insight into impairments  Please find objective findings from PT assessment regarding body systems outlined above   The following objective measures performed on IE: Barthel Index: 90/100 and Modified Matt: 1 (no significant disability)  From PT/mobility standpoint, pt appears to be functioning close to or at mobility baseline, therefore no further immediate skilled PT needs are warranted at this time  Pt currently performing all phases of functional mobility at independent level without need for AD  Recommend pt continue to mobilize ad christ while here  Recommend pt return to previous living environment once medically cleared  Will sign off, D/C PT  Please reconsult if further immediate skilled PT needs are warranted     Goals   Patient Goals "to get out of here and go home"   PT Treatment Day 0   Plan   Treatment/Interventions Spoke to nursing;Spoke to case management   PT Frequency One time visit   Recommendation   Recommendation D/C PT;Home independently   Equipment Recommended   (none at this time)   PT - OK to Discharge Yes  (when medically cleared)   Modified [place] Scale   Modified [place] Scale 1   Barthel Index   Feeding 10   Bathing 5   Grooming Score 5   Dressing Score 10   Bladder Score 10   Bowels Score 10   Toilet Use Score 10   Transfers (Bed/Chair) Score 15   Mobility (Level Surface) Score 15   Stairs Score 0  (DNT)   Barthel Index Score 90         Day [place], PT Daily Assessment

## 2019-11-15 ENCOUNTER — APPOINTMENT (OUTPATIENT)
Dept: CARDIOLOGY | Facility: CLINIC | Age: 79
End: 2019-11-15
Payer: MEDICARE

## 2019-11-15 ENCOUNTER — NON-APPOINTMENT (OUTPATIENT)
Age: 79
End: 2019-11-15

## 2019-11-15 VITALS — SYSTOLIC BLOOD PRESSURE: 146 MMHG | DIASTOLIC BLOOD PRESSURE: 70 MMHG

## 2019-11-15 VITALS
WEIGHT: 268 LBS | BODY MASS INDEX: 44.65 KG/M2 | HEIGHT: 65 IN | DIASTOLIC BLOOD PRESSURE: 66 MMHG | HEART RATE: 64 BPM | OXYGEN SATURATION: 96 % | SYSTOLIC BLOOD PRESSURE: 150 MMHG

## 2019-11-15 PROCEDURE — 93000 ELECTROCARDIOGRAM COMPLETE: CPT

## 2019-11-15 PROCEDURE — 99214 OFFICE O/P EST MOD 30 MIN: CPT

## 2019-12-13 ENCOUNTER — RX RENEWAL (OUTPATIENT)
Age: 79
End: 2019-12-13

## 2019-12-16 ENCOUNTER — FORM ENCOUNTER (OUTPATIENT)
Age: 79
End: 2019-12-16

## 2019-12-17 ENCOUNTER — OUTPATIENT (OUTPATIENT)
Dept: OUTPATIENT SERVICES | Facility: HOSPITAL | Age: 79
LOS: 1 days | End: 2019-12-17
Payer: COMMERCIAL

## 2019-12-17 ENCOUNTER — APPOINTMENT (OUTPATIENT)
Dept: ULTRASOUND IMAGING | Facility: CLINIC | Age: 79
End: 2019-12-17
Payer: MEDICARE

## 2019-12-17 DIAGNOSIS — Z90.49 ACQUIRED ABSENCE OF OTHER SPECIFIED PARTS OF DIGESTIVE TRACT: Chronic | ICD-10-CM

## 2019-12-17 DIAGNOSIS — Z00.00 ENCOUNTER FOR GENERAL ADULT MEDICAL EXAMINATION WITHOUT ABNORMAL FINDINGS: ICD-10-CM

## 2019-12-17 DIAGNOSIS — Z98.89 OTHER SPECIFIED POSTPROCEDURAL STATES: Chronic | ICD-10-CM

## 2019-12-17 DIAGNOSIS — Z90.710 ACQUIRED ABSENCE OF BOTH CERVIX AND UTERUS: Chronic | ICD-10-CM

## 2019-12-17 DIAGNOSIS — E04.1 NONTOXIC SINGLE THYROID NODULE: ICD-10-CM

## 2019-12-17 DIAGNOSIS — Z96.651 PRESENCE OF RIGHT ARTIFICIAL KNEE JOINT: Chronic | ICD-10-CM

## 2019-12-17 PROCEDURE — 76536 US EXAM OF HEAD AND NECK: CPT | Mod: 26

## 2019-12-17 PROCEDURE — 76536 US EXAM OF HEAD AND NECK: CPT

## 2019-12-19 LAB
ALBUMIN SERPL ELPH-MCNC: 4.3 G/DL
ALP BLD-CCNC: 91 U/L
ALT SERPL-CCNC: 18 U/L
ANION GAP SERPL CALC-SCNC: 14 MMOL/L
AST SERPL-CCNC: 19 U/L
BASOPHILS # BLD AUTO: 0.1 K/UL
BASOPHILS NFR BLD AUTO: 1 %
BILIRUB SERPL-MCNC: 0.4 MG/DL
BUN SERPL-MCNC: 20 MG/DL
CALCIUM SERPL-MCNC: 10.3 MG/DL
CHLORIDE SERPL-SCNC: 101 MMOL/L
CHOLEST SERPL-MCNC: 151 MG/DL
CHOLEST/HDLC SERPL: 3.3 RATIO
CO2 SERPL-SCNC: 27 MMOL/L
CREAT SERPL-MCNC: 1.2 MG/DL
EOSINOPHIL # BLD AUTO: 0.39 K/UL
EOSINOPHIL NFR BLD AUTO: 3.9 %
GLUCOSE SERPL-MCNC: 157 MG/DL
HCT VFR BLD CALC: 45 %
HDLC SERPL-MCNC: 46 MG/DL
HGB BLD-MCNC: 13.8 G/DL
IMM GRANULOCYTES NFR BLD AUTO: 0.4 %
LDLC SERPL CALC-MCNC: 71 MG/DL
LDLC SERPL DIRECT ASSAY-MCNC: 85 MG/DL
LYMPHOCYTES # BLD AUTO: 2.81 K/UL
LYMPHOCYTES NFR BLD AUTO: 27.8 %
MAN DIFF?: NORMAL
MCHC RBC-ENTMCNC: 29.1 PG
MCHC RBC-ENTMCNC: 30.7 GM/DL
MCV RBC AUTO: 94.9 FL
MONOCYTES # BLD AUTO: 0.72 K/UL
MONOCYTES NFR BLD AUTO: 7.1 %
NEUTROPHILS # BLD AUTO: 6.03 K/UL
NEUTROPHILS NFR BLD AUTO: 59.8 %
PLATELET # BLD AUTO: 353 K/UL
POTASSIUM SERPL-SCNC: 5.4 MMOL/L
PROT SERPL-MCNC: 7.2 G/DL
RBC # BLD: 4.74 M/UL
RBC # FLD: 14 %
SODIUM SERPL-SCNC: 142 MMOL/L
T3 SERPL-MCNC: 119 NG/DL
T4 FREE SERPL-MCNC: 1.3 NG/DL
TRIGL SERPL-MCNC: 170 MG/DL
TSH SERPL-ACNC: 2.99 UIU/ML
WBC # FLD AUTO: 10.09 K/UL

## 2019-12-20 LAB
ESTIMATED AVERAGE GLUCOSE: 154 MG/DL
HBA1C MFR BLD HPLC: 7 %

## 2020-01-08 ENCOUNTER — APPOINTMENT (OUTPATIENT)
Dept: ORTHOPEDIC SURGERY | Facility: CLINIC | Age: 80
End: 2020-01-08
Payer: MEDICARE

## 2020-01-08 VITALS
WEIGHT: 268 LBS | HEIGHT: 65 IN | BODY MASS INDEX: 44.65 KG/M2 | SYSTOLIC BLOOD PRESSURE: 155 MMHG | DIASTOLIC BLOOD PRESSURE: 75 MMHG | HEART RATE: 76 BPM

## 2020-01-08 PROCEDURE — 20610 DRAIN/INJ JOINT/BURSA W/O US: CPT

## 2020-01-08 PROCEDURE — 99214 OFFICE O/P EST MOD 30 MIN: CPT | Mod: 25

## 2020-01-08 NOTE — ADDENDUM
[FreeTextEntry1] : I, Kailash Traore, acted solely as a scribe for Dr. Nicholas Davidson on this date 01/08/2020.

## 2020-01-08 NOTE — PHYSICAL EXAM
[de-identified] : Examination of right knee demonstrates healed midline incision, no obvious signs of infection, no joint effusion, no erythema, smooth and active full range of motion without pain 0- 120 degrees, tenderness over the pes anserine bursa. \par Left knee examination shows varus alignment, medial joint line tenderness and pain with palpation, crepitus with range of motion. \par  [de-identified] : GENERAL APPEARANCE: Well nourished and hydrated, pleasant, alert, and oriented x 3. Appears their stated age. \par HEENT: Normocephalic, extraocular eye motion intact. Nasal septum midline. Oral cavity clear. External auditory canal clear. \par RESPIRATORY: Breath sounds clear and audible in all lobes. No wheezing, No accessory muscle use.\par CARDIOVASCULAR: No apparent abnormalities. No lower leg edema. No varicosities. Pedal pulses are palpable.\par NEUROLOGIC: Sensation is normal, no muscle weakness in the upper or lower extremities.\par DERMATOLOGIC: No apparent skin lesions, moist, warm, no rash.\par SPINE: Cervical spine appears normal and moves freely; thoracic spine appears normal and moves freely; lumbosacral spine appears normal and moves freely, normal, nontender.\par MUSCULOSKELETAL: Hands, wrists, and elbows are normal and move freely, shoulders are normal and move freely.

## 2020-01-08 NOTE — PROCEDURE
[de-identified] : I injected the patient's left knee joint today with cortisone.\par \par I discussed at length with the patient the planned steroid and lidocaine injection. The risks, benefits, convalescence and alternatives were reviewed. The possible side effects discussed included but were not limited to: pain, swelling, heat, bleeding, and redness. Symptoms are generally mild but if they are extensive then contact the office. Giving pain relievers by mouth such as NSAIDs or Tylenol can generally treat the reactions to steroid and lidocaine. Rare cases of infection have been noted. Rash, hives and itching may occur post injection. If you have muscle pain or cramps, flushing and or swelling of the face, rapid heart beat, nausea, dizziness, fever, chills, headache, difficulty breathing, swelling in the arms or legs, or have a prickly feeling of your skin, contact a health care provider immediately. Following this discussion, the knee was prepped with Alcohol and under sterile condition the 80 mg Depo-Medrol and 6 cc Lidocaine injection was performed with a 20 gauge needle through a superolateral injection site. The needle was introduced into the joint, aspiration was performed to ensure intra-articular placement and the medication was injected. Upon withdrawal of the needle the site was cleaned with alcohol and a band aid applied. The patient tolerated the injection well and there were no adverse effects. Post injection instructions included no strenuous activity for 24 hours, cryotherapy and if there are any adverse effects to contact the office. \par \par \par  I injected the patient's right knee pes bursa today with cortisone.\par \par I discussed at length with the patient the planned steroid and lidocaine injection. The risks, benefits, convalescence and alternatives were reviewed. The possible side effects discussed included but were not limited to: pain, swelling, heat, bleeding, and redness. Symptoms are generally mild but if they are extensive then contact the office. Giving pain relievers by mouth such as NSAIDs or Tylenol can generally treat the reactions to steroid and lidocaine. Rare cases of infection have been noted. Rash, hives and itching may occur post injection. If you have muscle pain or cramps, flushing and or swelling of the face, rapid heart beat, nausea, dizziness, fever, chills, headache, difficulty breathing, swelling in the arms or legs, or have a prickly feeling of your skin, contact a health care provider immediately. Following this discussion, the knee was prepped with Alcohol and under sterile condition the 80 mg Depo-Medrol and 6 cc Lidocaine injection was performed with a 20 gauge needle through a bursa injection site. The needle was introduced into the pes bursa aspiration was performed to ensure bursal placement and the medication was injected. Upon withdrawal of the needle the site was cleaned with alcohol and a band aid applied. The patient tolerated the injection well and there were no adverse effects. Post injection instructions included no strenuous activity for 24 hours, cryotherapy and if there are any adverse effects to contact the office. \par

## 2020-01-08 NOTE — HISTORY OF PRESENT ILLNESS
[Pain Location] : pain [7] : a current pain level of 7/10 [Stable] : stable [5] : an average pain level of 5/10 [3] : a minimum pain level of 3/10 [Walking] : worsened by walking [Knee Flexion] : worsened with knee flexion [de-identified] : 79 year old obese female here for f/u evaluation of bilateral knee pain. She is s/p right TKA DOS: 4/7/2015. Patient had cortisone injection to the left knee intraarticular and right knee pes bursa in October 2019. The injections provided good relief. She requests repeat cortisone injections today. She c/o constant bilateral lower extremity pain as well as pain in her feet, she is diabetic. She c/o left leg severe intermittent swelling.\par

## 2020-01-08 NOTE — DISCUSSION/SUMMARY
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 She is a candidate for left TKA, but wishes to continue with conservative treatment. Pt does have MHx of DM we will need to F/U with HbA1c should she wish to pursue surgery in the future. She elected to receive a cortisone injection in her left knee joint which she tolerated well. \par She is also s/p right TKA DOS 4/7/2015 with pes bursitis of the right knee. She has responded well to right knee pes bursa cortisone injections in the past. She received cortisone injection to right knee pes bursa today. \par I provided her with referral to neurology to rule-out lower extremity neuropathy. \par F/U 3 Months\par \par A knee replacement means resurfacing of all 3 surfaces of the patella, the femur, and tibia with metal and plastic parts. The prosthetic parts are usually cemented into position and well outpatient range of motion from full extension to about 120° of flexion. The postoperative motion, however, is determined by multiple factors, the most important of which is preoperative motion. In general, the better motion preoperatively, the better the motion postoperatively. The operation, pending medical clearance, gently requires hospitalization of 1 to 2 days for one knee, 2 to 4 days for bilateral knee replacements. In general, we prefer to perform the procedure under spinal anesthesia with femoral nerve block and occasional single shot sciatic nerve block. We may ask a patient to give 2 units of blood for bilateral total knee arthroplasties, for one knee we institute a normogram which may include administration of preoperative Procrit. The operative procedure takes probably 1-2 hours. The operation requires a straight incision anywhere from 5-7 inches down from the knee. Post-operatively the patient will be walking the day of surgery. The first couple days are very painful and the pain medication will alleviate, but not eliminate the pain. The patient must really push hard to get range of motion. Our goal for having a person go home as that the range of motion is approximately 0-90° of flexion and that they can walk with a walker or cane. A walking aid is to be dispensed once the patient is secure enough. In general there, there is no cast or brace required with routine knee replacement. In the long term, we do not encourage our patients to run for the sake of running, although pending their preoperative status, we often allow patient to play doubles tennis or comparative activities. We also allowed them to do gentle intermediate downhill skiing if they are truly an expert skier. Biking is encouraged as well swimming. The followup periods are usually 3 weeks, 6 weeks, 3 months, and yearly intervals. Potential complications with total knee replacement included anesthetic complications and death, infection around 1%, nerve damage, by which means peroneal nerve palsy, footdrop or flapping foot with ambulation. This is particularly more apt to occur in the patient with a valgus or knock-knee deformity. The incidence can be quite high in this particular patient population. There will be areas of skin numbness, but this is not an untoward effect nor do we consider it a complication. Other potential complications include dislocation of the patella component, usually less than 2%; loosening of the tibial or femoral component is much more infrequent. Most often this occurs with infection or long-term use. Patient of extreme risk including markedly overweight patient's may be more prone to prosthetic wear. Major blood vessel damage is also extremely rare. Directly because of the anatomic proximity of the popliteal artery this could be lacerated with subsequent repair required. Be it unlikely, disruption of the popliteal artery could theoretically result in amputation. Similarly, infection could theoretically result in amputation if one were to grow out of an organism that cannot be controlled with antibiotics. General medical complications include phlebitis, for which we would prophylactically anticoagulate patients, but could still occur, and fatal pulmonary embolus which has been reported. Cardiovascular problems, such as heart attack or ischemia are always a concern with such hemodynamic changes in the blood vascular system. Other General complications are rare, but anything medicine could theoretically happen. I think the patient understands the risk benefit ratio of total knee replacement and will think about whether there like to pursue with an operation or nonoperative treatment program. I reviewed the plan of care as well as a model of a total knee implant equivalent to the one that will be used for their total knee joint replacement. The patient agreed to the plan of care as well as the use of implants for their knee total joint replacement.

## 2020-01-09 ENCOUNTER — APPOINTMENT (OUTPATIENT)
Dept: ENDOCRINOLOGY | Facility: CLINIC | Age: 80
End: 2020-01-09
Payer: MEDICARE

## 2020-01-09 VITALS
OXYGEN SATURATION: 97 % | HEART RATE: 68 BPM | BODY MASS INDEX: 45.32 KG/M2 | DIASTOLIC BLOOD PRESSURE: 70 MMHG | WEIGHT: 272 LBS | RESPIRATION RATE: 22 BRPM | HEIGHT: 65 IN | SYSTOLIC BLOOD PRESSURE: 132 MMHG

## 2020-01-09 PROCEDURE — 99214 OFFICE O/P EST MOD 30 MIN: CPT

## 2020-01-09 NOTE — HISTORY OF PRESENT ILLNESS
[FreeTextEntry1] : followup for DM2  management and NTMNG and hypoT \par Dm2  for 10 years . Takes oral agents.

## 2020-01-09 NOTE — PHYSICAL EXAM
[Alert] : alert [Well Nourished] : well nourished [No Acute Distress] : no acute distress [EOMI] : extra ocular movement intact [Normal Sclera/Conjunctiva] : normal sclera/conjunctiva [Well Developed] : well developed [Normal Oropharynx] : the oropharynx was normal [No Proptosis] : no proptosis [No Respiratory Distress] : no respiratory distress [Clear to Auscultation] : lungs were clear to auscultation bilaterally [No Accessory Muscle Use] : no accessory muscle use [Normal Rate] : heart rate was normal  [Normal S1, S2] : normal S1 and S2 [Regular Rhythm] : with a regular rhythm [Pedal Pulses Normal] : the pedal pulses are present [Normal Bowel Sounds] : normal bowel sounds [Not Tender] : non-tender [Soft] : abdomen soft [Not Distended] : not distended [Post Cervical Nodes] : posterior cervical nodes [Anterior Cervical Nodes] : anterior cervical nodes [Axillary Nodes] : axillary nodes [Normal] : normal and non tender [No Spinal Tenderness] : no spinal tenderness [Spine Straight] : spine straight [No Stigmata of Cushings Syndrome] : no stigmata of cushings syndrome [Normal Gait] : normal gait [Normal Strength/Tone] : muscle strength and tone were normal [No Rash] : no rash [Normal Reflexes] : deep tendon reflexes were 2+ and symmetric [No Tremors] : no tremors [Oriented x3] : oriented to person, place, and time [Acanthosis Nigricans] : no acanthosis nigricans [de-identified] : thyroid enlarged mildly  [de-identified] : pitting edema B/L R ankle >> L ankle, varicose veins B/L

## 2020-01-09 NOTE — ASSESSMENT
[Carbohydrate Consistent Diet] : carbohydrate consistent diet [Importance of Diet and Exercise] : importance of diet and exercise to improve glycemic control, achieve weight loss and improve cardiovascular health [Diabetes Foot Care] : diabetes foot care [Retinopathy Screening] : Patient was referred to ophthalmology for retinopathy screening [FreeTextEntry1] : Dm2 mildly uncontrolled a1c 7  in patient with morbid obesity and HTN and HLD.  \par \par Dm2: i\par advised to check her Bgs daily but she consistently refuses to \par all lab results reviewed and discussed with patient. All questions answered\par continue metformin 1000 mg BID \par continue glimepiride 2 mg qd \par discussed diet and exercise\par encouraged more exercise walking 30 min 3 x week\par Discussed complications of diabetes at length including MI/CVA/ESRD/dialysis/blindness/amputations/infections\par Needs to try to have more protein for meals\par admits poor diet eating eating carbs \par GFr 45. will monitor. \par cecilia/c ratio normal. \par continue ASA \par eye exam May 2019: reportedly no DR \par bgs 1x day but she does not do it daily. \par \par NTMNG \par s/p FNA LEFT  nodule 1.3 cm nondiagnostic nodule in [facility] 2018.\par Images reviewed and compared to prior study.\par L dominant nodule is stable now \par repeat US in Jan 2020\par Images revised and has rim calcifications that can lead to FNA being nondiagnostic results again. \par Also considering her age , we decided to monitor for now and repeat US in 6 mo.\par \par hypoT : continue LT4 50 mcg \par pt euthyroid clinically and biochemically.\par Take daily in AM on an empty stomach at least 30 minutes before eating or taking other medication.\par \par obesity: discussed diet and exercise\par encouraged more exercise walking 30 min 3 x week\par \par HTN :  bp at target on meds. Continue current management.\par discussed low salt diet \par \par HLD: \par LDL at target.\par continue atorvastatin 20 mg qd \par \par osteoporosis? : check DXA scan \par \par

## 2020-01-22 ENCOUNTER — APPOINTMENT (OUTPATIENT)
Dept: FAMILY MEDICINE | Facility: CLINIC | Age: 80
End: 2020-01-22
Payer: MEDICARE

## 2020-01-22 VITALS
HEART RATE: 62 BPM | SYSTOLIC BLOOD PRESSURE: 130 MMHG | HEIGHT: 65 IN | BODY MASS INDEX: 45.15 KG/M2 | DIASTOLIC BLOOD PRESSURE: 70 MMHG | WEIGHT: 271 LBS

## 2020-01-22 PROCEDURE — 99214 OFFICE O/P EST MOD 30 MIN: CPT

## 2020-01-22 RX ORDER — AZITHROMYCIN 250 MG/1
250 TABLET, FILM COATED ORAL
Qty: 1 | Refills: 6 | Status: DISCONTINUED | COMMUNITY
Start: 2019-10-25 | End: 2020-01-22

## 2020-01-22 RX ORDER — METHOCARBAMOL 500 MG/1
500 TABLET, FILM COATED ORAL 4 TIMES DAILY
Qty: 40 | Refills: 0 | Status: DISCONTINUED | COMMUNITY
Start: 2019-10-02 | End: 2020-01-22

## 2020-01-22 NOTE — HISTORY OF PRESENT ILLNESS
[FreeTextEntry1] : F/U HTN [de-identified] : MS. MERIDA IS A PLEASANT 78 YO PRESENTING FOR FOLLOW-UP. CHRONIC HISTORY OF HYPOTHYROIDISM, DEPRESSION, HYPERTENSION AND HYPERLIPIDEMIA AS WELL AS DIABETES AND THYROID NODULES UNDER THE CARE OF ENDOCRINOLOGY.  RECENTLY HAD STEROID INJECTIONS IN KNEES FROM DR. ALY.  REFERRED TO VASCULAR - TO SEE DR. HUNTER.  HAVING SONOGRAM TOMORROW.  ALSO DISCUSSED POSSIBLE NEUROLOGY EVALUATION DUE TO A "THROBBING" SHE GETS IN HER LEGS.  HAS HAD NO FALLS OR TRAUMA.  STILL NEEDS TO GO FOR BONE DENSITY AND MAMMOGRAM.  HAS NO BREAST COMPLAINTS.  NO PAIN, NIPPLE DISCHARGE, SKIN CHANGES OR LUMPS.  BLOOD PRESSURE CONTROLLED ON CURRENT MEDICATIONS AND TAKING LIPITOR FOR CHOLESTEROL.  FEELING WELL ON LEXAPRO FOR DEPRESSION.  SOCIALIZING AND GETTING ALONG WELL WITH OTHERS.  NO OTHER COMPLAINTS TODAY.

## 2020-01-22 NOTE — PLAN
[FreeTextEntry1] : BLOOD PRESSURE CONTROLLED\par RECENT LAB WORK REVIEWED AND LIPIDS UNDER GOOD CONTROL\par HEALTHY DIET AND LIFESTYLE MODIFICATIONS; HEALTHY WEIGHT LOSS\par DOING WELL ON LEXAPRO - WILL CONTINUE\par FOLLOW-UP ALL SPECIALISTS AS DIRECTED \par ENDOCRINOLOGY CONSULTANT NOTE APPRECIATED\par CALL WITH ANY QUESTIONS, CONCERNS OR CHANGES

## 2020-02-04 ENCOUNTER — APPOINTMENT (OUTPATIENT)
Dept: VASCULAR SURGERY | Facility: CLINIC | Age: 80
End: 2020-02-04
Payer: MEDICARE

## 2020-02-04 VITALS
HEART RATE: 69 BPM | OXYGEN SATURATION: 97 % | HEIGHT: 65 IN | BODY MASS INDEX: 46.15 KG/M2 | TEMPERATURE: 98 F | DIASTOLIC BLOOD PRESSURE: 50 MMHG | SYSTOLIC BLOOD PRESSURE: 160 MMHG | WEIGHT: 277 LBS

## 2020-02-04 DIAGNOSIS — Q82.0 HEREDITARY LYMPHEDEMA: ICD-10-CM

## 2020-02-04 DIAGNOSIS — Z72.89 OTHER PROBLEMS RELATED TO LIFESTYLE: ICD-10-CM

## 2020-02-04 DIAGNOSIS — I87.2 VENOUS INSUFFICIENCY (CHRONIC) (PERIPHERAL): ICD-10-CM

## 2020-02-04 PROCEDURE — 93970 EXTREMITY STUDY: CPT

## 2020-02-04 PROCEDURE — 29580 STRAPPING UNNA BOOT: CPT | Mod: LT

## 2020-02-18 ENCOUNTER — APPOINTMENT (OUTPATIENT)
Dept: VASCULAR SURGERY | Facility: CLINIC | Age: 80
End: 2020-02-18

## 2020-04-17 LAB
ALBUMIN SERPL ELPH-MCNC: 4 G/DL
ALP BLD-CCNC: 86 U/L
ALT SERPL-CCNC: 19 U/L
ANION GAP SERPL CALC-SCNC: 13 MMOL/L
AST SERPL-CCNC: 20 U/L
BASOPHILS # BLD AUTO: 0.08 K/UL
BASOPHILS NFR BLD AUTO: 0.9 %
BILIRUB SERPL-MCNC: 0.4 MG/DL
BUN SERPL-MCNC: 22 MG/DL
CALCIUM SERPL-MCNC: 9.8 MG/DL
CHLORIDE SERPL-SCNC: 103 MMOL/L
CHOLEST SERPL-MCNC: 137 MG/DL
CHOLEST/HDLC SERPL: 3 RATIO
CO2 SERPL-SCNC: 25 MMOL/L
CREAT SERPL-MCNC: 1.13 MG/DL
EOSINOPHIL # BLD AUTO: 0.41 K/UL
EOSINOPHIL NFR BLD AUTO: 4.8 %
ESTIMATED AVERAGE GLUCOSE: 154 MG/DL
GLUCOSE SERPL-MCNC: 166 MG/DL
HBA1C MFR BLD HPLC: 7 %
HCT VFR BLD CALC: 41.8 %
HDLC SERPL-MCNC: 46 MG/DL
HGB BLD-MCNC: 13 G/DL
IMM GRANULOCYTES NFR BLD AUTO: 0.6 %
LDLC SERPL CALC-MCNC: 68 MG/DL
LDLC SERPL DIRECT ASSAY-MCNC: 80 MG/DL
LYMPHOCYTES # BLD AUTO: 2.46 K/UL
LYMPHOCYTES NFR BLD AUTO: 29.1 %
MAN DIFF?: NORMAL
MCHC RBC-ENTMCNC: 28.6 PG
MCHC RBC-ENTMCNC: 31.1 GM/DL
MCV RBC AUTO: 92.1 FL
MONOCYTES # BLD AUTO: 0.61 K/UL
MONOCYTES NFR BLD AUTO: 7.2 %
NEUTROPHILS # BLD AUTO: 4.85 K/UL
NEUTROPHILS NFR BLD AUTO: 57.4 %
PLATELET # BLD AUTO: 318 K/UL
POTASSIUM SERPL-SCNC: 4.7 MMOL/L
PROT SERPL-MCNC: 6.7 G/DL
RBC # BLD: 4.54 M/UL
RBC # FLD: 14.2 %
SODIUM SERPL-SCNC: 141 MMOL/L
TRIGL SERPL-MCNC: 114 MG/DL
WBC # FLD AUTO: 8.46 K/UL

## 2020-04-22 ENCOUNTER — APPOINTMENT (OUTPATIENT)
Dept: ENDOCRINOLOGY | Facility: CLINIC | Age: 80
End: 2020-04-22

## 2020-04-23 ENCOUNTER — APPOINTMENT (OUTPATIENT)
Dept: ENDOCRINOLOGY | Facility: CLINIC | Age: 80
End: 2020-04-23
Payer: MEDICARE

## 2020-04-23 PROCEDURE — 99442: CPT

## 2020-04-28 ENCOUNTER — APPOINTMENT (OUTPATIENT)
Dept: FAMILY MEDICINE | Facility: CLINIC | Age: 80
End: 2020-04-28
Payer: MEDICARE

## 2020-04-28 VITALS
DIASTOLIC BLOOD PRESSURE: 68 MMHG | TEMPERATURE: 97.6 F | HEART RATE: 71 BPM | HEIGHT: 65 IN | BODY MASS INDEX: 46.15 KG/M2 | SYSTOLIC BLOOD PRESSURE: 140 MMHG | WEIGHT: 277 LBS

## 2020-04-28 PROCEDURE — 99214 OFFICE O/P EST MOD 30 MIN: CPT

## 2020-04-28 RX ORDER — GLIMEPIRIDE 2 MG/1
2 TABLET ORAL
Qty: 90 | Refills: 0 | Status: DISCONTINUED | COMMUNITY
Start: 2019-08-30 | End: 2020-04-28

## 2020-04-28 NOTE — REVIEW OF SYSTEMS
[Hearing Loss] : hearing loss [Depression] : depression [Negative] : Neurological [Suicidal] : not suicidal

## 2020-04-28 NOTE — HISTORY OF PRESENT ILLNESS
[FreeTextEntry1] : F/U DEPRESSION [de-identified] : MS. MERIDA IS A PLEASANT 80 YO.  HISTORY OF DEPRESSION.  WOULD LIKE TO STOP LEXAPRO.  WOULD CONSIDER ALTERNATE MEDICATION AFTER SHE COMES OFF OF IT DEPENDING ON HOW SHE FEELS.  LIVES WITH  AND FAMILY LOCAL.  ENJOYS SPENDING TIME WITH CHILDREN.  NO SUICIDAL/HOMICIDAL IDEATIONS OR PLANS.  ALSO HISTORY OF HYPERTENSION ON METOPROLOL AND LIPITOR FOR CHOLESTEROL.  SAW VASCULAR IN FOLLOW-UP OF SWELLING IN LEGS.  PRESCRIBED COMPRESSION PUMPS WHICH SHE STARTED TO USE LAST WEEK.

## 2020-04-28 NOTE — PHYSICAL EXAM
[No Acute Distress] : no acute distress [Well Nourished] : well nourished [Well-Appearing] : well-appearing [Normal Sclera/Conjunctiva] : normal sclera/conjunctiva [PERRL] : pupils equal round and reactive to light [No Lymphadenopathy] : no lymphadenopathy [Supple] : supple [No Respiratory Distress] : no respiratory distress  [No Accessory Muscle Use] : no accessory muscle use [Clear to Auscultation] : lungs were clear to auscultation bilaterally [Normal Rate] : normal rate  [Regular Rhythm] : with a regular rhythm [Normal S1, S2] : normal S1 and S2 [Grossly Normal Strength/Tone] : grossly normal strength/tone [Speech Grossly Normal] : speech grossly normal [Memory Grossly Normal] : memory grossly normal [Normal Affect] : the affect was normal [de-identified] : LEFT > RIGHT LEG SWELLING

## 2020-04-28 NOTE — PLAN
[FreeTextEntry1] : SLOW TAPER TO LEXAPRO 10 MG.  TO CALL WITH ANY CONCERNS SOONER OTHERWISE RE-DISCUSS IN ONE MONTH.  MAR WILL CONSIDER ALTERNATE MEDICATION IF NOT FEELING WELL POST TAPER\par STRESS REDUCTION\par MONITOR BLOOD PRESSURE\par HEALTHY DIET AND LIFESTYLE MODIFICATIONS; HEALTHY WEIGHT LOSS\par MONITOR LAB WORK - MOST RECENT REVIEWED\par RX FOR MAMMOGRAM AND BONE DENSITY\par FOLLOW-UP ALL SPECIALISTS AS DIRECTED \par CALL WITH ANY QUESTIONS, CONCERNS OR CHANGES

## 2020-05-04 ENCOUNTER — RX RENEWAL (OUTPATIENT)
Age: 80
End: 2020-05-04

## 2020-05-21 ENCOUNTER — APPOINTMENT (OUTPATIENT)
Dept: ORTHOPEDIC SURGERY | Facility: CLINIC | Age: 80
End: 2020-05-21
Payer: MEDICARE

## 2020-05-21 VITALS
DIASTOLIC BLOOD PRESSURE: 81 MMHG | HEART RATE: 64 BPM | SYSTOLIC BLOOD PRESSURE: 165 MMHG | BODY MASS INDEX: 45.82 KG/M2 | WEIGHT: 275 LBS | HEIGHT: 65 IN

## 2020-05-21 VITALS — TEMPERATURE: 98.5 F

## 2020-05-21 PROCEDURE — 99214 OFFICE O/P EST MOD 30 MIN: CPT | Mod: 25

## 2020-05-21 PROCEDURE — 20610 DRAIN/INJ JOINT/BURSA W/O US: CPT | Mod: 50

## 2020-05-21 NOTE — DISCUSSION/SUMMARY
[de-identified] : Surgical risks reviewed. 79 year old female with with end-stage medial compartment osteoarthritis of the left knee. She is a candidate for left TKA, but wishes to continue with conservative treatment. Pt does have MHx of DM we will need to F/U with HbA1c should she wish to pursue surgery in the future. She elected to receive a cortisone injection in her left knee which she tolerated well.\par She is also s/p right TKA DOS 4/7/2015 with pes bursitis of the right knee. She has responded well to right knee pes bursa cortisone injections in the past and she elected to receive repeat injection today. \par For her back pain, I provided her with a prescription for Flexeril. Should her back pain persist I will provide her with a referral to physiatry for evaluation and treatment of her back pain. F/U 3 months for repeat cortisone injections if needed or to discuss left TKA. \par \par A knee replacement means resurfacing of all 3 surfaces of the patella, the femur, and tibia with metal and plastic parts. The prosthetic parts are usually cemented into position and well outpatient range of motion from full extension to about 120° of flexion. The postoperative motion, however, is determined by multiple factors, the most important of which is preoperative motion. In general, the better motion preoperatively, the better the motion postoperatively. The operation, pending medical clearance, gently requires hospitalization of 3-4 days for one knee, 5-7 days for bilateral knee replacements. In general, we prefer to perform the procedure under spinal anesthesia with femoral nerve block and occasional single shot sciatic nerve block. We may ask a patient to give 2 units of blood for bilateral total knee arthroplasties, for one knee we institute a normogram which may include administration of preoperative Procrit. The operative procedure takes probably 1-2 hours. The operation requires a straight incision anywhere from 5-7 inches down from the knee. Postoperatively, the patient is positioned in a continuous passive motion machine within the first 24 hours and is walking the day after surgery. The first couple days are very painful and the pain medication will alleviate, but not eliminate the pain. The patient must really push hard to get range of motion. Our goal for having a person go home as that the range of motion is approximately 0-90° of flexion and that they can walk with a walker or cane. A walking aid is to be dispensed once the patient is secure enough. In general there, there is no cast or brace required with routine knee replacement. In the long term, we do not encourage our patients to run for the sake of running, although pending their preoperative status, we often allow patient to play doubles tennis or comparative activities. We also allowed them to do gentle intermediate downhill skiing if they are truly an expert skier. Biking is encouraged as well swimming. The followup periods are usually 3 weeks, 6 weeks, 3 months, and yearly intervals. Potential complications with total knee replacement included anesthetic complications and death, infection around 1%, nerve damage, by which means peroneal nerve palsy, footdrop or flapping foot with ambulation. This is particularly more apt to occur in the patient with a valgus or knock-knee deformity. The incidence can be quite high in this particular patient population. There will be areas of skin numbness, but this is not an untoward effect nor do we consider it a complication. Other potential complications include dislocation of the patella component, usually less than 2%; loosening of the tibial or femoral component is much more infrequent. Most often this occurs with infection or long-term use. Patient of extreme risk including markedly overweight patient's may be more prone to prosthetic wear. Major blood vessel damage is also extremely rare. Directly because of the anatomic proximity of the popliteal artery this could be lacerated with subsequent repair required. Be it unlikely, disruption of the popliteal artery could theoretically result in amputation. Similarly, infection could theoretically result in amputation if one were to grow out of an organism that cannot be controlled with antibiotics. General medical complications include phlebitis, for which we would prophylactically anticoagulate patients, but could still occur, and fatal pulmonary embolus which has been reported. Cardiovascular problems, such as heart attack or ischemia are always a concern with such hemodynamic changes in the blood vascular system. Other General complications are rare, but anything medicine could theoretically happen. I think the patient understands the risk benefit ratio of total knee replacement and will think about whether there like to pursue with an operation or nonoperative treatment program. I reviewed the plan of care as well as a model of a total knee implant equivalent to the one that will be used for their total knee joint replacement. The patient agreed to the plan of care as well as the use of implants for their knee total joint replacement. \par

## 2020-05-21 NOTE — HISTORY OF PRESENT ILLNESS
[Pain Location] : pain [Worsening] : worsening [___ yrs] : [unfilled] year(s) ago [7] : a current pain level of 7/10 [8] : a maximum pain level of 8/10 [Standing] : standing [Constant] : ~He/She~ states the symptoms seem to be constant [Knee Flexion] : worsened with knee flexion [Walking] : worsened by walking [Knee Extension] : worsened with knee extension [de-identified] : 79 year old obese female here for f/u of bilateral knee pain.\par She is s/p right TKA DOS: 4/7/2015.\par Patient had cortisone injection to the left knee intraarticular and right knee pes bursa 1/2020 with good relief.\par \par she c/o severe left knee pain and instability. the pain is constant , she is diabetic.\par pt had some concern with B/L LE neuropathy and referred to neurology but she did not go.\par she was seen by vascular and using Venodyne boots 1 hour a day. \par \par

## 2020-05-21 NOTE — PHYSICAL EXAM
[de-identified] : GENERAL APPEARANCE: Well nourished and hydrated, pleasant, alert, and oriented x 3. Appears their stated age. \par HEENT: Normocephalic, extraocular eye motion intact. Nasal septum midline. Oral cavity clear. External auditory canal clear. \par RESPIRATORY: Breath sounds clear and audible in all lobes. No wheezing, No accessory muscle use.\par CARDIOVASCULAR: No apparent abnormalities. No lower leg edema. No varicosities. Pedal pulses are palpable.\par NEUROLOGIC: Sensation is normal, no muscle weakness in the upper or lower extremities.\par DERMATOLOGIC: No apparent skin lesions, moist, warm, no rash.\par SPINE: Cervical spine appears normal and moves freely; thoracic spine appears normal and moves freely; lumbosacral spine appears normal and moves freely, normal, nontender.\par MUSCULOSKELETAL: Hands, wrists, and elbows are normal and move freely, shoulders are normal and move freely. \par Musculoskeletal: Gait: normal and not antalgic . Examination of right knee demonstrates healed midline incision, no obvious signs of infection, no joint effusion, no erythema, smooth and active full range of motion without pain 0- 120 degrees, tenderness over the pes anserine bursa. \par Left knee examination shows varus alignment, medial joint line tenderness and pain with palpation, crepitus with range of motion. \par  \par 5/5 motor strength in bilateral lower extremities. Sensory: Intact in bilateral lower extremities. DTRs: Biceps, brachioradialis, triceps, patellar, ankle and plantar 2+ and symmetric bilaterally. Pulses: dorsalis pedis, posterior tibial, femoral, popliteal, and radial 2+ and symmetric bilaterally. \par Constitutional: Alert and in no acute distress and obese, but well-appearing and not in acute distress.

## 2020-05-21 NOTE — PROCEDURE
[de-identified] : I injected the patient's left knee today with cortisone.\par \par I discussed at length with the patient the planned steroid and lidocaine injection. The risks, benefits, convalescence and alternatives were reviewed. The possible side effects discussed included but were not limited to: pain, swelling, heat, bleeding, and redness. Symptoms are generally mild but if they are extensive then contact the office. Giving pain relievers by mouth such as NSAIDs or Tylenol can generally treat the reactions to steroid and lidocaine. Rare cases of infection have been noted. Rash, hives and itching may occur post injection. If you have muscle pain or cramps, flushing and or swelling of the face, rapid heart beat, nausea, dizziness, fever, chills, headache, difficulty breathing, swelling in the arms or legs, or have a prickly feeling of your skin, contact a health care provider immediately. Following this discussion, the knee was prepped with Alcohol and under sterile condition the 80 mg Depo-Medrol and 6 cc Lidocaine injection was performed with a 20 gauge needle through a superolateral injection site. The needle was introduced into the joint, aspiration was performed to ensure intra-articular placement and the medication was injected. Upon withdrawal of the needle the site was cleaned with alcohol and a band aid applied. The patient tolerated the injection well and there were no adverse effects. Post injection instructions included no strenuous activity for 24 hours, cryotherapy and if there are any adverse effects to contact the office. \par \par \par  I injected the patient's right pes bursa today with cortisone.\par \par I discussed at length with the patient the planned steroid and lidocaine injection. The risks, benefits, convalescence and alternatives were reviewed. The possible side effects discussed included but were not limited to: pain, swelling, heat, bleeding, and redness. Symptoms are generally mild but if they are extensive then contact the office. Giving pain relievers by mouth such as NSAIDs or Tylenol can generally treat the reactions to steroid and lidocaine. Rare cases of infection have been noted. Rash, hives and itching may occur post injection. If you have muscle pain or cramps, flushing and or swelling of the face, rapid heart beat, nausea, dizziness, fever, chills, headache, difficulty breathing, swelling in the arms or legs, or have a prickly feeling of your skin, contact a health care provider immediately. Following this discussion, the knee was prepped with Alcohol and under sterile condition the 80 mg Depo-Medrol and 6 cc Lidocaine injection was performed with a 20 gauge needle through a bursa injection site. The needle was introduced into the pes bursa aspiration was performed to ensure bursal placement and the medication was injected. Upon withdrawal of the needle the site was cleaned with alcohol and a band aid applied. The patient tolerated the injection well and there were no adverse effects. Post injection instructions included no strenuous activity for 24 hours, cryotherapy and if there are any adverse effects to contact the office. \par \par

## 2020-07-23 ENCOUNTER — APPOINTMENT (OUTPATIENT)
Dept: FAMILY MEDICINE | Facility: CLINIC | Age: 80
End: 2020-07-23
Payer: MEDICARE

## 2020-07-23 VITALS
HEIGHT: 65 IN | WEIGHT: 277 LBS | DIASTOLIC BLOOD PRESSURE: 68 MMHG | HEART RATE: 77 BPM | BODY MASS INDEX: 46.15 KG/M2 | SYSTOLIC BLOOD PRESSURE: 128 MMHG

## 2020-07-23 PROCEDURE — 99214 OFFICE O/P EST MOD 30 MIN: CPT

## 2020-07-23 NOTE — ASSESSMENT
[FreeTextEntry1] : FLU VACCINE IN THE FALL\par HAS CONSIDERED SHINGRIX AND TDAP\par HEALTHY DIET AND LIFESTYLE MODIFICATIONS; HEALTHY WEIGHT LOSS \par SODIUM AVOIDANCE\par BLOOD PRESSURE CONTROLLED\par MOST RECENT LAB WORK REVIEWED; LIPIDS UNDER GOOD CONTROLLED\par CONTINUE CURRENT MEDICATIONS\par FOLLOW-UP ALL SPECIALISTS AS DIRECTED \par 6 MONTH REPEAT MAMMOGRAM\par CALL WITH ANY QUESTIONS, CONCERNS OR CHANGES

## 2020-07-23 NOTE — HISTORY OF PRESENT ILLNESS
[FreeTextEntry1] : PRESCRIPTIONS [de-identified] : MS. MERIDA IS A PLEASANT 80 YO PRESENTING FOR FOLLOW-UP.  HAS BEEN GETTING INJECTIONS IN BILATERAL KNEES WITH ORTHOPEDICS DR. ALY.  RELIEVES PAIN FOR A COUPLE MONTHS.  HAS DISCUSSED SURGICAL INTERVENTION FOR OSTEOARTHRITIS BUT IS NOT INTERESTED IN PURSUING AT THIS TIME.  ALSO SAW VASCULAR DR. HUNTER FOR SWELLING IN LEGS.  HAD IMAGING AND GIVEN BOOT TO WEAR FOR AN HOUR DAILY - SOMETIMES HELPS.  DUE TO FOLLOW-UP.  LEGS ARE "GOOD" TODAY.  WILL BE SEEING CARDIOLOGY TOMORROW AND ENDOCRINOLOGY THE BEGINNING OF AUGUST.  IS HAVING LAB WORK NEXT WEEK.  CHORNIC HISTORY OF DEPRESSION.  INITIALLY WAS GOING TO STOP LEXAPRO BUT HAS FOUND THAT IT IS HELPING AND HAS DECIDED TO CONTINUE TO TAKE.  ON LIPITOR FOR CHOLESTEROL.  CHRONIC HISTORY OF HYPERTENSION - BLOOD PRESSURE CONTROLLED.  WILL HAVE FLU VACCINE IN THE FALL.

## 2020-07-23 NOTE — REVIEW OF SYSTEMS
[Lower Ext Edema] : lower extremity edema [Negative] : Gastrointestinal [Chest Pain] : no chest pain [Suicidal] : not suicidal [Palpitations] : no palpitations

## 2020-07-24 ENCOUNTER — APPOINTMENT (OUTPATIENT)
Dept: CARDIOLOGY | Facility: CLINIC | Age: 80
End: 2020-07-24
Payer: MEDICARE

## 2020-07-24 VITALS
RESPIRATION RATE: 20 BRPM | BODY MASS INDEX: 46.32 KG/M2 | WEIGHT: 278 LBS | OXYGEN SATURATION: 94 % | DIASTOLIC BLOOD PRESSURE: 68 MMHG | HEART RATE: 66 BPM | SYSTOLIC BLOOD PRESSURE: 144 MMHG | HEIGHT: 65 IN

## 2020-07-24 DIAGNOSIS — M79.89 OTHER SPECIFIED SOFT TISSUE DISORDERS: ICD-10-CM

## 2020-07-24 DIAGNOSIS — M79.606 PAIN IN LEG, UNSPECIFIED: ICD-10-CM

## 2020-07-24 PROCEDURE — 99214 OFFICE O/P EST MOD 30 MIN: CPT

## 2020-07-24 PROCEDURE — 93000 ELECTROCARDIOGRAM COMPLETE: CPT

## 2020-07-27 ENCOUNTER — RX RENEWAL (OUTPATIENT)
Age: 80
End: 2020-07-27

## 2020-07-29 ENCOUNTER — RX RENEWAL (OUTPATIENT)
Age: 80
End: 2020-07-29

## 2020-07-30 LAB
ALBUMIN SERPL ELPH-MCNC: 4.3 G/DL
ALP BLD-CCNC: 97 U/L
ALT SERPL-CCNC: 21 U/L
ANION GAP SERPL CALC-SCNC: 15 MMOL/L
AST SERPL-CCNC: 23 U/L
BASOPHILS # BLD AUTO: 0.16 K/UL
BASOPHILS NFR BLD AUTO: 1.6 %
BILIRUB SERPL-MCNC: 0.4 MG/DL
BUN SERPL-MCNC: 32 MG/DL
CALCIUM SERPL-MCNC: 10.5 MG/DL
CHLORIDE SERPL-SCNC: 96 MMOL/L
CHOLEST SERPL-MCNC: 143 MG/DL
CHOLEST/HDLC SERPL: 3.3 RATIO
CO2 SERPL-SCNC: 28 MMOL/L
CREAT SERPL-MCNC: 1.38 MG/DL
EOSINOPHIL # BLD AUTO: 0.36 K/UL
EOSINOPHIL NFR BLD AUTO: 3.6 %
GLUCOSE SERPL-MCNC: 187 MG/DL
HCT VFR BLD CALC: 44.1 %
HDLC SERPL-MCNC: 43 MG/DL
HGB BLD-MCNC: 14 G/DL
IMM GRANULOCYTES NFR BLD AUTO: 0.3 %
LDLC SERPL CALC-MCNC: 61 MG/DL
LDLC SERPL DIRECT ASSAY-MCNC: 77 MG/DL
LYMPHOCYTES # BLD AUTO: 3.43 K/UL
LYMPHOCYTES NFR BLD AUTO: 33.9 %
MAN DIFF?: NORMAL
MCHC RBC-ENTMCNC: 29.7 PG
MCHC RBC-ENTMCNC: 31.7 GM/DL
MCV RBC AUTO: 93.4 FL
MONOCYTES # BLD AUTO: 0.79 K/UL
MONOCYTES NFR BLD AUTO: 7.8 %
NEUTROPHILS # BLD AUTO: 5.34 K/UL
NEUTROPHILS NFR BLD AUTO: 52.8 %
PLATELET # BLD AUTO: 390 K/UL
POTASSIUM SERPL-SCNC: 4.3 MMOL/L
PROT SERPL-MCNC: 7.2 G/DL
RBC # BLD: 4.72 M/UL
RBC # FLD: 13.5 %
SODIUM SERPL-SCNC: 139 MMOL/L
TRIGL SERPL-MCNC: 191 MG/DL
WBC # FLD AUTO: 10.11 K/UL

## 2020-07-31 LAB
ANION GAP SERPL CALC-SCNC: 17 MMOL/L
BUN SERPL-MCNC: 31 MG/DL
CALCIUM SERPL-MCNC: 10.5 MG/DL
CHLORIDE SERPL-SCNC: 95 MMOL/L
CO2 SERPL-SCNC: 27 MMOL/L
CREAT SERPL-MCNC: 1.4 MG/DL
GLUCOSE SERPL-MCNC: 184 MG/DL
POTASSIUM SERPL-SCNC: 4.4 MMOL/L
SODIUM SERPL-SCNC: 139 MMOL/L

## 2020-08-03 ENCOUNTER — APPOINTMENT (OUTPATIENT)
Dept: ENDOCRINOLOGY | Facility: CLINIC | Age: 80
End: 2020-08-03
Payer: MEDICARE

## 2020-08-03 VITALS
HEIGHT: 65 IN | SYSTOLIC BLOOD PRESSURE: 120 MMHG | DIASTOLIC BLOOD PRESSURE: 70 MMHG | BODY MASS INDEX: 46.65 KG/M2 | WEIGHT: 280 LBS

## 2020-08-03 LAB
CREAT SPEC-SCNC: 166 MG/DL
MICROALBUMIN 24H UR DL<=1MG/L-MCNC: 1.7 MG/DL
MICROALBUMIN/CREAT 24H UR-RTO: 10 MG/G

## 2020-08-03 PROCEDURE — 99214 OFFICE O/P EST MOD 30 MIN: CPT

## 2020-08-03 NOTE — ASSESSMENT
[Retinopathy Screening] : Patient was referred to ophthalmology for retinopathy screening [Exercise/Effect on Glucose] : exercise/effect on glucose [Importance of Diet and Exercise] : importance of diet and exercise to improve glycemic control, achieve weight loss and improve cardiovascular health [Weight Loss] : weight loss [FreeTextEntry1] : Dm2 mildly uncontrolled a1c 7  in patient with morbid obesity and HTN and HLD.  \par \par Dm2:  she declines checking any bgs. She does not exercise or swimm. \par Encouraged to do some walking or even better swimming since she has knee arthritis. \par Advised to cut down pastas but she declines to make any changes in her diet \par all lab results reviewed and discussed with patient. All questions answered\par continue metformin 1000 mg BID \par continue glimepiride 2 mg qd \par Discussed complications of diabetes at length including MI/CVA/ESRD/dialysis/blindness/amputations/infections\par Needs to try to have more protein for meals\par GFr slightly lower 37. Will monitor.  \par foot exam normal today: normal monofilament and vibratory B/L\par \par cecilia/c ratio normal. \par continue ASA \par eye exam referral  \par \par NTMNG \par s/p FNA LEFT  nodule 1.3 cm nondiagnostic nodule in [facility] 2018.\par Images reviewed and compared to prior study.\par L dominant nodule is stable now \par repeat US in Jan 2020\par Images revised and has rim calcifications that can lead to FNA being nondiagnostic results again. \par Also considering her age , we decided to monitor for now and repeat US in 6 mo.\par referral to nephrology for monitoring and see if we can add ACEI / ARB \par \par hypoT : continue LT4 50 mcg \par Take daily in AM on an empty stomach at least 30 minutes before eating or taking other medication.\par \par obesity: encouraged more exercise walking 30 min 3 x week\par \par HTN :  bp at target on meds. Continue current management.\par I advised low fat/low cholesterol diet, low salt diet, and weight loss\par \par HLD: LDL at target.\par low fat/low cholesterol diet and weight loss advised\par continue atorvastatin 20 mg qd \par \par osteoporosis: she ahd DXA scan with normal BMD and increase bone mass and i suspect that's due to arthritic changes and reading are not reflecting  reality. No previous fractures.\par continue vitamin d  \par continue MVi daily \par \par

## 2020-08-03 NOTE — PHYSICAL EXAM
[Alert] : alert [Well Nourished] : well nourished [No Acute Distress] : no acute distress [Normal Sclera/Conjunctiva] : normal sclera/conjunctiva [Well Developed] : well developed [EOMI] : extra ocular movement intact [Normal Oropharynx] : the oropharynx was normal [No Proptosis] : no proptosis [No Thyroid Nodules] : no palpable thyroid nodules [Thyroid Not Enlarged] : the thyroid was not enlarged [No Respiratory Distress] : no respiratory distress [No Accessory Muscle Use] : no accessory muscle use [Normal S1, S2] : normal S1 and S2 [Clear to Auscultation] : lungs were clear to auscultation bilaterally [Normal Rate] : heart rate was normal [Regular Rhythm] : with a regular rhythm [Pedal Pulses Normal] : the pedal pulses are present [No Edema] : no peripheral edema [Normal Bowel Sounds] : normal bowel sounds [Not Tender] : non-tender [Soft] : abdomen soft [Not Distended] : not distended [Normal Posterior Cervical Nodes] : no posterior cervical lymphadenopathy [Normal Anterior Cervical Nodes] : no anterior cervical lymphadenopathy [Spine Straight] : spine straight [Normal Gait] : normal gait [No Rash] : no rash [Oriented x3] : oriented to person, place, and time [No Tremors] : no tremors [Acanthosis Nigricans] : no acanthosis nigricans [Normal] : normal [Diminished Throughout Both Feet] : normal tactile sensation with monofilament testing throughout both feet [Full ROM] : with full range of motion

## 2020-08-24 ENCOUNTER — APPOINTMENT (OUTPATIENT)
Dept: CARDIOLOGY | Facility: CLINIC | Age: 80
End: 2020-08-24
Payer: MEDICARE

## 2020-08-24 PROCEDURE — 93306 TTE W/DOPPLER COMPLETE: CPT

## 2020-08-24 PROCEDURE — 93970 EXTREMITY STUDY: CPT

## 2020-08-26 ENCOUNTER — APPOINTMENT (OUTPATIENT)
Dept: ORTHOPEDIC SURGERY | Facility: CLINIC | Age: 80
End: 2020-08-26
Payer: MEDICARE

## 2020-08-26 VITALS — HEART RATE: 63 BPM | SYSTOLIC BLOOD PRESSURE: 145 MMHG | DIASTOLIC BLOOD PRESSURE: 91 MMHG

## 2020-08-26 PROCEDURE — 99213 OFFICE O/P EST LOW 20 MIN: CPT | Mod: 25

## 2020-08-26 PROCEDURE — 20610 DRAIN/INJ JOINT/BURSA W/O US: CPT | Mod: 50

## 2020-08-26 NOTE — END OF VISIT
[FreeTextEntry3] : I, Daryl Merlos, acted solely as a scribe for Dr. Nicholas Davidson on this date 08/26/2020.

## 2020-08-26 NOTE — PHYSICAL EXAM
[Antalgic] : not antalgic [Apractic] : not apractic [Ataxic] : not ataxic [Coxalgic] : not coxalgic [UE/LE] : Sensory: Intact in bilateral upper & lower extremities [de-identified] : GENERAL APPEARANCE: Well nourished and hydrated, pleasant, alert, and oriented x 3. Appears their stated age. \par HEENT: Normocephalic, extraocular eye motion intact. Nasal septum midline. Oral cavity clear. External auditory canal clear. \par RESPIRATORY: Breath sounds clear and audible in all lobes. No wheezing, No accessory muscle use.\par CARDIOVASCULAR: No apparent abnormalities. No lower leg edema. No varicosities. Pedal pulses are palpable.\par NEUROLOGIC: Sensation is normal, no muscle weakness in the upper or lower extremities.\par DERMATOLOGIC: No apparent skin lesions, moist, warm, no rash.\par SPINE: Cervical spine appears normal and moves freely; thoracic spine appears normal and moves freely; lumbosacral spine appears normal and moves freely, normal, nontender.\par MUSCULOSKELETAL: Hands, wrists, and elbows are normal and move freely, shoulders are normal and move freely. \par Musculoskeletal: Gait: normal and not antalgic . Examination of right knee demonstrates healed midline incision, no obvious signs of infection, no joint effusion, no erythema, smooth and active full range of motion without pain 0- 120 degrees, tenderness over the pes anserine bursa. \par Left knee examination shows varus alignment, medial joint line tenderness and pain with palpation, crepitus with range of motion. \par  \par 5/5 motor strength in bilateral lower extremities. Sensory: Intact in bilateral lower extremities. DTRs: Biceps, brachioradialis, triceps, patellar, ankle and plantar 2+ and symmetric bilaterally. Pulses: dorsalis pedis, posterior tibial, femoral, popliteal, and radial 2+ and symmetric bilaterally. \par Constitutional: Alert and in no acute distress and obese, but well-appearing and not in acute distress. \par  [ALL] : dorsalis pedis, posterior tibial, femoral, popliteal, and radial 2+ and symmetric bilaterally

## 2020-08-26 NOTE — HISTORY OF PRESENT ILLNESS
[Pain Location] : pain [Worsening] : worsening [___ yrs] : [unfilled] year(s) ago [7] : a current pain level of 7/10 [8] : a maximum pain level of 8/10 [Standing] : standing [Constant] : ~He/She~ states the symptoms seem to be constant [Walking] : worsened by walking [Knee Flexion] : worsened with knee flexion [Knee Extension] : worsened with knee extension [de-identified] : 79 year old obese female here for f/u of bilateral knee pain.\par She is s/p right TKA DOS: 4/7/2015.\par Patient had cortisone injection to the left knee intraarticular and right knee pes bursa May/2020 with good relief.\par \par she c/o severe left knee pain and instability. the pain is constant , she is diabetic.\par pt had some concern with B/L LE neuropathy and referred to neurology but she did not go.\par she was seen by vascular and using Venodyne boots 1 hour a day.

## 2020-08-26 NOTE — DISCUSSION/SUMMARY
[de-identified] : Surgical risks reviewed. 79 year old female with with end-stage medial compartment osteoarthritis of the left knee. She is a candidate for left TKA, but wishes to continue with conservative treatment. Pt does have MHx of DM we will need to F/U with HbA1c should she wish to pursue surgery in the future. She elected to receive a cortisone injection in her left knee which she tolerated well.\par She is also s/p right TKA DOS 4/7/2015 with pes bursitis of the right knee. She has responded well to right knee pes bursa cortisone injections in the past and she elected to receive repeat injection today. \par Should her back pain persist I will provide her with a referral to physiatry for evaluation and treatment of her back pain. F/U 3 months for repeat cortisone injections if needed or to discuss left TKA. \par \par The patient is a 79 year individual with end stage arthritis of their left knee joint. Based upon the patient's continued symptoms and failure to respond to conservative treatment I have recommended a left total knee arthroplasty for this patient. A long discussion took place with the patient describing what a total joint replacement is and what the procedure would entail. A total knee arthroplasty model, similar to the implant that was used during the operation, was utilized to demonstrate and to discuss the various bearing surfaces of the implants. The hospitalization and post-operative care and rehabilitation were also discussed. The use of perioperative antibiotics and DVT prophylaxis were discussed. The risk, benefits and alternatives to a surgical intervention were discussed at length with the patient. The patient was also advised of risks related to the medical comorbidities, elevated body mass index (BMI), and smoking where applicable. We discussed how to reduce modifiable risk factors and encouraged smoking cessation were applicable.. A lengthy discussion took place to review the most common complications including but not limited to: deep vein thrombosis, pulmonary embolus, heart attack, stroke, infection, wound breakdown, numbness, damage to nerves, tendon, muscles, arteries or other blood vessels, death and other possible complications from anesthesia. The patient was told that we will take steps to minimize these risks by using sterile technique, antibiotics and DVT prophylaxis when appropriate and follow the patient postoperatively in the office setting to monitor progress. The possibility of recurrent pain, no improvement in pain and actual worsening of pain were also discussed with the patient.\par The discharge plan of care focused on the patient going home following surgery. The patient was encouraged to make the necessary arrangements to have someone stay with them when they are discharged home. Following discharge, a home care nurse was to the patient. The home care nurse would open the patient’s home care case and request home physical therapy services. Home physical therapy was to commence following discharge provided it was appropriate and covered by the health insurance benefit plan. \par The benefits of surgery were discussed with the patient including the potential for improving her current clinical condition through operative intervention. Alternatives to surgical intervention including continued conservative management were also discussed in detail. All questions were answered to the satisfaction of the patient. The treatment plan of care, as well as a model of a total knee arthroplasty equivalent to the one that will be used for their total joint replacement, was shared with the patient. The patient agreed to the plan of care as well as the use of implants in their total joint replacement.

## 2020-08-26 NOTE — PROCEDURE
[de-identified] : I injected the patient's left knee today with cortisone.\par \par I discussed at length with the patient the planned steroid and lidocaine injection. The risks, benefits, convalescence and alternatives were reviewed. The possible side effects discussed included but were not limited to: pain, swelling, heat, bleeding, and redness. Symptoms are generally mild but if they are extensive then contact the office. Giving pain relievers by mouth such as NSAIDs or Tylenol can generally treat the reactions to steroid and lidocaine. Rare cases of infection have been noted. Rash, hives and itching may occur post injection. If you have muscle pain or cramps, flushing and or swelling of the face, rapid heart beat, nausea, dizziness, fever, chills, headache, difficulty breathing, swelling in the arms or legs, or have a prickly feeling of your skin, contact a health care provider immediately. Following this discussion, the knee was prepped with Alcohol and under sterile condition the 80 mg Depo-Medrol and 6 cc Lidocaine injection was performed with a 20 gauge needle through a superolateral injection site. The needle was introduced into the joint, aspiration was performed to ensure intra-articular placement and the medication was injected. Upon withdrawal of the needle the site was cleaned with alcohol and a band aid applied. The patient tolerated the injection well and there were no adverse effects. Post injection instructions included no strenuous activity for 24 hours, cryotherapy and if there are any adverse effects to contact the office. \par \par \par  I injected the patient's right pes bursa today with cortisone.\par \par I discussed at length with the patient the planned steroid and lidocaine injection. The risks, benefits, convalescence and alternatives were reviewed. The possible side effects discussed included but were not limited to: pain, swelling, heat, bleeding, and redness. Symptoms are generally mild but if they are extensive then contact the office. Giving pain relievers by mouth such as NSAIDs or Tylenol can generally treat the reactions to steroid and lidocaine. Rare cases of infection have been noted. Rash, hives and itching may occur post injection. If you have muscle pain or cramps, flushing and or swelling of the face, rapid heart beat, nausea, dizziness, fever, chills, headache, difficulty breathing, swelling in the arms or legs, or have a prickly feeling of your skin, contact a health care provider immediately. Following this discussion, the knee was prepped with Alcohol and under sterile condition the 80 mg Depo-Medrol and 6 cc Lidocaine injection was performed with a 20 gauge needle through a bursa injection site. The needle was introduced into the pes bursa aspiration was performed to ensure bursal placement and the medication was injected. Upon withdrawal of the needle the site was cleaned with alcohol and a band aid applied. The patient tolerated the injection well and there were no adverse effects. Post injection instructions included no strenuous activity for 24 hours, cryotherapy and if there are any adverse effects to contact the office. \par \par

## 2020-08-26 NOTE — REASON FOR VISIT
[Follow-Up Visit] : a follow-up visit for [Knee Pain] : knee pain [FreeTextEntry2] : Bilateral.  F/U 3 months Cortisone Inj.

## 2020-09-28 ENCOUNTER — APPOINTMENT (OUTPATIENT)
Dept: PULMONOLOGY | Facility: CLINIC | Age: 80
End: 2020-09-28
Payer: MEDICARE

## 2020-09-28 VITALS
SYSTOLIC BLOOD PRESSURE: 136 MMHG | BODY MASS INDEX: 47.66 KG/M2 | DIASTOLIC BLOOD PRESSURE: 66 MMHG | WEIGHT: 269 LBS | OXYGEN SATURATION: 94 % | HEIGHT: 63 IN | HEART RATE: 68 BPM

## 2020-09-28 PROCEDURE — 99215 OFFICE O/P EST HI 40 MIN: CPT

## 2020-09-28 NOTE — DISCUSSION/SUMMARY
[FreeTextEntry1] : Morbid Obesity/ deconditioning - chronic dyspnea worsened, , mild pulmonary hypertension \par No significant airflow obstruction, \par Coronaries are normal, elevated LVEDP noted, mild decrease LV function\par Severe obstructive sleep apnea ( AHI 94) -  CPAP 14, now on 02 L\par previous ABG without hypercapnia, lung exam is normal, normal sp02\par  duplex neg 8/18 and CTA negative  for PE 8/18, now CR 1.4 \par will obtain stat D dimer, BNP, CXR, recent duplex negative\par PFts\par weight loss encouraged, cardiology input noted, ? role of Cardiomems pending above\par Thyroid followed by Endo\par 3 months or sooner if needed, ER if any change in status

## 2020-09-28 NOTE — CONSULT LETTER
[Dear  ___] : Dear  [unfilled], [Consult Letter:] : I had the pleasure of evaluating your patient, [unfilled]. [Please see my note below.] : Please see my note below. [Consult Closing:] : Thank you very much for allowing me to participate in the care of this patient.  If you have any questions, please do not hesitate to contact me. [Sincerely,] : Sincerely, [DrNeptali  ___] : Dr. VEGA [Chip Pederson MD] : Chip Pederson MD [[facility]] : [facility], [facility] [Director, Respiratory Care] : Director, Respiratory Care [[facility]] : [facility] [FreeTextEntry3] : Chip Pederson MD, FCCP, [facility]HANA. ABSM\par

## 2020-09-28 NOTE — PHYSICAL EXAM
[General Appearance - Well Developed] : well developed [Normal Appearance] : normal appearance [General Appearance - Well Nourished] : well nourished [No Deformities] : no deformities [General Appearance - In No Acute Distress] : no acute distress [Normal Conjunctiva] : the conjunctiva exhibited no abnormalities [Low Lying Soft Palate] : low lying soft palate [Elongated Uvula] : elongated uvula [Enlarged Base of the Tongue] : enlargement of the base of the tongue [III] : III [Neck Appearance] : the appearance of the neck was normal [Heart Rate And Rhythm] : heart rate and rhythm were normal [Heart Sounds] : normal S1 and S2 [Murmurs] : no murmurs present [Edema] : no peripheral edema present [Respiration, Rhythm And Depth] : normal respiratory rhythm and effort [Exaggerated Use Of Accessory Muscles For Inspiration] : no accessory muscle use [Auscultation Breath Sounds / Voice Sounds] : lungs were clear to auscultation bilaterally [Lungs Percussion] : the lungs were normal to percussion [Bowel Sounds] : normal bowel sounds [Abdomen Soft] : soft [Abnormal Walk] : normal gait [Nail Clubbing] : no clubbing of the fingernails [Cyanosis, Localized] : no localized cyanosis [No Focal Deficits] : no focal deficits [Oriented To Time, Place, And Person] : oriented to person, place, and time [Impaired Insight] : insight and judgment were intact [Affect] : the affect was normal [Memory Recent] : recent memory was not impaired [Skin Turgor] : normal skin turgor [] : no rash [FreeTextEntry1] : no chest wall [FreeTextEntry2] : left lower ext 2 plus edema

## 2020-09-28 NOTE — HISTORY OF PRESENT ILLNESS
[FreeTextEntry1] : more SOB last few months\par no fever, chill, chest pain\par no sig sputum\par compliant with cpap 14 with 02\par recently saw cardiology, now on diuretics\par no fever, chill, chest pain\par no sputum\par \par \par  contact guard

## 2020-09-30 ENCOUNTER — OUTPATIENT (OUTPATIENT)
Dept: OUTPATIENT SERVICES | Facility: HOSPITAL | Age: 80
LOS: 1 days | End: 2020-09-30
Payer: MEDICARE

## 2020-09-30 ENCOUNTER — APPOINTMENT (OUTPATIENT)
Dept: RADIOLOGY | Facility: CLINIC | Age: 80
End: 2020-09-30
Payer: MEDICARE

## 2020-09-30 DIAGNOSIS — Z98.89 OTHER SPECIFIED POSTPROCEDURAL STATES: Chronic | ICD-10-CM

## 2020-09-30 DIAGNOSIS — Z00.00 ENCOUNTER FOR GENERAL ADULT MEDICAL EXAMINATION WITHOUT ABNORMAL FINDINGS: ICD-10-CM

## 2020-09-30 DIAGNOSIS — Z90.710 ACQUIRED ABSENCE OF BOTH CERVIX AND UTERUS: Chronic | ICD-10-CM

## 2020-09-30 DIAGNOSIS — Z90.49 ACQUIRED ABSENCE OF OTHER SPECIFIED PARTS OF DIGESTIVE TRACT: Chronic | ICD-10-CM

## 2020-09-30 DIAGNOSIS — Z96.651 PRESENCE OF RIGHT ARTIFICIAL KNEE JOINT: Chronic | ICD-10-CM

## 2020-09-30 PROCEDURE — 71046 X-RAY EXAM CHEST 2 VIEWS: CPT

## 2020-09-30 PROCEDURE — 71046 X-RAY EXAM CHEST 2 VIEWS: CPT | Mod: 26

## 2020-10-02 ENCOUNTER — EMERGENCY (EMERGENCY)
Facility: HOSPITAL | Age: 80
LOS: 1 days | Discharge: HOME | End: 2020-10-02
Attending: EMERGENCY MEDICINE
Payer: MEDICARE

## 2020-10-02 VITALS
SYSTOLIC BLOOD PRESSURE: 170 MMHG | HEIGHT: 65 IN | DIASTOLIC BLOOD PRESSURE: 76 MMHG | OXYGEN SATURATION: 95 % | TEMPERATURE: 98 F | WEIGHT: 272.05 LBS | HEART RATE: 77 BPM | RESPIRATION RATE: 19 BRPM

## 2020-10-02 VITALS — WEIGHT: 275.14 LBS

## 2020-10-02 DIAGNOSIS — Z90.710 ACQUIRED ABSENCE OF BOTH CERVIX AND UTERUS: Chronic | ICD-10-CM

## 2020-10-02 DIAGNOSIS — Z98.89 OTHER SPECIFIED POSTPROCEDURAL STATES: Chronic | ICD-10-CM

## 2020-10-02 DIAGNOSIS — Z90.49 ACQUIRED ABSENCE OF OTHER SPECIFIED PARTS OF DIGESTIVE TRACT: Chronic | ICD-10-CM

## 2020-10-02 DIAGNOSIS — Z96.651 PRESENCE OF RIGHT ARTIFICIAL KNEE JOINT: Chronic | ICD-10-CM

## 2020-10-02 LAB
ALBUMIN SERPL ELPH-MCNC: 3.9 G/DL — SIGNIFICANT CHANGE UP (ref 3.3–5.2)
ALP SERPL-CCNC: 84 U/L — SIGNIFICANT CHANGE UP (ref 40–120)
ALT FLD-CCNC: 19 U/L — SIGNIFICANT CHANGE UP
ANION GAP SERPL CALC-SCNC: 12 MMOL/L — SIGNIFICANT CHANGE UP (ref 5–17)
APTT BLD: 27.6 SEC — SIGNIFICANT CHANGE UP (ref 27.5–35.5)
AST SERPL-CCNC: 24 U/L — SIGNIFICANT CHANGE UP
BASOPHILS # BLD AUTO: 0.09 K/UL — SIGNIFICANT CHANGE UP (ref 0–0.2)
BASOPHILS NFR BLD AUTO: 0.8 % — SIGNIFICANT CHANGE UP (ref 0–2)
BILIRUB SERPL-MCNC: 0.4 MG/DL — SIGNIFICANT CHANGE UP (ref 0.4–2)
BUN SERPL-MCNC: 16 MG/DL — SIGNIFICANT CHANGE UP (ref 8–20)
CALCIUM SERPL-MCNC: 9.9 MG/DL — SIGNIFICANT CHANGE UP (ref 8.6–10.2)
CHLORIDE SERPL-SCNC: 105 MMOL/L — SIGNIFICANT CHANGE UP (ref 98–107)
CO2 SERPL-SCNC: 25 MMOL/L — SIGNIFICANT CHANGE UP (ref 22–29)
CREAT SERPL-MCNC: 1.05 MG/DL — SIGNIFICANT CHANGE UP (ref 0.5–1.3)
DEPRECATED D DIMER PPP IA-ACNC: 447 NG/ML DDU
EOSINOPHIL # BLD AUTO: 0.33 K/UL — SIGNIFICANT CHANGE UP (ref 0–0.5)
EOSINOPHIL NFR BLD AUTO: 3.1 % — SIGNIFICANT CHANGE UP (ref 0–6)
GLUCOSE SERPL-MCNC: 111 MG/DL — HIGH (ref 70–99)
HCT VFR BLD CALC: 39.3 % — SIGNIFICANT CHANGE UP (ref 34.5–45)
HGB BLD-MCNC: 12.8 G/DL — SIGNIFICANT CHANGE UP (ref 11.5–15.5)
IMM GRANULOCYTES NFR BLD AUTO: 0.4 % — SIGNIFICANT CHANGE UP (ref 0–1.5)
INR BLD: 1.05 RATIO — SIGNIFICANT CHANGE UP (ref 0.88–1.16)
LYMPHOCYTES # BLD AUTO: 29.7 % — SIGNIFICANT CHANGE UP (ref 13–44)
LYMPHOCYTES # BLD AUTO: 3.18 K/UL — SIGNIFICANT CHANGE UP (ref 1–3.3)
MAGNESIUM SERPL-MCNC: 1.6 MG/DL — SIGNIFICANT CHANGE UP (ref 1.6–2.6)
MCHC RBC-ENTMCNC: 30 PG — SIGNIFICANT CHANGE UP (ref 27–34)
MCHC RBC-ENTMCNC: 32.6 GM/DL — SIGNIFICANT CHANGE UP (ref 32–36)
MCV RBC AUTO: 92.3 FL — SIGNIFICANT CHANGE UP (ref 80–100)
MONOCYTES # BLD AUTO: 0.75 K/UL — SIGNIFICANT CHANGE UP (ref 0–0.9)
MONOCYTES NFR BLD AUTO: 7 % — SIGNIFICANT CHANGE UP (ref 2–14)
NEUTROPHILS # BLD AUTO: 6.33 K/UL — SIGNIFICANT CHANGE UP (ref 1.8–7.4)
NEUTROPHILS NFR BLD AUTO: 59 % — SIGNIFICANT CHANGE UP (ref 43–77)
NT-PROBNP SERPL-MCNC: 163 PG/ML
NT-PROBNP SERPL-SCNC: 215 PG/ML — SIGNIFICANT CHANGE UP (ref 0–300)
PLATELET # BLD AUTO: 310 K/UL — SIGNIFICANT CHANGE UP (ref 150–400)
POTASSIUM SERPL-MCNC: 4.3 MMOL/L — SIGNIFICANT CHANGE UP (ref 3.5–5.3)
POTASSIUM SERPL-SCNC: 4.3 MMOL/L — SIGNIFICANT CHANGE UP (ref 3.5–5.3)
PROT SERPL-MCNC: 7.2 G/DL — SIGNIFICANT CHANGE UP (ref 6.6–8.7)
PROTHROM AB SERPL-ACNC: 12.1 SEC — SIGNIFICANT CHANGE UP (ref 10.6–13.6)
RBC # BLD: 4.26 M/UL — SIGNIFICANT CHANGE UP (ref 3.8–5.2)
RBC # FLD: 13.9 % — SIGNIFICANT CHANGE UP (ref 10.3–14.5)
SARS-COV-2 RNA SPEC QL NAA+PROBE: SIGNIFICANT CHANGE UP
SODIUM SERPL-SCNC: 141 MMOL/L — SIGNIFICANT CHANGE UP (ref 135–145)
TROPONIN T SERPL-MCNC: <0.01 NG/ML — SIGNIFICANT CHANGE UP (ref 0–0.06)
WBC # BLD: 10.72 K/UL — HIGH (ref 3.8–10.5)
WBC # FLD AUTO: 10.72 K/UL — HIGH (ref 3.8–10.5)

## 2020-10-02 PROCEDURE — 80053 COMPREHEN METABOLIC PANEL: CPT

## 2020-10-02 PROCEDURE — 71275 CT ANGIOGRAPHY CHEST: CPT

## 2020-10-02 PROCEDURE — 93010 ELECTROCARDIOGRAM REPORT: CPT

## 2020-10-02 PROCEDURE — 71275 CT ANGIOGRAPHY CHEST: CPT | Mod: 26

## 2020-10-02 PROCEDURE — 84484 ASSAY OF TROPONIN QUANT: CPT

## 2020-10-02 PROCEDURE — 85610 PROTHROMBIN TIME: CPT

## 2020-10-02 PROCEDURE — 36415 COLL VENOUS BLD VENIPUNCTURE: CPT

## 2020-10-02 PROCEDURE — 71045 X-RAY EXAM CHEST 1 VIEW: CPT | Mod: 26

## 2020-10-02 PROCEDURE — 99284 EMERGENCY DEPT VISIT MOD MDM: CPT | Mod: 25

## 2020-10-02 PROCEDURE — 83735 ASSAY OF MAGNESIUM: CPT

## 2020-10-02 PROCEDURE — 99285 EMERGENCY DEPT VISIT HI MDM: CPT

## 2020-10-02 PROCEDURE — 85730 THROMBOPLASTIN TIME PARTIAL: CPT

## 2020-10-02 PROCEDURE — 85025 COMPLETE CBC W/AUTO DIFF WBC: CPT

## 2020-10-02 PROCEDURE — 83880 ASSAY OF NATRIURETIC PEPTIDE: CPT

## 2020-10-02 PROCEDURE — U0003: CPT

## 2020-10-02 PROCEDURE — 71045 X-RAY EXAM CHEST 1 VIEW: CPT

## 2020-10-02 PROCEDURE — 93005 ELECTROCARDIOGRAM TRACING: CPT

## 2020-10-02 NOTE — ED ADULT NURSE NOTE - BREATHING, MLM
Protocol For Photochemotherapy: Tar And Broad Band Uvb (Goeckerman Treatment): The patient received Photochemotherapy: Tar and Broad Band UVB (Goeckerman treatment). Spontaneous, unlabored and symmetrical

## 2020-10-02 NOTE — ED ADULT NURSE NOTE - CHPI ED NUR SYMPTOMS NEG
no hemoptysis/no body aches/no cough/no headache/no wheezing/no edema/no diaphoresis/no fever/no chest pain/no chills/no shortness of breath

## 2020-10-02 NOTE — ED ADULT NURSE NOTE - OBJECTIVE STATEMENT
Patient presented to ed secondary to elevated D dimmer at primary physician's office. Patient currently not in acute distress oxygen saturation on 95% at room air. Patient calm and cooperative. Denies any pain at the moment,  at bed side. placed on cardiac monitor and fall precaution initiated. patient and family educated on fall. Call button provided and patient educated on how to use call button.

## 2020-10-02 NOTE — ED ADULT TRIAGE NOTE - CHIEF COMPLAINT QUOTE
pt reports trouble breathing x1 week, went to PMD monday and sent for blood work today, called back with elevated d-dimer and told to come to ED

## 2020-10-02 NOTE — ED STATDOCS - OBJECTIVE STATEMENT
81 y/o F with PMHx of sleep apnea(on CPAP at home), Diabetes, HTN, presents to the ED c/o intermittent SOB for the past couple of weeks. Pt reports she received blood work today with results of elevated d-dimer and was told to come to the ED by Dr. White. Pt states she also received chest x-ray 4 days ago that came back normal. Denies fevers, chills, sx of covid-19, or any known contact with any persons known to have tested positive for covid-19.

## 2020-10-02 NOTE — ED STATDOCS - PROGRESS NOTE DETAILS
PA note: PT evaluated by intake physician. HPI/PE/ROS as noted above. Will follow up plan per intake physician. Labs grossly unremarkable. Pending CTA. CTA negative for PE. Patchy atelectasis b/l. Pt feeling well, VSS, not hypoxic. Results d/w pt at length, given copy of results. Instructed to F/u with Dr. White as outpt for further evaluation. Return precautions given, stable for dc

## 2020-10-02 NOTE — ED STATDOCS - NS_ ATTENDINGSCRIBEDETAILS _ED_A_ED_FT
I, Maynor Tovar, performed the initial face to face bedside interview with this patient regarding history of present illness, review of symptoms and relevant past medical, social and family history.  I completed an independent physical examination.  I was the initial provider who evaluated this patient. I have signed out the follow up of any pending tests (i.e. labs, radiological studies) to the ACP.  I have communicated the patient’s plan of care and disposition with the ACP.  The history, relevant review of systems, past medical and surgical history, medical decision making, and physical examination was documented by the scribe in my presence and I attest to the accuracy of the documentation.

## 2020-10-02 NOTE — ED STATDOCS - PATIENT PORTAL LINK FT
You can access the FollowMyHealth Patient Portal offered by [facility] by registering at the following website: [email or web address]. By joining [employer]’s FollowMyHealth portal, you will also be able to view your health information using other applications (apps) compatible with our system.

## 2020-10-02 NOTE — ED STATDOCS - NSFOLLOWUPINSTRUCTIONS_ED_ALL_ED_FT
- Follow up with your doctor within 2-3 days.   - Return to the ED for any new or worsening symptoms.   - Follow-up with Dr. White for further evaluation of symptoms    Shortness of breath    Shortness of breath (dyspnea) means you have trouble breathing and could indicate a medical problem. Causes include lung disease, heart disease, low amount of red blood cells (anemia), poor physical fitness, being overweight, smoking, etc. Your health care provider today may not be able to find a cause for your shortness of breath after your exam. In this case, it is important to have a follow-up exam with your primary care physician as instructed. If medicines were prescribed, take them as directed for the full length of time directed. Refrain from tobacco products.    SEEK IMMEDIATE MEDICAL CARE IF YOU HAVE ANY OF THE FOLLOWING SYMPTOMS: worsening shortness of breath, chest pain, back pain, abdominal pain, fever, coughing up blood, lightheadedness/dizziness.

## 2020-10-05 ENCOUNTER — APPOINTMENT (OUTPATIENT)
Dept: FAMILY MEDICINE | Facility: CLINIC | Age: 80
End: 2020-10-05
Payer: MEDICARE

## 2020-10-05 VITALS
BODY MASS INDEX: 49.26 KG/M2 | WEIGHT: 278 LBS | HEIGHT: 63 IN | DIASTOLIC BLOOD PRESSURE: 60 MMHG | SYSTOLIC BLOOD PRESSURE: 140 MMHG | HEART RATE: 60 BPM

## 2020-10-05 DIAGNOSIS — R92.8 OTHER ABNORMAL AND INCONCLUSIVE FINDINGS ON DIAGNOSTIC IMAGING OF BREAST: ICD-10-CM

## 2020-10-05 DIAGNOSIS — Z23 ENCOUNTER FOR IMMUNIZATION: ICD-10-CM

## 2020-10-05 DIAGNOSIS — J06.9 ACUTE UPPER RESPIRATORY INFECTION, UNSPECIFIED: ICD-10-CM

## 2020-10-05 PROCEDURE — 90471 IMMUNIZATION ADMIN: CPT

## 2020-10-05 PROCEDURE — 90715 TDAP VACCINE 7 YRS/> IM: CPT

## 2020-10-05 PROCEDURE — 99214 OFFICE O/P EST MOD 30 MIN: CPT | Mod: 25

## 2020-10-06 PROBLEM — Z23 NEED FOR PROPHYLACTIC VACCINATION WITH COMBINED VACCINE: Status: ACTIVE | Noted: 2020-10-05

## 2020-10-06 PROBLEM — R92.8 ABNORMAL MAMMOGRAM: Status: ACTIVE | Noted: 2017-07-10

## 2020-10-06 PROBLEM — J06.9 ACUTE UPPER RESPIRATORY INFECTION: Status: RESOLVED | Noted: 2019-10-04 | Resolved: 2020-10-06

## 2020-10-06 NOTE — HISTORY OF PRESENT ILLNESS
[FreeTextEntry1] : tetanus vaccine, f/u htn [de-identified] : MS. MERIDA IS A PLEASANT 81 YO PRESENTING FOR FOLLOW-UP.  NOTES THAT SHE WENT TO SEE HER PULMONOLOGIST LAST WEEK AND WAS SENT TO ER ON FRIDAY FOR AN ELEVATED D-DIMER AND SOB.  HAD WORK-UP. INCLUDING LAB WORK, CARDIAC MONITORING AND CTA CHEST.  ALSO HAD VENOUS DUPLEX 2 WEEKS AGO WITH CARDIOLOGY.  PUT ON TORSEMIDE BY CARDIOLOGY. WILL BE SEEING PULMONARY IN FOLLOW-UP THIS WEEK.  GETS DYSPNEA ON EXERTION.  ALSO CHRONIC HISTORY OF HYPERTENSION, HYPERLIPIDEMIA, HYPOTHYROIDISM, DEPRESSION, OBESITY, ILD AND JAVON.  STILL TAKING METOPROLOL FOR BLOOD PRESSURE AND LIPITOR FOR CHOLESTEROL.  IS ON LEXAPRO FOR DEPRESSION.  NEW BABY FOR FAMILY.  NEEDS UPDATED TDAP.  PRIOR ABNORMAL MAMMOGRAM.  DUE FOR FOLLOW-UP SIX MONTH STUDY.  HAS NO BREAST COMPLAINTS.  NO PAIN, NIPPLE DISCHARGE, SKIN CHANGES OR LUMPS \par

## 2020-10-06 NOTE — REVIEW OF SYSTEMS
[Dyspnea on Exertion] : dyspnea on exertion [Negative] : Heme/Lymph [Chest Pain] : no chest pain [Palpitations] : no palpitations [Wheezing] : no wheezing [Cough] : no cough [Suicidal] : not suicidal [Anxiety] : no anxiety

## 2020-10-06 NOTE — PHYSICAL EXAM
[No Acute Distress] : no acute distress [Well Nourished] : well nourished [Well-Appearing] : well-appearing [Normal Sclera/Conjunctiva] : normal sclera/conjunctiva [PERRL] : pupils equal round and reactive to light [EOMI] : extraocular movements intact [No Lymphadenopathy] : no lymphadenopathy [Supple] : supple [No Respiratory Distress] : no respiratory distress  [No Accessory Muscle Use] : no accessory muscle use [Clear to Auscultation] : lungs were clear to auscultation bilaterally [Normal Rate] : normal rate  [Regular Rhythm] : with a regular rhythm [Normal S1, S2] : normal S1 and S2 [Soft] : abdomen soft [Non Tender] : non-tender [Normal Bowel Sounds] : normal bowel sounds [Speech Grossly Normal] : speech grossly normal [Normal Affect] : the affect was normal [Normal Mood] : the mood was normal

## 2020-10-06 NOTE — PLAN
[FreeTextEntry1] : TDAP GIVEN AND TOLERATED WELL\par CONSIDER SHINGRIX\par MONITOR BLOOD PRESSURE\par HEALTHY DIET AND LIFESTYLE MODIFICATIONS; HEALTHY WEIGHT LOSS \par TO SEE PULMONOLOGY IN FOLLOW-UP THIS WEEK\par DOING WELL ON LEXAPRO - WILL CONTINUE\par GOOD SUPPORT SYSTEM\par FOLLOW-UP ALL SPECIALISTS AS DIRECTED \par CALL WITH ANY QUESTIONS, CONCERNS OR CHANGES

## 2020-10-07 ENCOUNTER — APPOINTMENT (OUTPATIENT)
Dept: PULMONOLOGY | Facility: CLINIC | Age: 80
End: 2020-10-07
Payer: MEDICARE

## 2020-10-07 VITALS — OXYGEN SATURATION: 95 %

## 2020-10-07 VITALS
BODY MASS INDEX: 50.14 KG/M2 | HEIGHT: 63 IN | WEIGHT: 283 LBS | HEART RATE: 60 BPM | OXYGEN SATURATION: 94 % | SYSTOLIC BLOOD PRESSURE: 130 MMHG | DIASTOLIC BLOOD PRESSURE: 60 MMHG

## 2020-10-07 PROCEDURE — 99215 OFFICE O/P EST HI 40 MIN: CPT | Mod: 25

## 2020-10-07 PROCEDURE — 94010 BREATHING CAPACITY TEST: CPT

## 2020-10-07 NOTE — DISCUSSION/SUMMARY
[FreeTextEntry1] : Morbid Obesity/ deconditioning - chronic dyspnea worsened, , mild pulmonary hypertension \par D dimer elevated, sent to ER, CTA negative for PE, recent duplex neg , BNP normal\par No significant airflow obstruction on last PFTs, will repeat sherice today\par Coronaries are normal, elevated LVEDP noted, mild decrease LV function\par Severe obstructive sleep apnea ( AHI 94) -  CPAP 14, now on 02 L, compliant 97% > 4 hrs\par  lung exam is normal, normal sp02\par cardiology input noted, ? role of Cardiomems given mean LAP , will discuss with them\par Thyroid followed by Endo\par Weight loss discussed, Dr Loza input, would also benefit from Rehab\par vaccinations this fall \par 4-6 months or sooner if needed, will call with spirometry

## 2020-10-07 NOTE — REVIEW OF SYSTEMS
[Recent Wt Gain (___ Lbs)] : recent [unfilled] ~Ulb weight gain [Dry Eyes] : dryness of the eyes [Hypertension] : ~T hypertension [Edema] : ~T edema was present [Diabetes] : diabetes mellitus [As Noted in HPI] : as noted in HPI [Negative] : Psychiatric [Fever] : no fever [Chills] : no chills [Eye Irritation] : no ~T irritation of the eyes [Nasal Congestion] : no nasal congestion [Epistaxis] : no nosebleeds [Postnasal Drip] : no postnasal drip [Sinus Problems] : no sinus problems [Cough] : no cough [Sputum] : not coughing up ~M sputum [Dyspnea] : no dyspnea [Chest Tightness] : no chest tightness [Pleuritic Pain] : no pleuritic pain [Wheezing] : no wheezing [Chest Discomfort] : no chest discomfort [Dysrhythmia] : no dysrhythmia [Murmurs] : no murmurs were heard [Palpitations] : no palpitations [Hay Fever] : no hay fever [Itchy Eyes] : no itching of ~T the eyes [Reflux] : no reflux [Nausea] : no nausea [Vomiting] : no vomiting [Constipation] : no constipation [Diarrhea] : no diarrhea [Abdominal Pain] : no abdominal pain [Dysuria] : no dysuria [Trauma] : no ~T physical trauma [Fracture] : no fracture [Anemia] : no anemia [Headache] : no headache [Dizziness] : no dizziness [Syncope] : no fainting [Numbness] : no numbness [Paralysis] : no paralysis was seen [Seizures] : no seizures [Depression] : no depression [Anxiety] : no anxiety [Thyroid Problem] : no thyroid problem

## 2020-10-07 NOTE — REASON FOR VISIT
[Follow-Up] : a follow-up visit [Shortness of Breath] : shortness of Breath [Sleep Apnea] : sleep apnea [FreeTextEntry2] : JAVON

## 2020-10-07 NOTE — ADDENDUM
[FreeTextEntry1] : spirometry with mild restriction, 13 wt gain noted from last sherice, FVC decreased  from 2017\par

## 2020-10-07 NOTE — HISTORY OF PRESENT ILLNESS
[FreeTextEntry1] : more SOB last few months\par had elevated D dimer, CTA in ER negative\par no fever, chill, chest pain\par no sig sputum\par compliant with cpap 14 with 02\par recently saw cardiology, ? benefit of diuretics\par no sputum\par \par \par

## 2020-10-07 NOTE — CONSULT LETTER
[Dear  ___] : Dear  [unfilled], [Consult Letter:] : I had the pleasure of evaluating your patient, [unfilled]. [Please see my note below.] : Please see my note below. [Consult Closing:] : Thank you very much for allowing me to participate in the care of this patient.  If you have any questions, please do not hesitate to contact me. [Sincerely,] : Sincerely, [DrNeptali  ___] : Dr. VEGA [Chip Pederson MD] : Chip Pederson MD [[facility], [facility]] : [facility], [facility] [Director, Respiratory Care] : Director, Respiratory Care [[facility]] : [facility] [FreeTextEntry3] : Chip Pederson MD, FCCP, [facility]HA[facility]. ABSM\par

## 2020-10-16 ENCOUNTER — APPOINTMENT (OUTPATIENT)
Dept: CARDIOLOGY | Facility: CLINIC | Age: 80
End: 2020-10-16
Payer: MEDICARE

## 2020-10-16 ENCOUNTER — NON-APPOINTMENT (OUTPATIENT)
Age: 80
End: 2020-10-16

## 2020-10-16 VITALS
TEMPERATURE: 98.6 F | SYSTOLIC BLOOD PRESSURE: 138 MMHG | HEIGHT: 63 IN | BODY MASS INDEX: 47.48 KG/M2 | OXYGEN SATURATION: 95 % | HEART RATE: 64 BPM | RESPIRATION RATE: 20 BRPM | DIASTOLIC BLOOD PRESSURE: 68 MMHG | WEIGHT: 268 LBS

## 2020-10-16 PROCEDURE — 93000 ELECTROCARDIOGRAM COMPLETE: CPT

## 2020-10-16 PROCEDURE — 99214 OFFICE O/P EST MOD 30 MIN: CPT

## 2020-10-26 ENCOUNTER — RX RENEWAL (OUTPATIENT)
Age: 80
End: 2020-10-26

## 2020-10-27 ENCOUNTER — RX RENEWAL (OUTPATIENT)
Age: 80
End: 2020-10-27

## 2020-11-16 ENCOUNTER — APPOINTMENT (OUTPATIENT)
Dept: ENDOCRINOLOGY | Facility: CLINIC | Age: 80
End: 2020-11-16

## 2020-11-17 ENCOUNTER — TRANSCRIPTION ENCOUNTER (OUTPATIENT)
Age: 80
End: 2020-11-17

## 2020-11-25 ENCOUNTER — APPOINTMENT (OUTPATIENT)
Dept: ORTHOPEDIC SURGERY | Facility: CLINIC | Age: 80
End: 2020-11-25
Payer: MEDICARE

## 2020-11-25 VITALS
SYSTOLIC BLOOD PRESSURE: 127 MMHG | BODY MASS INDEX: 47.48 KG/M2 | HEIGHT: 63 IN | WEIGHT: 268 LBS | DIASTOLIC BLOOD PRESSURE: 84 MMHG | HEART RATE: 71 BPM

## 2020-11-25 PROCEDURE — 73562 X-RAY EXAM OF KNEE 3: CPT | Mod: 50

## 2020-11-25 PROCEDURE — 99214 OFFICE O/P EST MOD 30 MIN: CPT | Mod: 25

## 2020-11-25 PROCEDURE — 20610 DRAIN/INJ JOINT/BURSA W/O US: CPT | Mod: 50

## 2020-11-25 NOTE — END OF VISIT
[FreeTextEntry3] : I, Daryl Merlos, acted solely as a scribe for Dr. Nicholas Davidson on this date 11/25/2020.

## 2020-11-25 NOTE — PROCEDURE
[de-identified] : I injected the patient's left knee today with cortisone.\par \par I discussed at length with the patient the planned steroid and lidocaine injection. The risks, benefits, convalescence and alternatives were reviewed. The possible side effects discussed included but were not limited to: pain, swelling, heat, bleeding, and redness. Symptoms are generally mild but if they are extensive then contact the office. Giving pain relievers by mouth such as NSAIDs or Tylenol can generally treat the reactions to steroid and lidocaine. Rare cases of infection have been noted. Rash, hives and itching may occur post injection. If you have muscle pain or cramps, flushing and or swelling of the face, rapid heart beat, nausea, dizziness, fever, chills, headache, difficulty breathing, swelling in the arms or legs, or have a prickly feeling of your skin, contact a health care provider immediately. Following this discussion, the knee was prepped with Alcohol and under sterile condition the 80 mg Depo-Medrol and 6 cc Lidocaine injection was performed with a 20 gauge needle through a superolateral injection site. The needle was introduced into the joint, aspiration was performed to ensure intra-articular placement and the medication was injected. Upon withdrawal of the needle the site was cleaned with alcohol and a band aid applied. The patient tolerated the injection well and there were no adverse effects. Post injection instructions included no strenuous activity for 24 hours, cryotherapy and if there are any adverse effects to contact the office. \par \par \par I injected the patient's right pes bursa today with cortisone.\par \par I discussed at length with the patient the planned steroid and lidocaine injection. The risks, benefits, convalescence and alternatives were reviewed. The possible side effects discussed included but were not limited to: pain, swelling, heat, bleeding, and redness. Symptoms are generally mild but if they are extensive then contact the office. Giving pain relievers by mouth such as NSAIDs or Tylenol can generally treat the reactions to steroid and lidocaine. Rare cases of infection have been noted. Rash, hives and itching may occur post injection. If you have muscle pain or cramps, flushing and or swelling of the face, rapid heart beat, nausea, dizziness, fever, chills, headache, difficulty breathing, swelling in the arms or legs, or have a prickly feeling of your skin, contact a health care provider immediately. Following this discussion, the knee was prepped with Alcohol and under sterile condition the 80 mg Depo-Medrol and 6 cc Lidocaine injection was performed with a 20 gauge needle through a superolateral injection site. The needle was introduced into the joint, aspiration was performed to ensure intra-articular placement and the medication was injected. Upon withdrawal of the needle the site was cleaned with alcohol and a band aid applied. The patient tolerated the injection well and there were no adverse effects. Post injection instructions included no strenuous activity for 24 hours, cryotherapy and if there are any adverse effects to contact the office.

## 2020-11-25 NOTE — PHYSICAL EXAM
[UE/LE] : Sensory: Intact in bilateral upper & lower extremities [ALL] : dorsalis pedis, posterior tibial, femoral, popliteal, and radial 2+ and symmetric bilaterally [Antalgic] : not antalgic [Apractic] : not apractic [Ataxic] : not ataxic [Coxalgic] : not coxalgic [de-identified] : GENERAL APPEARANCE: Well nourished and hydrated, pleasant, alert, and oriented x 3. Appears their stated age. \par HEENT: Normocephalic, extraocular eye motion intact. Nasal septum midline. Oral cavity clear. External auditory canal clear. \par RESPIRATORY: Breath sounds clear and audible in all lobes. No wheezing, No accessory muscle use.\par CARDIOVASCULAR: No apparent abnormalities. No lower leg edema. No varicosities. Pedal pulses are palpable.\par NEUROLOGIC: Sensation is normal, no muscle weakness in the upper or lower extremities.\par DERMATOLOGIC: No apparent skin lesions, moist, warm, no rash.\par SPINE: Cervical spine appears normal and moves freely; thoracic spine appears normal and moves freely; lumbosacral spine appears normal and moves freely, normal, nontender.\par MUSCULOSKELETAL: Hands, wrists, and elbows are normal and move freely, shoulders are normal and move freely. \par Musculoskeletal: Gait: normal and not antalgic . Examination of right knee demonstrates healed midline incision, no obvious signs of infection, no joint effusion, no erythema, smooth and active full range of motion without pain 0- 120 degrees, tenderness over the pes anserine bursa. \par Left knee examination shows varus alignment, medial joint line tenderness and pain with palpation, crepitus with range of motion. \par  \par 5/5 motor strength in bilateral lower extremities. Sensory: Intact in bilateral lower extremities. DTRs: Biceps, brachioradialis, triceps, patellar, ankle and plantar 2+ and symmetric bilaterally. Pulses: dorsalis pedis, posterior tibial, femoral, popliteal, and radial 2+ and symmetric bilaterally. \par Constitutional: Alert and in no acute distress and obese, but well-appearing and not in acute distress. \par  [de-identified] : 3V xray of the left knee done in the office today and reviewed by Dr. 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 Nicholas Davidsno demonstrates s/p implants in good positioning with no evidence of wear, loosening, or subsidence.

## 2020-11-25 NOTE — HISTORY OF PRESENT ILLNESS
[Pain Location] : pain [Worsening] : worsening [___ yrs] : [unfilled] year(s) ago [7] : a current pain level of 7/10 [8] : a maximum pain level of 8/10 [Standing] : standing [Constant] : ~He/She~ states the symptoms seem to be constant [Walking] : worsened by walking [Knee Flexion] : worsened with knee flexion [Knee Extension] : worsened with knee extension [de-identified] : 80 year old obese female here for f/u of bilateral knee pain where the left knee pain is worse than the right knee pain. She is s/p right TKA DOS: 4/7/2015. Patient had cortisone injection to the left knee intraarticular and right knee pes bursa August 2020. She reports 3 weeks relief with left knee cortisone injection. Right knee pain is only with direct pressure or touch. She reports worsening pain in the left knee. She c/o severe left knee pain and instability. The pain is constant. Pt had some concern with b/l LE neuropathy and was referred to neurology but she did not go. She was seen by vascular and using Venodyne boots 1 hour a day. Pt has DM.

## 2020-11-25 NOTE — DISCUSSION/SUMMARY
[Surgical risks reviewed] : Surgical risks reviewed [de-identified] : 80 year old female with with end-stage medial compartment osteoarthritis of the left knee. She is a candidate for left TKA, but wishes to continue with conservative treatment. Pt does have MHx of DM we will need to F/U with HbA1c should she wish to pursue surgery in the future. She elected to receive a cortisone injection in her left knee which she tolerated well. Pt is thinking about pursuing surgery in Spring 2021. She is also s/p right TKA DOS 4/7/2015 with pes bursitis of the right knee. We reassured the pt that her hardware is in good positioning with no evidence of wear, loosening, or subsidence. She has responded well to right knee pes bursa cortisone injections in the past and she elected to receive repeat injection today. F/u with us in three months for repeat cortisone injections if needed. \par \par \par The patient is a 80 year individual with end stage arthritis of their left knee joint. Based upon the patient's continued symptoms and failure to respond to conservative treatment I have recommended a left total knee arthroplasty for this patient. A long discussion took place with the patient describing what a total joint replacement is and what the procedure would entail. A total knee arthroplasty model, similar to the implant that was used during the operation, was utilized to demonstrate and to discuss the various bearing surfaces of the implants. The hospitalization and post-operative care and rehabilitation were also discussed. The use of perioperative antibiotics and DVT prophylaxis were discussed. The risk, benefits and alternatives to a surgical intervention were discussed at length with the patient. The patient was also advised of risks related to the medical comorbidities, elevated body mass index (BMI), and smoking where applicable. We discussed how to reduce modifiable risk factors and encouraged smoking cessation were applicable.. A lengthy discussion took place to review the most common complications including but not limited to: deep vein thrombosis, pulmonary embolus, heart attack, stroke, infection, wound breakdown, numbness, damage to nerves, tendon, muscles, arteries or other blood vessels, death and other possible complications from anesthesia. The patient was told that we will take steps to minimize these risks by using sterile technique, antibiotics and DVT prophylaxis when appropriate and follow the patient postoperatively in the office setting to monitor progress. The possibility of recurrent pain, no improvement in pain and actual worsening of pain were also discussed with the patient.\par The discharge plan of care focused on the patient going home following surgery. The patient was encouraged to make the necessary arrangements to have someone stay with them when they are discharged home. Following discharge, a home care nurse was to the patient. The home care nurse would open the patient’s home care case and request home physical therapy services. Home physical therapy was to commence following discharge provided it was appropriate and covered by the health insurance benefit plan. \par The benefits of surgery were discussed with the patient including the potential for improving her current clinical condition through operative intervention. Alternatives to surgical intervention including continued conservative management were also discussed in detail. All questions were answered to the satisfaction of the patient. The treatment plan of care, as well as a model of a total knee arthroplasty equivalent to the one that will be used for their total joint replacement, was shared with the patient. The patient agreed to the plan of care as well as the use of implants in their total joint replacement.

## 2020-12-09 LAB
ALBUMIN SERPL ELPH-MCNC: 4.1 G/DL
ALP BLD-CCNC: 99 U/L
ALT SERPL-CCNC: 16 U/L
ANION GAP SERPL CALC-SCNC: 12 MMOL/L
AST SERPL-CCNC: 19 U/L
BASOPHILS # BLD AUTO: 0.13 K/UL
BASOPHILS NFR BLD AUTO: 1.1 %
BILIRUB SERPL-MCNC: 0.4 MG/DL
BUN SERPL-MCNC: 25 MG/DL
CALCIUM SERPL-MCNC: 10.2 MG/DL
CHLORIDE SERPL-SCNC: 103 MMOL/L
CHOLEST SERPL-MCNC: 132 MG/DL
CO2 SERPL-SCNC: 25 MMOL/L
CREAT SERPL-MCNC: 1.13 MG/DL
EOSINOPHIL # BLD AUTO: 0.29 K/UL
EOSINOPHIL NFR BLD AUTO: 2.3 %
ESTIMATED AVERAGE GLUCOSE: 151 MG/DL
GLUCOSE SERPL-MCNC: 119 MG/DL
HBA1C MFR BLD HPLC: 6.9 %
HCT VFR BLD CALC: 42.7 %
HDLC SERPL-MCNC: 60 MG/DL
HGB BLD-MCNC: 13.4 G/DL
IMM GRANULOCYTES NFR BLD AUTO: 0.2 %
LDLC SERPL CALC-MCNC: 56 MG/DL
LDLC SERPL DIRECT ASSAY-MCNC: 59 MG/DL
LYMPHOCYTES # BLD AUTO: 2.95 K/UL
LYMPHOCYTES NFR BLD AUTO: 23.9 %
MAN DIFF?: NORMAL
MCHC RBC-ENTMCNC: 29.5 PG
MCHC RBC-ENTMCNC: 31.4 GM/DL
MCV RBC AUTO: 93.8 FL
MONOCYTES # BLD AUTO: 0.8 K/UL
MONOCYTES NFR BLD AUTO: 6.5 %
NEUTROPHILS # BLD AUTO: 8.16 K/UL
NEUTROPHILS NFR BLD AUTO: 66 %
NONHDLC SERPL-MCNC: 72 MG/DL
PLATELET # BLD AUTO: 364 K/UL
POTASSIUM SERPL-SCNC: 4.2 MMOL/L
PROT SERPL-MCNC: 7.1 G/DL
RBC # BLD: 4.55 M/UL
RBC # FLD: 13.8 %
SODIUM SERPL-SCNC: 140 MMOL/L
T4 FREE SERPL-MCNC: 1.5 NG/DL
TRIGL SERPL-MCNC: 81 MG/DL
TSH SERPL-ACNC: 2.1 UIU/ML
WBC # FLD AUTO: 12.36 K/UL

## 2020-12-10 ENCOUNTER — APPOINTMENT (OUTPATIENT)
Dept: ENDOCRINOLOGY | Facility: CLINIC | Age: 80
End: 2020-12-10
Payer: MEDICARE

## 2020-12-10 VITALS
DIASTOLIC BLOOD PRESSURE: 80 MMHG | SYSTOLIC BLOOD PRESSURE: 140 MMHG | WEIGHT: 267 LBS | TEMPERATURE: 95.2 F | BODY MASS INDEX: 47.31 KG/M2 | HEIGHT: 63 IN

## 2020-12-10 PROCEDURE — 99214 OFFICE O/P EST MOD 30 MIN: CPT

## 2020-12-10 PROCEDURE — 99072 ADDL SUPL MATRL&STAF TM PHE: CPT

## 2020-12-10 RX ORDER — LIDOCAINE 5 G/100G
5 OINTMENT TOPICAL 3 TIMES DAILY
Qty: 1 | Refills: 1 | Status: DISCONTINUED | COMMUNITY
Start: 2019-07-12 | End: 2020-12-10

## 2020-12-10 RX ORDER — FLUTICASONE PROPIONATE 50 UG/1
50 SPRAY, METERED NASAL DAILY
Qty: 16 | Refills: 0 | Status: DISCONTINUED | COMMUNITY
Start: 2019-10-04 | End: 2020-12-10

## 2020-12-10 NOTE — HISTORY OF PRESENT ILLNESS
[FreeTextEntry1] : Type: 2\par Severity: moderate\par Duration: 10+ years\par \par Associated symptoms-\par Last eye exam: every 6 months, no DR per patient\par Last foot exam: in office August 2020. Denies pain/numbness/tingling in B/L feet\par Kidney disease: none\par Heart disease: none\par \par Modifying factors-\par Current meds for glycemic control:\par Metformin 1000 mg BID\par Glimepiride 2 mg QD with dinner\par \par Diet: 2-3 meals daily, snacks between meals. No eating after dinner.\par Weight: stable\par Exercise: none due to knee pain\par \par SMBG once every other day at different times of the day. Reports readings are usually 120s to 160s. Denies hypoglycemia. In the past month, reports high of 163. \par \par Hypothyroidism\par LT4 50 mcg daily, takes appropriately\par

## 2020-12-10 NOTE — REASON FOR VISIT
[Follow - Up] : a follow-up visit [DM Type 2] : DM Type 2 [Hypothyroidism] : hypothyroidism [Osteoporosis] : osteoporosis [Thyroid nodule/ MNG] : thyroid nodule/ MNG

## 2020-12-10 NOTE — ASSESSMENT
[FreeTextEntry1] : 80 year old female with T2DM, hypertension, hyperlipidemia, hypothyroidism, thyroid nodule, and osteoporosis. Glycemic control is acceptable.\par \par 1. T2DM- A1c 6.9%.\par -Continue Metformin and Glimepiride.\par -Continue SMBG at variable times.\par -Stressed importance of diet, exercise, and weight loss to maintain glycemic control.\par -Repeat A1c in 3 months.\par \par 2. Hypertension- borderline today.\par -Reviewed BP goals.\par -GFR improved. \par -Repeat TORIE with next labs.\par -Discussed starting ACE/ARB. Will wait until next visit to check BP and see results of TORIE.\par -Advised low salt diet.\par \par 3. Hyperlipidemia- LDL at target.\par -Continue statin.\par -Repeat lipids in 3 months. \par \par 4. Hypothyroid- euthyroid on replacement T4.\par -Continue current dose of LT4.\par -Repeat TFTs in 3 months.\par \par 5. NTMNG s/p nondiagnostic FNA of left 1.3 cm dominant nodule, likely due to rim calcification.\par -Repeat sonogram now.\par \par 6. Osteoporosis\par -Continue vitamin D and MVI.\par -Encouraged weight bearing exercises.\par -Repeat DEXA May 2022.

## 2020-12-10 NOTE — PHYSICAL EXAM
[Alert] : alert [Well Nourished] : well nourished [No Acute Distress] : no acute distress [Well Developed] : well developed [Normal Sclera/Conjunctiva] : normal sclera/conjunctiva [EOMI] : extra ocular movement intact [No Proptosis] : no proptosis [Thyroid Not Enlarged] : the thyroid was not enlarged [No Thyroid Nodules] : no palpable thyroid nodules [No Respiratory Distress] : no respiratory distress [No Accessory Muscle Use] : no accessory muscle use [Clear to Auscultation] : lungs were clear to auscultation bilaterally [Normal S1, S2] : normal S1 and S2 [Normal Rate] : heart rate was normal [Regular Rhythm] : with a regular rhythm [Normal Anterior Cervical Nodes] : no anterior cervical lymphadenopathy [Normal Posterior Cervical Nodes] : no posterior cervical lymphadenopathy [No Rash] : no rash [No Tremors] : no tremors [Oriented x3] : oriented to person, place, and time [Normal Affect] : the affect was normal [Normal Mood] : the mood was normal [Acanthosis Nigricans] : no acanthosis nigricans [de-identified] : B/L LE edema

## 2020-12-10 NOTE — REVIEW OF SYSTEMS
[SOB on Exertion] : shortness of breath on exertion [Joint Pain] : joint pain [Depression] : depression [Polydipsia] : polydipsia [Recent Weight Gain (___ Lbs)] : no recent weight gain [Recent Weight Loss (___ Lbs)] : no recent weight loss [Blurred Vision] : no blurred vision [Dysphagia] : no dysphagia [Neck Pain] : no neck pain [Chest Pain] : no chest pain [Palpitations] : no palpitations [Shortness Of Breath] : no shortness of breath [Nausea] : no nausea [Constipation] : no constipation [Abdominal Pain] : no abdominal pain [Vomiting] : no vomiting [Diarrhea] : no diarrhea [Polyuria] : no polyuria [Tremors] : no tremors [Pain/Numbness of Digits] : no pain/numbness of digits [Anxiety] : no anxiety

## 2021-01-27 ENCOUNTER — NON-APPOINTMENT (OUTPATIENT)
Age: 81
End: 2021-01-27

## 2021-02-26 ENCOUNTER — APPOINTMENT (OUTPATIENT)
Dept: ORTHOPEDIC SURGERY | Facility: CLINIC | Age: 81
End: 2021-02-26
Payer: MEDICARE

## 2021-02-26 ENCOUNTER — NON-APPOINTMENT (OUTPATIENT)
Age: 81
End: 2021-02-26

## 2021-02-26 ENCOUNTER — APPOINTMENT (OUTPATIENT)
Dept: CARDIOLOGY | Facility: CLINIC | Age: 81
End: 2021-02-26
Payer: MEDICARE

## 2021-02-26 VITALS — SYSTOLIC BLOOD PRESSURE: 128 MMHG | DIASTOLIC BLOOD PRESSURE: 70 MMHG

## 2021-02-26 VITALS
DIASTOLIC BLOOD PRESSURE: 75 MMHG | WEIGHT: 266 LBS | TEMPERATURE: 98.3 F | HEART RATE: 72 BPM | HEIGHT: 63 IN | SYSTOLIC BLOOD PRESSURE: 150 MMHG | OXYGEN SATURATION: 96 % | BODY MASS INDEX: 47.13 KG/M2

## 2021-02-26 VITALS
SYSTOLIC BLOOD PRESSURE: 142 MMHG | BODY MASS INDEX: 47.31 KG/M2 | DIASTOLIC BLOOD PRESSURE: 74 MMHG | HEIGHT: 63 IN | OXYGEN SATURATION: 92 % | WEIGHT: 267 LBS | HEART RATE: 80 BPM

## 2021-02-26 PROCEDURE — 93000 ELECTROCARDIOGRAM COMPLETE: CPT

## 2021-02-26 PROCEDURE — 20610 DRAIN/INJ JOINT/BURSA W/O US: CPT | Mod: 50

## 2021-02-26 PROCEDURE — 99072 ADDL SUPL MATRL&STAF TM PHE: CPT

## 2021-02-26 PROCEDURE — 99214 OFFICE O/P EST MOD 30 MIN: CPT

## 2021-02-26 PROCEDURE — 99214 OFFICE O/P EST MOD 30 MIN: CPT | Mod: 25

## 2021-02-26 NOTE — HISTORY OF PRESENT ILLNESS
[Pain Location] : pain [Worsening] : worsening [___ yrs] : [unfilled] year(s) ago [7] : a current pain level of 7/10 [8] : a maximum pain level of 8/10 [Standing] : standing [Constant] : ~He/She~ states the symptoms seem to be constant [Walking] : worsened by walking [Knee Flexion] : worsened with knee flexion [Knee Extension] : worsened with knee extension [Rest] : relieved by rest [de-identified] : 80 year old obese female here for f/u of bilateral knee pain where the left knee pain is worse than the right knee pain. She is s/p right TKA DOS: 4/7/2015. Patient had cortisone injection to the left knee intraarticular and right knee pes bursa November 2020. She reports 3 weeks relief with left knee cortisone injection. Right knee pain is only with direct pressure or touch. She reports worsening pain in the left knee. She c/o severe left knee pain and instability. The pain is constant. Pt had some concern with b/l LE neuropathy and was referred to neurology but she did not go. She was seen by vascular and using Venodyne boots 1 hour a day. Pt has DM.

## 2021-02-26 NOTE — PROCEDURE
[de-identified] : I injected the patient's left knee and right pes bursa today with cortisone.\par \par I discussed at length with the patient the planned steroid and lidocaine injection. The risks, benefits, convalescence and alternatives were reviewed. The possible side effects discussed included but were not limited to: pain, swelling, heat, bleeding, and redness. Symptoms are generally mild but if they are extensive then contact the office. Giving pain relievers by mouth such as NSAIDs or Tylenol can generally treat the reactions to steroid and lidocaine. Rare cases of infection have been noted. Rash, hives and itching may occur post injection. If you have muscle pain or cramps, flushing and or swelling of the face, rapid heart beat, nausea, dizziness, fever, chills, headache, difficulty breathing, swelling in the arms or legs, or have a prickly feeling of your skin, contact a health care provider immediately. Following this discussion, the knee was prepped with Alcohol and under sterile condition the 80 mg Depo-Medrol and 6 cc Lidocaine injection was performed with a 20 gauge needle through a superolateral injection site. The needle was introduced into the joint, aspiration was performed to ensure intra-articular placement and the medication was injected. Upon withdrawal of the needle the site was cleaned with alcohol and a band aid applied. The patient tolerated the injection well and there were no adverse effects. Post injection instructions included no strenuous activity for 24 hours, cryotherapy and if there are any adverse effects to contact the office.

## 2021-02-26 NOTE — END OF VISIT
[FreeTextEntry3] : I, Daryl Merlos, acted solely as a scribe for Dr. Nicholas Davidson on this date 02/26/2021.

## 2021-02-26 NOTE — PHYSICAL EXAM
[UE/LE] : Sensory: Intact in bilateral upper & lower extremities [ALL] : dorsalis pedis, posterior tibial, femoral, popliteal, and radial 2+ and symmetric bilaterally [Normal] : Alert and in no acute distress [Antalgic] : not antalgic [Apractic] : not apractic [Ataxic] : not ataxic [Coxalgic] : not coxalgic [Poor Appearance] : well-appearing [de-identified] : GENERAL APPEARANCE: Well nourished and hydrated, pleasant, alert, and oriented x 3. Appears their stated age. \par HEENT: Normocephalic, extraocular eye motion intact. Nasal septum midline. Oral cavity clear. External auditory canal clear. \par RESPIRATORY: Breath sounds clear and audible in all lobes. No wheezing, No accessory muscle use.\par CARDIOVASCULAR: No apparent abnormalities. No lower leg edema. No varicosities. Pedal pulses are palpable.\par NEUROLOGIC: Sensation is normal, no muscle weakness in the upper or lower extremities.\par DERMATOLOGIC: No apparent skin lesions, moist, warm, no rash.\par SPINE: Cervical spine appears normal and moves freely; thoracic spine appears normal and moves freely; lumbosacral spine appears normal and moves freely, normal, nontender.\par MUSCULOSKELETAL: Hands, wrists, and elbows are normal and move freely, shoulders are normal and move freely. \par Musculoskeletal: Gait: normal and not antalgic .  [de-identified] : Examination of right knee demonstrates healed midline incision, no obvious signs of infection, no joint effusion, no erythema, smooth and active full range of motion without pain 0- 120 degrees, tenderness over the pes anserine bursa. \par Left knee examination shows varus alignment, medial joint line tenderness and pain with palpation, crepitus with range of motion.

## 2021-02-26 NOTE — HISTORY OF PRESENT ILLNESS
[Pain Location] : pain [Worsening] : worsening [___ yrs] : [unfilled] year(s) ago [7] : a current pain level of 7/10 [8] : a maximum pain level of 8/10 [Standing] : standing [Constant] : ~He/She~ states the symptoms seem to be constant [Walking] : worsened by walking [Knee Flexion] : worsened with knee flexion [Knee Extension] : worsened with knee extension [Rest] : relieved by rest [de-identified] : 80 year old obese female here for f/u of bilateral knee pain where the left knee pain is worse than the right knee pain. She is s/p right TKA DOS: 4/7/2015. Patient had cortisone injection to the left knee intraarticular and right knee pes bursa November 2020. She reports 3 weeks relief with left knee cortisone injection. Right knee pain is only with direct pressure or touch. She reports worsening pain in the left knee. She c/o severe left knee pain and instability. The pain is constant. Pt had some concern with b/l LE neuropathy and was referred to neurology but she did not go. She was seen by vascular and using Venodyne boots 1 hour a day. Pt has DM.

## 2021-02-26 NOTE — PROCEDURE
[de-identified] : I injected the patient's left knee and right pes bursa today with cortisone.\par \par I discussed at length with the patient the planned steroid and lidocaine injection. The risks, benefits, convalescence and alternatives were reviewed. The possible side effects discussed included but were not limited to: pain, swelling, heat, bleeding, and redness. Symptoms are generally mild but if they are extensive then contact the office. Giving pain relievers by mouth such as NSAIDs or Tylenol can generally treat the reactions to steroid and lidocaine. Rare cases of infection have been noted. Rash, hives and itching may occur post injection. If you have muscle pain or cramps, flushing and or swelling of the face, rapid heart beat, nausea, dizziness, fever, chills, headache, difficulty breathing, swelling in the arms or legs, or have a prickly feeling of your skin, contact a health care provider immediately. Following this discussion, the knee was prepped with Alcohol and under sterile condition the 80 mg Depo-Medrol and 6 cc Lidocaine injection was performed with a 20 gauge needle through a superolateral injection site. The needle was introduced into the joint, aspiration was performed to ensure intra-articular placement and the medication was injected. Upon withdrawal of the needle the site was cleaned with alcohol and a band aid applied. The patient tolerated the injection well and there were no adverse effects. Post injection instructions included no strenuous activity for 24 hours, cryotherapy and if there are any adverse effects to contact the office.

## 2021-02-26 NOTE — PHYSICAL EXAM
[UE/LE] : Sensory: Intact in bilateral upper & lower extremities [ALL] : dorsalis pedis, posterior tibial, femoral, popliteal, and radial 2+ and symmetric bilaterally [Normal] : Alert and in no acute distress [Antalgic] : not antalgic [Apractic] : not apractic [Ataxic] : not ataxic [Coxalgic] : not coxalgic [Poor Appearance] : well-appearing [de-identified] : GENERAL APPEARANCE: Well nourished and hydrated, pleasant, alert, and oriented x 3. Appears their stated age. \par HEENT: Normocephalic, extraocular eye motion intact. Nasal septum midline. Oral cavity clear. External auditory canal clear. \par RESPIRATORY: Breath sounds clear and audible in all lobes. No wheezing, No accessory muscle use.\par CARDIOVASCULAR: No apparent abnormalities. No lower leg edema. No varicosities. Pedal pulses are palpable.\par NEUROLOGIC: Sensation is normal, no muscle weakness in the upper or lower extremities.\par DERMATOLOGIC: No apparent skin lesions, moist, warm, no rash.\par SPINE: Cervical spine appears normal and moves freely; thoracic spine appears normal and moves freely; lumbosacral spine appears normal and moves freely, normal, nontender.\par MUSCULOSKELETAL: Hands, wrists, and elbows are normal and move freely, shoulders are normal and move freely. \par Musculoskeletal: Gait: normal and not antalgic .  [de-identified] : Examination of right knee demonstrates healed midline incision, no obvious signs of infection, no joint effusion, no erythema, smooth and active full range of motion without pain 0- 120 degrees, tenderness over the pes anserine bursa. \par Left knee examination shows varus alignment, medial joint line tenderness and pain with palpation, crepitus with range of motion.

## 2021-02-26 NOTE — DISCUSSION/SUMMARY
[Surgical risks reviewed] : Surgical risks reviewed [de-identified] : 80 year old female with with end-stage medial compartment osteoarthritis of the left knee. She is a candidate for left TKA, but wishes to continue with conservative treatment. Pt does have MHx of DM. We will need to f/u with her HbA1c should she wish to pursue surgery in the future. Pt understands that her A1c level must be below 8.0 prior to surgery. She elected to receive a cortisone injection in her left knee which she tolerated well. Pt is thinking about pursuing surgery in Spring 2021. She is also s/p right TKA DOS 4/7/2015 with pes bursitis of the right knee. We reassured the pt that her hardware is in good positioning with no evidence of wear, loosening, or subsidence. She has responded well to right knee pes bursa cortisone injections in the past and she elected to receive repeat injection today. F/u with us in three months for repeat cortisone injections if needed. \par \par \par The patient is a 80 year individual with end stage arthritis of their left knee joint. Based upon the patient's continued symptoms and failure to respond to conservative treatment I have recommended a left total knee arthroplasty for this patient. A long discussion took place with the patient describing what a total joint replacement is and what the procedure would entail. A total knee arthroplasty model, similar to the implant that was used during the operation, was utilized to demonstrate and to discuss the various bearing surfaces of the implants. The hospitalization and post-operative care and rehabilitation were also discussed. The use of perioperative antibiotics and DVT prophylaxis were discussed. The risk, benefits and alternatives to a surgical intervention were discussed at length with the patient. The patient was also advised of risks related to the medical comorbidities, elevated body mass index (BMI), and smoking where applicable. We discussed how to reduce modifiable risk factors and encouraged smoking cessation were applicable.. A lengthy discussion took place to review the most common complications including but not limited to: deep vein thrombosis, pulmonary embolus, heart attack, stroke, infection, wound breakdown, numbness, damage to nerves, tendon, muscles, arteries or other blood vessels, death and other possible complications from anesthesia. The patient was told that we will take steps to minimize these risks by using sterile technique, antibiotics and DVT prophylaxis when appropriate and follow the patient postoperatively in the office setting to monitor progress. The possibility of recurrent pain, no improvement in pain and actual worsening of pain were also discussed with the patient.\par The discharge plan of care focused on the patient going home following surgery. The patient was encouraged to make the necessary arrangements to have someone stay with them when they are discharged home. Following discharge, a home care nurse was to the patient. The home care nurse would open the patient’s home care case and request home physical therapy services. Home physical therapy was to commence following discharge provided it was appropriate and covered by the health insurance benefit plan. \par The benefits of surgery were discussed with the patient including the potential for improving her current clinical condition through operative intervention. Alternatives to surgical intervention including continued conservative management were also discussed in detail. All questions were answered to the satisfaction of the patient. The treatment plan of care, as well as a model of a total knee arthroplasty equivalent to the one that will be used for their total joint replacement, was shared with the patient. The patient agreed to the plan of care as well as the use of implants in their total joint replacement.

## 2021-02-26 NOTE — DISCUSSION/SUMMARY
[Surgical risks reviewed] : Surgical risks reviewed [de-identified] : 80 year old female with with end-stage medial compartment osteoarthritis of the left knee. She is a candidate for left TKA, but wishes to continue with conservative treatment. Pt does have MHx of DM. We will need to f/u with her HbA1c should she wish to pursue surgery in the future. Pt understands that her A1c level must be below 8.0 prior to surgery. She elected to receive a cortisone injection in her left knee which she tolerated well. Pt is thinking about pursuing surgery in Spring 2021. She is also s/p right TKA DOS 4/7/2015 with pes bursitis of the right knee. We reassured the pt that her hardware is in good positioning with no evidence of wear, loosening, or subsidence. She has responded well to right knee pes bursa cortisone injections in the past and she elected to receive repeat injection today. F/u with us in three months for repeat cortisone injections if needed. \par \par \par The patient is a 80 year individual with end stage arthritis of their left knee joint. Based upon the patient's continued symptoms and failure to respond to conservative treatment I have recommended a left total knee arthroplasty for this patient. A long discussion took place with the patient describing what a total joint replacement is and what the procedure would entail. A total knee arthroplasty model, similar to the implant that was used during the operation, was utilized to demonstrate and to discuss the various bearing surfaces of the implants. The hospitalization and post-operative care and rehabilitation were also discussed. The use of perioperative antibiotics and DVT prophylaxis were discussed. The risk, benefits and alternatives to a surgical intervention were discussed at length with the patient. The patient was also advised of risks related to the medical comorbidities, elevated body mass index (BMI), and smoking where applicable. We discussed how to reduce modifiable risk factors and encouraged smoking cessation were applicable.. A lengthy discussion took place to review the most common complications including but not limited to: deep vein thrombosis, pulmonary embolus, heart attack, stroke, infection, wound breakdown, numbness, damage to nerves, tendon, muscles, arteries or other blood vessels, death and other possible complications from anesthesia. The patient was told that we will take steps to minimize these risks by using sterile technique, antibiotics and DVT prophylaxis when appropriate and follow the patient postoperatively in the office setting to monitor progress. The possibility of recurrent pain, no improvement in pain and actual worsening of pain were also discussed with the patient.\par The discharge plan of care focused on the patient going home following surgery. The patient was encouraged to make the necessary arrangements to have someone stay with them when they are discharged home. Following discharge, a home care nurse was to the patient. The home care nurse would open the patient’s home care case and request home physical therapy services. Home physical therapy was to commence following discharge provided it was appropriate and covered by the health insurance benefit plan. \par The benefits of surgery were discussed with the patient including the potential for improving her current clinical condition through operative intervention. Alternatives to surgical intervention including continued conservative management were also discussed in detail. All questions were answered to the satisfaction of the patient. The treatment plan of care, as well as a model of a total knee arthroplasty equivalent to the one that will be used for their total joint replacement, was shared with the patient. The patient agreed to the plan of care as well as the use of implants in their total joint replacement.

## 2021-03-08 ENCOUNTER — APPOINTMENT (OUTPATIENT)
Dept: FAMILY MEDICINE | Facility: CLINIC | Age: 81
End: 2021-03-08
Payer: MEDICARE

## 2021-03-08 VITALS
SYSTOLIC BLOOD PRESSURE: 122 MMHG | HEIGHT: 63 IN | DIASTOLIC BLOOD PRESSURE: 70 MMHG | WEIGHT: 265 LBS | HEART RATE: 68 BPM | BODY MASS INDEX: 46.95 KG/M2

## 2021-03-08 DIAGNOSIS — Z87.09 PERSONAL HISTORY OF OTHER DISEASES OF THE RESPIRATORY SYSTEM: ICD-10-CM

## 2021-03-08 DIAGNOSIS — R79.89 OTHER SPECIFIED ABNORMAL FINDINGS OF BLOOD CHEMISTRY: ICD-10-CM

## 2021-03-08 PROCEDURE — 99072 ADDL SUPL MATRL&STAF TM PHE: CPT

## 2021-03-08 PROCEDURE — 99214 OFFICE O/P EST MOD 30 MIN: CPT

## 2021-03-08 NOTE — PHYSICAL EXAM
[No Acute Distress] : no acute distress [Well Nourished] : well nourished [Well-Appearing] : well-appearing [Normal Sclera/Conjunctiva] : normal sclera/conjunctiva [PERRL] : pupils equal round and reactive to light [No Lymphadenopathy] : no lymphadenopathy [Supple] : supple [No Respiratory Distress] : no respiratory distress  [No Accessory Muscle Use] : no accessory muscle use [Clear to Auscultation] : lungs were clear to auscultation bilaterally [Normal Rate] : normal rate  [Regular Rhythm] : with a regular rhythm [Normal S1, S2] : normal S1 and S2 [Soft] : abdomen soft [Non Tender] : non-tender [Normal Bowel Sounds] : normal bowel sounds [Speech Grossly Normal] : speech grossly normal [Memory Grossly Normal] : memory grossly normal [Normal Mood] : the mood was normal

## 2021-03-08 NOTE — HISTORY OF PRESENT ILLNESS
[FreeTextEntry1] : F/U HTN, DEPRESSION [de-identified] : MS. MERIDA IS A PLEASANT 81 YO PRESENTING FOR FOLLOW-UP.  SHE HAS A HISTORY OF HYPERTENSION, HYPERLIPIDEMIA, NON-ISCHEMIC CARDIOMYOPATHY, HYPOTHYROIDISM, DIABETES, DEPRESSION AND OBESITY.  HAD BREAST BIOPSY ON LEFT - BENIGN FINDINGS.  HAS BEEN ACTIVELY FOLLOWING UP WITH ORTHOPEDICS AND GETTING SHOTS IN KNEES - HELP WITH PAIN FOR A GOOD MONTH.  BLOOD PRESSURE CONTROLLED ON METOPROLOL AND ON LIPITOR FOR CHOLESTEROL.  STATES SHE IS FEELING WELL ON LEXAPRO.  NOT DOWN OR DEPRESSED.  DOES MISS SPENDING TIME WITH FAMILY DUE TO COVID PANDEMIC.  THYROID AND DIABETES UNDER THE CARE OF ENDOCRINOLOGY.  ROUTINELY FOLLOWING UP WITH CARDIOLOGY.  WAS TREATED FOR SINUS INFECTION AROUND THE TIME OF LAB WORK INCLUDING CBC.  ROUTINE PULMONARY FOLLOW-UP.  ILD NOT FELT TO BE SIGNIFICANT.  BEING TREATED FOR JAVON.

## 2021-04-02 ENCOUNTER — LABORATORY RESULT (OUTPATIENT)
Age: 81
End: 2021-04-02

## 2021-04-05 LAB
ALBUMIN SERPL ELPH-MCNC: 4.2 G/DL
ALP BLD-CCNC: 83 U/L
ALT SERPL-CCNC: 18 U/L
ANION GAP SERPL CALC-SCNC: 10 MMOL/L
AST SERPL-CCNC: 24 U/L
BASOPHILS # BLD AUTO: 0.13 K/UL
BASOPHILS NFR BLD AUTO: 1.4 %
BILIRUB SERPL-MCNC: <0.2 MG/DL
BUN SERPL-MCNC: 23 MG/DL
CALCIUM SERPL-MCNC: 10.3 MG/DL
CHLORIDE SERPL-SCNC: 105 MMOL/L
CO2 SERPL-SCNC: 26 MMOL/L
CREAT SERPL-MCNC: 1.14 MG/DL
EOSINOPHIL # BLD AUTO: 0.3 K/UL
EOSINOPHIL NFR BLD AUTO: 3.3 %
GLUCOSE SERPL-MCNC: 114 MG/DL
HCT VFR BLD CALC: 43.3 %
HGB BLD-MCNC: 12.9 G/DL
IMM GRANULOCYTES NFR BLD AUTO: 0.2 %
LYMPHOCYTES # BLD AUTO: 3 K/UL
LYMPHOCYTES NFR BLD AUTO: 33.3 %
MAN DIFF?: NORMAL
MCHC RBC-ENTMCNC: 29.3 PG
MCHC RBC-ENTMCNC: 29.8 GM/DL
MCV RBC AUTO: 98.4 FL
MONOCYTES # BLD AUTO: 0.55 K/UL
MONOCYTES NFR BLD AUTO: 6.1 %
NEUTROPHILS # BLD AUTO: 5.02 K/UL
NEUTROPHILS NFR BLD AUTO: 55.7 %
PLATELET # BLD AUTO: 335 K/UL
POTASSIUM SERPL-SCNC: 5.5 MMOL/L
PROT SERPL-MCNC: 6.7 G/DL
RBC # BLD: 4.4 M/UL
RBC # FLD: 14.4 %
SODIUM SERPL-SCNC: 140 MMOL/L
WBC # FLD AUTO: 9.02 K/UL

## 2021-04-07 LAB
ANION GAP SERPL CALC-SCNC: 14 MMOL/L
BUN SERPL-MCNC: 29 MG/DL
CALCIUM SERPL-MCNC: 10.3 MG/DL
CHLORIDE SERPL-SCNC: 101 MMOL/L
CO2 SERPL-SCNC: 26 MMOL/L
CREAT SERPL-MCNC: 1.19 MG/DL
GLUCOSE SERPL-MCNC: 143 MG/DL
POTASSIUM SERPL-SCNC: 4.9 MMOL/L
SODIUM SERPL-SCNC: 141 MMOL/L

## 2021-04-08 ENCOUNTER — APPOINTMENT (OUTPATIENT)
Dept: ENDOCRINOLOGY | Facility: CLINIC | Age: 81
End: 2021-04-08
Payer: MEDICARE

## 2021-04-08 VITALS — DIASTOLIC BLOOD PRESSURE: 80 MMHG | SYSTOLIC BLOOD PRESSURE: 132 MMHG

## 2021-04-08 PROCEDURE — 99072 ADDL SUPL MATRL&STAF TM PHE: CPT

## 2021-04-08 PROCEDURE — 99214 OFFICE O/P EST MOD 30 MIN: CPT

## 2021-04-08 NOTE — PHYSICAL EXAM
[Alert] : alert [Well Nourished] : well nourished [No Acute Distress] : no acute distress [Well Developed] : well developed [Normal Sclera/Conjunctiva] : normal sclera/conjunctiva [EOMI] : extra ocular movement intact [No Proptosis] : no proptosis [Thyroid Not Enlarged] : the thyroid was not enlarged [No Thyroid Nodules] : no palpable thyroid nodules [No Respiratory Distress] : no respiratory distress [No Accessory Muscle Use] : no accessory muscle use [Clear to Auscultation] : lungs were clear to auscultation bilaterally [Normal S1, S2] : normal S1 and S2 [Normal Rate] : heart rate was normal [Regular Rhythm] : with a regular rhythm [Normal Anterior Cervical Nodes] : no anterior cervical lymphadenopathy [No Rash] : no rash [No Tremors] : no tremors [Oriented x3] : oriented to person, place, and time [Normal Affect] : the affect was normal [Normal Mood] : the mood was normal [Acanthosis Nigricans] : no acanthosis nigricans [de-identified] : B/L LE edema, left > right

## 2021-04-08 NOTE — HISTORY OF PRESENT ILLNESS
[FreeTextEntry1] : Type: 2\par Severity: moderate\par Duration: 10+ years\par \par Associated symptoms-\par Last eye exam: August 2020, no DR per patient.\par Last foot exam: in office August 2020. Denies pain/numbness/tingling in B/L feet.\par Kidney disease: CKD\par Heart disease: none\par \par Modifying factors-\par Current meds for glycemic control:\par Metformin 1000 mg BID\par Glimepiride 2 mg QD with dinner\par \par Diet: 2-3 meals daily, snacks between meals. No eating after dinner.\par Weight: stable\par Exercise: none due to knee pain\par \par SMBG once per week, tests at varying times of the day. Reports BG is usually 115 to 130s.\par ======================================\par Hypothyroidism\par LT4 50 mcg daily, takes appropriately\par

## 2021-04-08 NOTE — ASSESSMENT
[FreeTextEntry1] : 80 year old female with T2DM, hypertension, hyperlipidemia, hypothyroidism, thyroid nodule, and osteoporosis. Glycemic control is acceptable.\par \par 1. T2DM- A1c 6.3%.\par -Continue Metformin and Glimepiride.\par -Continue SMBG at variable times.\par -Stressed importance of diet, exercise, and weight loss to maintain glycemic control.\par -Repeat A1c in 3 months.\par \par 2. Hypertension- improved.\par -Continue current regimen.\par -Repeat TORIE with next labs.\par -Allergy to ACE inhibitor.\par \par 3. Hyperlipidemia- LDL at target.\par -Continue statin.\par -Repeat lipids in 3 months. \par \par 4. Hypothyroid- euthyroid on replacement T4.\par -Continue current dose of LT4.\par -Repeat TFTs in 3 months.\par \par 5. NTMNG s/p nondiagnostic FNA of left 1.3 cm dominant nodule, likely due to rim calcification.\par -Repeat sonogram now.\par \par 6. Osteoporosis\par -Continue vitamin D and MVI.\par -Encouraged weight bearing exercises.\par -Repeat DEXA May 2022. \par

## 2021-04-08 NOTE — REVIEW OF SYSTEMS
[SOB on Exertion] : shortness of breath on exertion [Polyuria] : polyuria [Nocturia] : nocturia [Joint Pain] : joint pain [Polydipsia] : polydipsia [Recent Weight Gain (___ Lbs)] : no recent weight gain [Recent Weight Loss (___ Lbs)] : no recent weight loss [Blurred Vision] : no blurred vision [Dysphagia] : no dysphagia [Neck Pain] : no neck pain [Chest Pain] : no chest pain [Nausea] : no nausea [Constipation] : no constipation [Abdominal Pain] : no abdominal pain [Vomiting] : no vomiting [Diarrhea] : no diarrhea [Tremors] : no tremors [Pain/Numbness of Digits] : no pain/numbness of digits [Depression] : no depression [Anxiety] : no anxiety

## 2021-05-26 ENCOUNTER — APPOINTMENT (OUTPATIENT)
Dept: ORTHOPEDIC SURGERY | Facility: CLINIC | Age: 81
End: 2021-05-26
Payer: MEDICARE

## 2021-05-26 VITALS
DIASTOLIC BLOOD PRESSURE: 83 MMHG | SYSTOLIC BLOOD PRESSURE: 133 MMHG | WEIGHT: 265 LBS | HEIGHT: 63 IN | BODY MASS INDEX: 46.95 KG/M2 | HEART RATE: 72 BPM

## 2021-05-26 PROCEDURE — 99214 OFFICE O/P EST MOD 30 MIN: CPT | Mod: 25

## 2021-05-26 PROCEDURE — 20610 DRAIN/INJ JOINT/BURSA W/O US: CPT

## 2021-05-26 NOTE — PHYSICAL EXAM
[UE/LE] : Sensory: Intact in bilateral upper & lower extremities [ALL] : dorsalis pedis, posterior tibial, femoral, popliteal, and radial 2+ and symmetric bilaterally [Normal] : Alert and in no acute distress [Antalgic] : not antalgic [Apractic] : not apractic [Ataxic] : not ataxic [Coxalgic] : not coxalgic [Poor Appearance] : well-appearing [de-identified] : GENERAL APPEARANCE: Well nourished and hydrated, pleasant, alert, and oriented x 3. Appears their stated age. \par HEENT: Normocephalic, extraocular eye motion intact. Nasal septum midline. Oral cavity clear. External auditory canal clear. \par RESPIRATORY: Breath sounds clear and audible in all lobes. No wheezing, No accessory muscle use.\par CARDIOVASCULAR: No apparent abnormalities. No lower leg edema. No varicosities. Pedal pulses are palpable.\par NEUROLOGIC: Sensation is normal, no muscle weakness in the upper or lower extremities.\par DERMATOLOGIC: No apparent skin lesions, moist, warm, no rash.\par SPINE: Cervical spine appears normal and moves freely; thoracic spine appears normal and moves freely; lumbosacral spine appears normal and moves freely, normal, nontender.\par MUSCULOSKELETAL: Hands, wrists, and elbows are normal and move freely, shoulders are normal and move freely. \par Musculoskeletal: Gait: normal and not antalgic .  [de-identified] : Examination of right knee demonstrates healed midline incision, no obvious signs of infection, no joint effusion, no erythema, smooth and active full range of motion without pain 0- 120 degrees, tenderness over the pes anserine bursa. \par Left knee examination shows varus alignment, medial joint line tenderness and pain with palpation, crepitus with range of motion.

## 2021-05-26 NOTE — END OF VISIT
[FreeTextEntry3] : I, Daryl Merlos, acted solely as a scribe for Dr. Nicholas Davidson on this date 05/26/2021.

## 2021-05-26 NOTE — PROCEDURE
[de-identified] : I injected the patient's left knee and right pes bursa today with cortisone.\par \par I discussed at length with the patient the planned steroid and lidocaine injection. The risks, benefits, convalescence and alternatives were reviewed. The possible side effects discussed included but were not limited to: pain, swelling, heat, bleeding, and redness. Symptoms are generally mild but if they are extensive then contact the office. Giving pain relievers by mouth such as NSAIDs or Tylenol can generally treat the reactions to steroid and lidocaine. Rare cases of infection have been noted. Rash, hives and itching may occur post injection. If you have muscle pain or cramps, flushing and or swelling of the face, rapid heart beat, nausea, dizziness, fever, chills, headache, difficulty breathing, swelling in the arms or legs, or have a prickly feeling of your skin, contact a health care provider immediately. Following this discussion, the knee was prepped with Alcohol and under sterile condition the 80 mg Depo-Medrol and 6 cc Lidocaine injection was performed with a 20 gauge needle through a superolateral injection site. The needle was introduced into the joint, aspiration was performed to ensure intra-articular placement and the medication was injected. Upon withdrawal of the needle the site was cleaned with alcohol and a band aid applied. The patient tolerated the injection well and there were no adverse effects. Post injection instructions included no strenuous activity for 24 hours, cryotherapy and if there are any adverse effects to contact the office.

## 2021-05-26 NOTE — DISCUSSION/SUMMARY
[Surgical risks reviewed] : Surgical risks reviewed [de-identified] : 80 year old female with with end-stage medial compartment osteoarthritis of the left knee. The patient is a candidate for a left TKA; however, she would like to continue to exhaust conservative therapies, specifically cortisone injections. If she decides to pursue surgery in the future, she will require an updated A1c due to her known medical hx of DM. Pt understands that her A1c level must be below 8.0 prior to surgery. Pt opted to receive a cortisone injection in the left knee today and tolerated it well. In regards to the right knee, she is s/p right TKA DOS 4/7/2015 with pes bursitis. She has been responding well with intermittent cortisone injections. She opted to repeat cortisone injection in the right pes bursa today which she tolerated well. F/u with us in three months for repeat cortisone injections if needed. In regards to her vertigo symptoms, we encourage her to consult an ENT.\par \par The patient is a 80 year individual with end stage arthritis of their left knee joint. Based upon the patient's continued symptoms and failure to respond to conservative treatment I have recommended a left total knee arthroplasty for this patient. A long discussion took place with the patient describing what a total joint replacement is and what the procedure would entail. A total knee arthroplasty model, similar to the implant that was used during the operation, was utilized to demonstrate and to discuss the various bearing surfaces of the implants. The hospitalization and post-operative care and rehabilitation were also discussed. The use of perioperative antibiotics and DVT prophylaxis were discussed. The risk, benefits and alternatives to a surgical intervention were discussed at length with the patient. The patient was also advised of risks related to the medical comorbidities, elevated body mass index (BMI), and smoking where applicable. We discussed how to reduce modifiable risk factors and encouraged smoking cessation were applicable.. A lengthy discussion took place to review the most common complications including but not limited to: deep vein thrombosis, pulmonary embolus, heart attack, stroke, infection, wound breakdown, numbness, damage to nerves, tendon, muscles, arteries or other blood vessels, death and other possible complications from anesthesia. The patient was told that we will take steps to minimize these risks by using sterile technique, antibiotics and DVT prophylaxis when appropriate and follow the patient postoperatively in the office setting to monitor progress. The possibility of recurrent pain, no improvement in pain and actual worsening of pain were also discussed with the patient.\par The discharge plan of care focused on the patient going home following surgery. The patient was encouraged to make the necessary arrangements to have someone stay with them when they are discharged home. Following discharge, a home care nurse was to the patient. The home care nurse would open the patient’s home care case and request home physical therapy services. Home physical therapy was to commence following discharge provided it was appropriate and covered by the health insurance benefit plan. \par The benefits of surgery were discussed with the patient including the potential for improving her current clinical condition through operative intervention. Alternatives to surgical intervention including continued conservative management were also discussed in detail. All questions were answered to the satisfaction of the patient. The treatment plan of care, as well as a model of a total knee arthroplasty equivalent to the one that will be used for their total joint replacement, was shared with the patient. The patient agreed to the plan of care as well as the use of implants in their total joint replacement.

## 2021-05-26 NOTE — HISTORY OF PRESENT ILLNESS
[Pain Location] : pain [Stable] : stable [___ yrs] : [unfilled] year(s) ago [7] : a current pain level of 7/10 [8] : a maximum pain level of 8/10 [Standing] : standing [Constant] : ~He/She~ states the symptoms seem to be constant [Walking] : worsened by walking [Knee Flexion] : worsened with knee flexion [Knee Extension] : worsened with knee extension [Rest] : relieved by rest [de-identified] : 80 year old obese female here for f/u of bilateral knee pain where the left knee pain is worse than the right knee pain. She is s/p right TKA DOS: 4/7/2015. The patient has responded well with cortisone injections in the left knee and right knee pes bursa. The left knee pain is constant and reports instability. She only has right knee pain with direct pressure. Pt had some concern with b/l LE neuropathy and was referred to neurology but she did not go. She was seen by vascular and using Venodyne boots 1 hour a day. Pt has DM. Pt has concern about vertigo.

## 2021-06-07 ENCOUNTER — APPOINTMENT (OUTPATIENT)
Dept: ENDOCRINOLOGY | Facility: CLINIC | Age: 81
End: 2021-06-07

## 2021-06-17 ENCOUNTER — APPOINTMENT (OUTPATIENT)
Dept: FAMILY MEDICINE | Facility: CLINIC | Age: 81
End: 2021-06-17
Payer: MEDICARE

## 2021-06-17 VITALS
OXYGEN SATURATION: 95 % | HEIGHT: 63 IN | DIASTOLIC BLOOD PRESSURE: 66 MMHG | WEIGHT: 171 LBS | BODY MASS INDEX: 30.3 KG/M2 | HEART RATE: 65 BPM | SYSTOLIC BLOOD PRESSURE: 154 MMHG

## 2021-06-17 VITALS — SYSTOLIC BLOOD PRESSURE: 112 MMHG | DIASTOLIC BLOOD PRESSURE: 72 MMHG

## 2021-06-17 DIAGNOSIS — R42 DIZZINESS AND GIDDINESS: ICD-10-CM

## 2021-06-17 PROCEDURE — 99214 OFFICE O/P EST MOD 30 MIN: CPT

## 2021-06-17 NOTE — REVIEW OF SYSTEMS
[Fatigue] : fatigue [Negative] : Gastrointestinal [Fever] : no fever [Chills] : no chills [Suicidal] : not suicidal [Depression] : no depression

## 2021-06-17 NOTE — HISTORY OF PRESENT ILLNESS
[FreeTextEntry1] : f/u hypothyroidism, vertigo [de-identified] : MS. MERIDA IS A PLEASANT 81 YO PRESENTING FOR FOLLOW-UP. SHE HAS A HISTORY OF HYPERTENSION, HYPERLIPIDEMIA, NON-ISCHEMIC CARDIOMYOPATHY, HYPOTHYROIDISM, DIABETES, DEPRESSION AND OBESITY.  SAW ENT LAST WEEK DUE TO DIZZINESS.  WAS DIAGNOSED WITH VERTIGO.  TO FOLLOW-UP AGAIN ON WEDNESDAY.  HAS NEVER GONE FOR VESTIBULAR THERAPY.  SOME IMPROVEMENT IN SYMPTOMS.  SAW ORTHOPEDICS AND HAD BILATERAL KNEE INJECTIONS.  IS TO SEE OPHTHALMOLOGY YEARLY.  ON LEVOTHYROXINE DAILY WITHOUT MISSED DOSES.  FEELING WELL ON CURRENT DOSAGE.  FEELING OKAY ON LEXAPRO.  A LITTLE ANXIOUS AT TIMES BUT OVERALL OKAY.  NOT DOWN OR DEPRESSED.

## 2021-06-17 NOTE — PLAN
[FreeTextEntry1] :  DRIVING AT THIS TIME\par REFERRAL TO VESTIBULAR THERAPY\par FOLLOW-UP WITH ENT\par ENDOCRINOLOGY CONSULTANT NOTE APPRECIATED\par LAB WORK REVIEWED\par DOING WELL ON CURRENT DOSAGE OF LEVOTHYROXINE - WILL CONTINUE\par STRESS REDUCTION\par GOOD SUPPORT SYSTEM\par WILL CONTINUE LEXAPRO\par FOLLOW-UP ALL SPECIALISTS AS DIRECTED \par MAMMOGRAM REVIEWED; BREAST SURGERY\par CALL WITH ANY QUESTIONS, CONCERNS OR CHANGES

## 2021-07-07 ENCOUNTER — RX RENEWAL (OUTPATIENT)
Age: 81
End: 2021-07-07

## 2021-07-08 ENCOUNTER — APPOINTMENT (OUTPATIENT)
Dept: ENDOCRINOLOGY | Facility: CLINIC | Age: 81
End: 2021-07-08

## 2021-08-01 ENCOUNTER — RX RENEWAL (OUTPATIENT)
Age: 81
End: 2021-08-01

## 2021-08-04 LAB
BASOPHILS # BLD AUTO: 0.1 K/UL
BASOPHILS NFR BLD AUTO: 1.1 %
EOSINOPHIL # BLD AUTO: 0.44 K/UL
EOSINOPHIL NFR BLD AUTO: 5 %
HCT VFR BLD CALC: 41 %
HGB BLD-MCNC: 12.8 G/DL
IMM GRANULOCYTES NFR BLD AUTO: 0.3 %
LYMPHOCYTES # BLD AUTO: 2.82 K/UL
LYMPHOCYTES NFR BLD AUTO: 31.9 %
MAN DIFF?: NORMAL
MCHC RBC-ENTMCNC: 29.6 PG
MCHC RBC-ENTMCNC: 31.2 GM/DL
MCV RBC AUTO: 94.7 FL
MONOCYTES # BLD AUTO: 0.54 K/UL
MONOCYTES NFR BLD AUTO: 6.1 %
NEUTROPHILS # BLD AUTO: 4.9 K/UL
NEUTROPHILS NFR BLD AUTO: 55.6 %
PLATELET # BLD AUTO: 333 K/UL
RBC # BLD: 4.33 M/UL
RBC # FLD: 14.4 %
WBC # FLD AUTO: 8.83 K/UL

## 2021-08-05 LAB
25(OH)D3 SERPL-MCNC: 49.9 NG/ML
ALBUMIN SERPL ELPH-MCNC: 4.2 G/DL
ALP BLD-CCNC: 88 U/L
ALT SERPL-CCNC: 19 U/L
ANION GAP SERPL CALC-SCNC: 13 MMOL/L
AST SERPL-CCNC: 19 U/L
BILIRUB SERPL-MCNC: 0.3 MG/DL
BUN SERPL-MCNC: 19 MG/DL
CALCIUM SERPL-MCNC: 10.2 MG/DL
CHLORIDE SERPL-SCNC: 104 MMOL/L
CHOLEST SERPL-MCNC: 153 MG/DL
CO2 SERPL-SCNC: 23 MMOL/L
CREAT SERPL-MCNC: 1.18 MG/DL
ESTIMATED AVERAGE GLUCOSE: 140 MG/DL
GLUCOSE SERPL-MCNC: 145 MG/DL
HBA1C MFR BLD HPLC: 6.5 %
HDLC SERPL-MCNC: 47 MG/DL
LDLC SERPL CALC-MCNC: 75 MG/DL
NONHDLC SERPL-MCNC: 107 MG/DL
POTASSIUM SERPL-SCNC: 5.2 MMOL/L
PROT SERPL-MCNC: 7.1 G/DL
SODIUM SERPL-SCNC: 140 MMOL/L
T4 FREE SERPL-MCNC: 1.3 NG/DL
TRIGL SERPL-MCNC: 157 MG/DL
TSH SERPL-ACNC: 2.31 UIU/ML

## 2021-08-09 ENCOUNTER — RX RENEWAL (OUTPATIENT)
Age: 81
End: 2021-08-09

## 2021-08-09 ENCOUNTER — APPOINTMENT (OUTPATIENT)
Dept: ENDOCRINOLOGY | Facility: CLINIC | Age: 81
End: 2021-08-09
Payer: MEDICARE

## 2021-08-09 VITALS
BODY MASS INDEX: 44.82 KG/M2 | SYSTOLIC BLOOD PRESSURE: 126 MMHG | HEART RATE: 75 BPM | HEIGHT: 65 IN | WEIGHT: 269 LBS | OXYGEN SATURATION: 94 % | DIASTOLIC BLOOD PRESSURE: 76 MMHG

## 2021-08-09 PROCEDURE — 99214 OFFICE O/P EST MOD 30 MIN: CPT

## 2021-08-09 NOTE — ASSESSMENT
[Carbohydrate Consistent Diet] : carbohydrate consistent diet [Exercise/Effect on Glucose] : exercise/effect on glucose [Self Monitoring of Blood Glucose] : self monitoring of blood glucose [FreeTextEntry1] : Dm2  in patient with morbid obesity and HTN and HLD.  \par \par Dm2:  she declines checking any bgs.  \par advised to cut down on carbs but she declines  \par continue metformin 1000 mg BID \par continue glimepiride 2 mg qd \par CKD with diabetic nephropathy. GFR 44. \par Needs to try to have more protein for meals\par cecilia/c ratio normal. \par continue ASA \par eye exam every 6 mos. \par covid vaccine done \par referral to nephrology for monitoring and see if we can add ACEI / ARB but she declined \par \par NTMNG : check thyroid US .She does not got for studies \par s/p FNA LEFT  nodule 1.3 cm nondiagnostic nodule in [facility] 2018.\par Images revised and has rim calcifications that can lead to FNA being nondiagnostic results again. \par Also considering her age , we decided to monitor for now and repeat US in 6 mo.\par \par hypoT : pt euthyroid clinically and biochemically.\par continue LT4 50 mcg \par Take daily in AM on an empty stomach at least 30 minutes before eating or taking other medication.\par \par obesity: encouraged more exercise walking 30 min 3 x week but she declines \par \par HTN :  bp at target on meds. Continue current management.\par I advised low fat/low cholesterol diet, low salt diet, and weight loss\par \par HLD: LDL at target.\par low fat/low cholesterol diet and weight loss advised\par continue atorvastatin 20 mg qd \par \par osteoporosis:  DXA scan with normal BMD and increase bone mass and i suspect that's due to arthritic changes \par continue vitamin d  \par continue MVi daily \par \par

## 2021-08-31 ENCOUNTER — APPOINTMENT (OUTPATIENT)
Dept: ORTHOPEDIC SURGERY | Facility: CLINIC | Age: 81
End: 2021-08-31
Payer: MEDICARE

## 2021-08-31 VITALS
DIASTOLIC BLOOD PRESSURE: 68 MMHG | BODY MASS INDEX: 44.82 KG/M2 | WEIGHT: 269 LBS | HEIGHT: 65 IN | SYSTOLIC BLOOD PRESSURE: 146 MMHG | HEART RATE: 65 BPM

## 2021-08-31 DIAGNOSIS — M17.11 UNILATERAL PRIMARY OSTEOARTHRITIS, RIGHT KNEE: ICD-10-CM

## 2021-08-31 PROCEDURE — 99213 OFFICE O/P EST LOW 20 MIN: CPT

## 2021-08-31 NOTE — DISCUSSION/SUMMARY
[de-identified] : Surgical risks reviewed. 80 year old obese female with with end-stage medial compartment osteoarthritis of the left knee. She is a candidate for left TKA, but wishes to continue with conservative treatment. Pt does have MHx of DM we will need to F/U with HbA1c should she wish to pursue surgery in the future. \par She is also s/p right TKA DOS 4/7/2015 with pes bursitis of the right knee. She has responded well to right knee pes bursa cortisone injections in the past and she deferred repeat injection today. \par I provided small quantity of tramadol for pain. \par she would like to schedule her left TKA today. \par \par The patient has been counseled regarding the elevated risks associated with surgical complications in patients with a BMI> 35. The patient demonstrates an understanding of the increased risk. After a lengthy discussion, the patient agreed to make a coordinated effort at weight loss prior to surgical intervention. The patient understands our BMI policy and they will make a conscious effort to improve their BMI.\par \par \par The patient is a 80 year old individual with end stage arthritis of their left knee joint. Based upon the patient's continued symptoms and failure to respond to conservative treatment I have recommended a left knee replacement for this patient. A long discussion took place with the patient describing what a total joint replacement is and what the procedure would entail. A total knee model, similar to the implant that will be used during the operation, was utilized to demonstrate and to discuss the various bearing surfaces of the implants. The hospitalization and post-operative care and rehabilitation were also discussed. The use of perioperative antibiotics and DVT prophylaxis were discussed. The risk, benefits and alternatives to a surgical intervention were discussed at length with the patient. The patient was also advised of risks related to the medical comorbidities and elevated body mass index (BMI).  A lengthy discussion took place to review the most common complications including but not limited to: deep vein thrombosis, pulmonary embolus, heart attack, stroke, infection, wound breakdown, numbness, damage to nerves, tendon, muscles, arteries or other blood vessels, death and other possible complications from anesthesia. The patient was told that we will take steps to minimize these risks by using sterile technique, antibiotics and DVT prophylaxis when appropriate and follow the patient postoperatively in the office setting to monitor progress. The possibility of recurrent pain, no improvement in pain and actual worsening of pain were also discussed with the patient.\par The discharge plan of care focused on the patient going home following surgery.  The patient was encouraged to make the necessary arrangements to have someone stay with them when they are discharged home.  Following discharge, a home care nurse will visit the patient.  The home care nurse will open your home care case and request home physical therapy services.  Home physical therapy will commence following discharge provided it is appropriate and covered by the health insurance benefit plan. \par The benefits of surgery were discussed with the patient including the potential for improving his/her current clinical condition through operative intervention. Alternatives to surgical intervention including continued conservative management were also discussed in detail. All questions were answered to the satisfaction of the patient. The treatment plan of care, as well as a model of a total knee equivalent to the one that will be used for their total joint replacement, was shared with the patient.  The patient agreed to the plan of care as well as the use of implants in their total joint replacement.\par

## 2021-08-31 NOTE — PHYSICAL EXAM
[de-identified] : GENERAL APPEARANCE: Well nourished and hydrated, pleasant, alert, and oriented x 3. Appears their stated age. \par HEENT: Normocephalic, extraocular eye motion intact. Nasal septum midline. Oral cavity clear. External auditory canal clear. \par RESPIRATORY: Breath sounds clear and audible in all lobes. No wheezing, No accessory muscle use.\par CARDIOVASCULAR: No apparent abnormalities. No lower leg edema. No varicosities. Pedal pulses are palpable.\par NEUROLOGIC: Sensation is normal, no muscle weakness in the upper or lower extremities.\par DERMATOLOGIC: No apparent skin lesions, moist, warm, no rash.\par SPINE: Cervical spine appears normal and moves freely; thoracic spine appears normal and moves freely; lumbosacral spine appears normal and moves freely, normal, nontender.\par MUSCULOSKELETAL: Hands, wrists, and elbows are normal and move freely, shoulders are normal and move freely. \par Musculoskeletal: Gait: normal and not antalgic . Examination of right knee demonstrates healed midline incision, no obvious signs of infection, no joint effusion, no erythema, smooth and active full range of motion without pain 0- 120 degrees, tenderness over the pes anserine bursa. \par Left knee examination shows varus alignment, medial joint line tenderness and pain with palpation, crepitus with range of motion. \par  \par 5/5 motor strength in bilateral lower extremities. Sensory: Intact in bilateral lower extremities. DTRs: Biceps, brachioradialis, triceps, patellar, ankle and plantar 2+ and symmetric bilaterally. Pulses: dorsalis pedis, posterior tibial, femoral, popliteal, and radial 2+ and symmetric bilaterally. \par Constitutional: Alert and in no acute distress and obese, but well-appearing and not in acute distress.

## 2021-08-31 NOTE — HISTORY OF PRESENT ILLNESS
[Pain Location] : pain [Worsening] : worsening [___ yrs] : [unfilled] year(s) ago [7] : a current pain level of 7/10 [8] : a maximum pain level of 8/10 [Walking] : walking [de-identified] : 80 year old obese female here for f/u of bilateral knee pain, left worse then right. She is s/p right TKA DOS: 4/7/2015. Patient had cortisone injection to the left knee intraarticular and right knee pes bursa August 2020 with good relief. She c/o severe left knee pain and instability. The pain is constant. Pt had some concern with b/l LE neuropathy and was referred to neurology but she did not go. She was seen by vascular and using Venodyne boots 1 hour a day. Pt has DM. \par she report 3 weeks relief with left knee cortisone injection. she reports worsening pain in the left knee\par she had few pes bursa injection of right knee in the past\par right knee pain is only with direct pressure or touch. \par \par she states " time to have left TKA"

## 2021-09-09 ENCOUNTER — NON-APPOINTMENT (OUTPATIENT)
Age: 81
End: 2021-09-09

## 2021-09-13 ENCOUNTER — MED ADMIN CHARGE (OUTPATIENT)
Age: 81
End: 2021-09-13

## 2021-09-13 ENCOUNTER — APPOINTMENT (OUTPATIENT)
Dept: CARDIOLOGY | Facility: CLINIC | Age: 81
End: 2021-09-13
Payer: MEDICARE

## 2021-09-13 PROCEDURE — 93306 TTE W/DOPPLER COMPLETE: CPT

## 2021-09-13 RX ORDER — PERFLUTREN 6.52 MG/ML
6.52 INJECTION, SUSPENSION INTRAVENOUS
Qty: 2 | Refills: 0 | Status: COMPLETED | OUTPATIENT
Start: 2021-09-13

## 2021-09-13 RX ADMIN — PERFLUTREN MG/ML: 6.52 INJECTION, SUSPENSION INTRAVENOUS at 00:00

## 2021-09-15 ENCOUNTER — APPOINTMENT (OUTPATIENT)
Dept: FAMILY MEDICINE | Facility: CLINIC | Age: 81
End: 2021-09-15
Payer: MEDICARE

## 2021-09-15 VITALS
WEIGHT: 274 LBS | HEART RATE: 70 BPM | DIASTOLIC BLOOD PRESSURE: 62 MMHG | SYSTOLIC BLOOD PRESSURE: 120 MMHG | BODY MASS INDEX: 45.65 KG/M2 | HEIGHT: 65 IN

## 2021-09-15 DIAGNOSIS — Z01.818 ENCOUNTER FOR OTHER PREPROCEDURAL EXAMINATION: ICD-10-CM

## 2021-09-15 DIAGNOSIS — Z23 ENCOUNTER FOR IMMUNIZATION: ICD-10-CM

## 2021-09-15 PROCEDURE — 99214 OFFICE O/P EST MOD 30 MIN: CPT | Mod: 25

## 2021-09-15 PROCEDURE — G0008: CPT

## 2021-09-15 PROCEDURE — 90662 IIV NO PRSV INCREASED AG IM: CPT

## 2021-09-15 NOTE — PHYSICAL EXAM
[General Appearance - Well Developed] : well developed [Normal Appearance] : normal appearance [Well Groomed] : well groomed [General Appearance - Well Nourished] : well nourished [General Appearance - In No Acute Distress] : no acute distress [Normal Conjunctiva] : the conjunctiva exhibited no abnormalities [No Oral Pallor] : no oral pallor [No Oral Cyanosis] : no oral cyanosis [Normal Jugular Venous A Waves Present] : normal jugular venous A waves present [Normal Jugular Venous V Waves Present] : normal jugular venous V waves present [Respiration, Rhythm And Depth] : normal respiratory rhythm and effort [Exaggerated Use Of Accessory Muscles For Inspiration] : no accessory muscle use [Auscultation Breath Sounds / Voice Sounds] : lungs were clear to auscultation bilaterally [Heart Rate And Rhythm] : heart rate and rhythm were normal [Heart Sounds] : normal S1 and S2 [Murmurs] : no murmurs present [Arterial Pulses Normal] : the arterial pulses were normal [Abdomen Soft] : soft [Abdomen Tenderness] : non-tender [Abnormal Walk] : normal gait [Nail Clubbing] : no clubbing of the fingernails [Cyanosis, Localized] : no localized cyanosis [Skin Turgor] : normal skin turgor [] : no rash [Oriented To Time, Place, And Person] : oriented to person, place, and time [Impaired Insight] : insight and judgment were intact [Affect] : the affect was normal [Memory Recent] : recent memory was not impaired [FreeTextEntry1] : L> R bilateral edema with chronic varicose veins

## 2021-09-15 NOTE — HISTORY OF PRESENT ILLNESS
[FreeTextEntry1] : 80 year old female with a history of DM, HTN, HLD, LBBB, JAVON with CPAP, obesity, and hypothyroidism. She presents for routine follow up. She has chronic dyspnea on exertion, which she states is unchanged. She denies chest pain, PND, fatigue, palpitations, syncope or near syncope. She has chronic b/l LE edema L>R, follows with Dr. Bartholomew and uses compression stockings and unna boot at times. She does c/o intermittent L leg pain. LHC on 7/15/16 revealed normal coronaries. NST done  6/2019 showed no ischemia and LVEF 40%. TTE done 5/2019 LVEF 49%, mild MR and mild PAH.  She has seen Dr. White: CXR, D dimer and CTA chest - no evidence of PE and lungs clear.   Denies chest pain, shortness of breath at rest, palpitations, syncope or near syncope, orthopnea and PND. Compliant with medications. Has L ankle edema that is associated with significant varicose veins.   Uses CPAP device regularly for JAVON.  She is going to need a left TKR in the future.

## 2021-09-15 NOTE — PHYSICAL EXAM
[No Acute Distress] : no acute distress [Well Nourished] : well nourished [Well-Appearing] : well-appearing [Normal Sclera/Conjunctiva] : normal sclera/conjunctiva [PERRL] : pupils equal round and reactive to light

## 2021-09-21 ENCOUNTER — OUTPATIENT (OUTPATIENT)
Dept: OUTPATIENT SERVICES | Facility: HOSPITAL | Age: 81
LOS: 1 days | End: 2021-09-21
Payer: MEDICARE

## 2021-09-21 VITALS
WEIGHT: 277.78 LBS | HEART RATE: 73 BPM | RESPIRATION RATE: 20 BRPM | SYSTOLIC BLOOD PRESSURE: 140 MMHG | TEMPERATURE: 97 F | OXYGEN SATURATION: 95 % | HEIGHT: 65 IN | DIASTOLIC BLOOD PRESSURE: 80 MMHG

## 2021-09-21 DIAGNOSIS — Z90.710 ACQUIRED ABSENCE OF BOTH CERVIX AND UTERUS: Chronic | ICD-10-CM

## 2021-09-21 DIAGNOSIS — Z98.89 OTHER SPECIFIED POSTPROCEDURAL STATES: Chronic | ICD-10-CM

## 2021-09-21 DIAGNOSIS — Z98.49 CATARACT EXTRACTION STATUS, UNSPECIFIED EYE: Chronic | ICD-10-CM

## 2021-09-21 DIAGNOSIS — Z90.49 ACQUIRED ABSENCE OF OTHER SPECIFIED PARTS OF DIGESTIVE TRACT: Chronic | ICD-10-CM

## 2021-09-21 DIAGNOSIS — Z96.651 PRESENCE OF RIGHT ARTIFICIAL KNEE JOINT: Chronic | ICD-10-CM

## 2021-09-21 DIAGNOSIS — M17.12 UNILATERAL PRIMARY OSTEOARTHRITIS, LEFT KNEE: ICD-10-CM

## 2021-09-21 DIAGNOSIS — G47.33 OBSTRUCTIVE SLEEP APNEA (ADULT) (PEDIATRIC): ICD-10-CM

## 2021-09-21 DIAGNOSIS — Z29.9 ENCOUNTER FOR PROPHYLACTIC MEASURES, UNSPECIFIED: ICD-10-CM

## 2021-09-21 DIAGNOSIS — Z01.818 ENCOUNTER FOR OTHER PREPROCEDURAL EXAMINATION: ICD-10-CM

## 2021-09-21 LAB
A1C WITH ESTIMATED AVERAGE GLUCOSE RESULT: 6.9 % — HIGH (ref 4–5.6)
ALBUMIN SERPL ELPH-MCNC: 4.3 G/DL — SIGNIFICANT CHANGE UP (ref 3.3–5.2)
ALP SERPL-CCNC: 90 U/L — SIGNIFICANT CHANGE UP (ref 40–120)
ALT FLD-CCNC: 20 U/L — SIGNIFICANT CHANGE UP
ANION GAP SERPL CALC-SCNC: 11 MMOL/L — SIGNIFICANT CHANGE UP (ref 5–17)
APTT BLD: 27.7 SEC — SIGNIFICANT CHANGE UP (ref 27.5–35.5)
AST SERPL-CCNC: 21 U/L — SIGNIFICANT CHANGE UP
BASOPHILS # BLD AUTO: 0.13 K/UL — SIGNIFICANT CHANGE UP (ref 0–0.2)
BASOPHILS NFR BLD AUTO: 1.2 % — SIGNIFICANT CHANGE UP (ref 0–2)
BILIRUB SERPL-MCNC: 0.3 MG/DL — LOW (ref 0.4–2)
BLD GP AB SCN SERPL QL: SIGNIFICANT CHANGE UP
BUN SERPL-MCNC: 13.2 MG/DL — SIGNIFICANT CHANGE UP (ref 8–20)
CALCIUM SERPL-MCNC: 10.2 MG/DL — SIGNIFICANT CHANGE UP (ref 8.6–10.2)
CHLORIDE SERPL-SCNC: 107 MMOL/L — SIGNIFICANT CHANGE UP (ref 98–107)
CO2 SERPL-SCNC: 24 MMOL/L — SIGNIFICANT CHANGE UP (ref 22–29)
CREAT SERPL-MCNC: 1.03 MG/DL — SIGNIFICANT CHANGE UP (ref 0.5–1.3)
EOSINOPHIL # BLD AUTO: 0.54 K/UL — HIGH (ref 0–0.5)
EOSINOPHIL NFR BLD AUTO: 5.1 % — SIGNIFICANT CHANGE UP (ref 0–6)
ESTIMATED AVERAGE GLUCOSE: 151 MG/DL — HIGH (ref 68–114)
GLUCOSE SERPL-MCNC: 113 MG/DL — HIGH (ref 70–99)
HCT VFR BLD CALC: 40.1 % — SIGNIFICANT CHANGE UP (ref 34.5–45)
HGB BLD-MCNC: 12.8 G/DL — SIGNIFICANT CHANGE UP (ref 11.5–15.5)
IMM GRANULOCYTES NFR BLD AUTO: 0.5 % — SIGNIFICANT CHANGE UP (ref 0–1.5)
INR BLD: 0.98 RATIO — SIGNIFICANT CHANGE UP (ref 0.88–1.16)
LYMPHOCYTES # BLD AUTO: 2.84 K/UL — SIGNIFICANT CHANGE UP (ref 1–3.3)
LYMPHOCYTES # BLD AUTO: 27 % — SIGNIFICANT CHANGE UP (ref 13–44)
MCHC RBC-ENTMCNC: 29.8 PG — SIGNIFICANT CHANGE UP (ref 27–34)
MCHC RBC-ENTMCNC: 31.9 GM/DL — LOW (ref 32–36)
MCV RBC AUTO: 93.3 FL — SIGNIFICANT CHANGE UP (ref 80–100)
MONOCYTES # BLD AUTO: 0.74 K/UL — SIGNIFICANT CHANGE UP (ref 0–0.9)
MONOCYTES NFR BLD AUTO: 7 % — SIGNIFICANT CHANGE UP (ref 2–14)
MRSA PCR RESULT.: SIGNIFICANT CHANGE UP
NEUTROPHILS # BLD AUTO: 6.21 K/UL — SIGNIFICANT CHANGE UP (ref 1.8–7.4)
NEUTROPHILS NFR BLD AUTO: 59.2 % — SIGNIFICANT CHANGE UP (ref 43–77)
PLATELET # BLD AUTO: 373 K/UL — SIGNIFICANT CHANGE UP (ref 150–400)
POTASSIUM SERPL-MCNC: 5.3 MMOL/L — SIGNIFICANT CHANGE UP (ref 3.5–5.3)
POTASSIUM SERPL-SCNC: 5.3 MMOL/L — SIGNIFICANT CHANGE UP (ref 3.5–5.3)
PROT SERPL-MCNC: 7.7 G/DL — SIGNIFICANT CHANGE UP (ref 6.6–8.7)
PROTHROM AB SERPL-ACNC: 11.4 SEC — SIGNIFICANT CHANGE UP (ref 10.6–13.6)
RBC # BLD: 4.3 M/UL — SIGNIFICANT CHANGE UP (ref 3.8–5.2)
RBC # FLD: 14.2 % — SIGNIFICANT CHANGE UP (ref 10.3–14.5)
S AUREUS DNA NOSE QL NAA+PROBE: SIGNIFICANT CHANGE UP
SODIUM SERPL-SCNC: 141 MMOL/L — SIGNIFICANT CHANGE UP (ref 135–145)
WBC # BLD: 10.51 K/UL — HIGH (ref 3.8–10.5)
WBC # FLD AUTO: 10.51 K/UL — HIGH (ref 3.8–10.5)

## 2021-09-21 PROCEDURE — 93005 ELECTROCARDIOGRAM TRACING: CPT

## 2021-09-21 PROCEDURE — 93010 ELECTROCARDIOGRAM REPORT: CPT

## 2021-09-21 PROCEDURE — G0463: CPT

## 2021-09-21 RX ORDER — INFLUENZA VIRUS VACCINE 15; 15; 15; 15 UG/.5ML; UG/.5ML; UG/.5ML; UG/.5ML
0.5 SUSPENSION INTRAMUSCULAR ONCE
Refills: 0 | Status: DISCONTINUED | OUTPATIENT
Start: 2021-09-21 | End: 2021-10-06

## 2021-09-21 NOTE — H&P PST ADULT - LAB RESULTS AND INTERPRETATION
Labs pending Labs pending  9/21/21 16:17 All available labs noted as documented, all abnormal labs noted as documented. All abnormal labs emailed to Dr. Leida Yeung for review- medical clearance pending, as well as emailed to [facility] of Dr. Nicholas Davidson. Marcio MS, [facility]

## 2021-09-21 NOTE — PATIENT PROFILE ADULT - NSPROHMSYMPCOND_GEN_A_NUR
Pre op teaching surgical scrub pain management instructions given to pt    covid swab instructions given to pt/none

## 2021-09-21 NOTE — PATIENT PROFILE ADULT - NSPRONUTRITIONRISK_GEN_A_NUR
71 year old woman, with PMhx of COPD, breast ca/kidney ca in remission, obesity, NOEMY, anxiety, carpal tunnel syndrome, HTN, HLD, S/P laminectomy, presents with worsening bilateral leg swelling.
No indicators present

## 2021-09-21 NOTE — H&P PST ADULT - HISTORY OF PRESENT ILLNESS
80 y/o female presents today to PST pending upcoming left total knee joint replacement on 9/28/21 with Dr. Nicholas Davidson secondary to unilateral primary OA of the left knee. Pt. with pmhx morbid obesity, type 2 DM, JAVON-CPAP, HTN, [place], depression, HLD, hypothyroidism. Pt. is s/p covid 19 vaccine x 2 with Moderna on 2/14/21. Pt. denies CP or SOB. Pt. states for the past couple of years, pt. has tried sterioid injection in the knee with some relief, but pain has recently been worsening once steroid injection wear off. Pt. states pain is extreme today rated 10/10, patient is complianing of radiation of pain down the leg, pt. uses a cane to walk over the past few weeks, pt. states no relief from any other methods of treatment or medications otherwise, pt. states pain is worse with all physical activity, as well as rest.

## 2021-09-21 NOTE — H&P PST ADULT - NEGATIVE NEUROLOGICAL SYMPTOMS
no transient paralysis/no weakness/no paresthesias/no focal seizures/no syncope/no tremors/no loss of sensation/no difficulty walking/no headache/no loss of consciousness/no hemiparesis/no confusion/no facial palsy

## 2021-09-21 NOTE — H&P PST ADULT - EKG AND INTERPRETATION
EKG sinus bradycardia with sinus arrythmia and 1st degree AV block 59 BPM, pending medical clearance and final cardiac interpretation

## 2021-09-21 NOTE — H&P PST ADULT - NSICDXFAMILYHX_GEN_ALL_CORE_FT
FAMILY HISTORY:  Sibling  Still living? No  Family history of coronary artery disease, Age at diagnosis: 41-50

## 2021-09-21 NOTE — H&P PST ADULT - NSICDXPASTSURGICALHX_GEN_ALL_CORE_FT
PAST SURGICAL HISTORY:  H/O cataract extraction bilateral    H/O total hysterectomy     History of bladder surgery failed sling placement    History of total knee replacement, right 2015    S/P arthroscopy of shoulder R    S/P cholecystectomy

## 2021-09-21 NOTE — H&P PST ADULT - NSICDXPASTMEDICALHX_GEN_ALL_CORE_FT
PAST MEDICAL HISTORY:  Cataract     COVID-19 vaccine series completed Moderna x 2 2/14/21    Depression     Diabetes     DJD (degenerative joint disease)     Dyslipidemia     Edema of both legs     Hard of hearing b/l hearing aids    Hypertension     Hypothyroidism     Morbid obesity     Sleep apnea CPAP    Venous insufficiency of both lower extremities     Vertigo

## 2021-09-21 NOTE — H&P PST ADULT - NEGATIVE MUSCULOSKELETAL SYMPTOMS
no joint swelling/no myalgia/no muscle cramps/no muscle weakness/no neck pain/no arm pain L/no arm pain R/no back pain/no leg pain R

## 2021-09-21 NOTE — H&P PST ADULT - PROBLEM SELECTOR PLAN 2
Medical clearance pending for left total knee joint replacement on 9/28/21 with Dr. Nicholas Davidson secondary to unilateral primary OA of the left knee.

## 2021-09-21 NOTE — H&P PST ADULT - ASSESSMENT
80 y/o morbidly obese female presents today to PST pending upcoming left total knee joint replacement on 21 with Dr. Nicholas Davidson secondary to unilateral primary OA of the left knee. Pt. with pmhx morbid obesity, type 2 DM, JAVON-CPAP, HTN, [place], depression, HLD, hypothyroidism. Pt. is s/p covid 19 vaccine x 2 with Moderna on 21. Pt. denies CP or SOB. Pt. states for the past couple of years, pt. has tried sterioid injection in the knee with some relief, but pain has recently been worsening once steroid injection wear off. Pt. states pain is extreme today rated 10/10, patient is complianing of radiation of pain down the leg, pt. uses a cane to walk over the past few weeks, pt. states no relief from any other methods of treatment or medications otherwise, pt. states pain is worse with all physical activity, as well as rest.    Pt. to have medical clearance with Dr. Leida Yeung- seen 21 per pt., office awaiting PSTs. Pt. verbalized agreement and understanding.   Pt instructed to stop vitamins/supplements/herbal medications/ASA/NSAIDS for one week prior to surgery and discuss with PMD. Pt. verbalized agreement and understanding.   Patient educated on surgical scrub, COVID testing , preadmission instructions, medical clearance and day of procedure medications, Pt. verbalized agreement and understanding.   Pt. advised to stop metformin 24 hours prior to procedure and discuss with PMD, and to also discuss ASA and glipizide hold with PCP. Pt. verbalized agreement and understanding.     1CAPRINI SCORE    AGE RELATED RISK FACTORS                                                             [ ] Age 41-60 years                                            (1 Point)  [ ] Age: 61-74 years                                           (2 Points)                 [x ] Age= 75 years                                                (3 Points)             DISEASE RELATED RISK FACTORS                                                       [ x] Edema in the lower extremities                 (1 Point)                     [ ] Varicose veins                                               (1 Point)                                 [ x] BMI > 25 Kg/m2                                            (1 Point)                                  [ ] Serious infection (ie PNA)                            (1 Point)                     [ ] Lung disease ( COPD, Emphysema)            (1 Point)                                                                          [ ] Acute myocardial infarction                         (1 Point)                  [ ] Congestive heart failure (in the previous month)  (1 Point)         [ ] Inflammatory bowel disease                            (1 Point)                  [ ] Central venous access, PICC or Port               (2 points)       (within the last month)                                                                [ ] Stroke (in the previous month)                        (5 Points)    [ ] Previous or present malignancy                       (2 points)                                                                                                                                                         HEMATOLOGY RELATED FACTORS                                                         [ ] Prior episodes of VTE                                     (3 Points)                     [ ] Positive family history for VTE                      (3 Points)                  [ ] Prothrombin 98610 A                                     (3 Points)                     [ ] Factor V Leiden                                                (3 Points)                        [ ] Lupus anticoagulants                                      (3 Points)                                                           [ ] Anticardiolipin antibodies                              (3 Points)                                                       [ ] High homocysteine in the blood                   (3 Points)                                             [ ] Other congenital or acquired thrombophilia      (3 Points)                                                [ ] Heparin induced thrombocytopenia                  (3 Points)                                        MOBILITY RELATED FACTORS  [ ] Bed rest                                                         (1 Point)  [ ] Plaster cast                                                    (2 points)  [ ] Bed bound for more than 72 hours           (2 Points)    GENDER SPECIFIC FACTORS  [ ] Pregnancy or had a baby within the last month   (1 Point)  [ ] Post-partum < 6 weeks                                   (1 Point)  [ ] Hormonal therapy  or oral contraception   (1 Point)  [ ] History of pregnancy complications              (1 point)  [ ] Unexplained or recurrent              (1 Point)    OTHER RISK FACTORS                                           (1 Point)  [x ] BMI >40, smoking, diabetes requiring insulin, chemotherapy  blood transfusions and length of surgery over 2 hours    SURGERY RELATED RISK FACTORS  [ ]  Section within the last month     (1 Point)  [ ] Minor surgery                                                  (1 Point)  [ ] Arthroscopic surgery                                       (2 Points)  [ ] Planned major surgery lasting more            (2 Points)      than 45 minutes     [ x] Elective hip or knee joint replacement       (5 points)       surgery                                                TRAUMA RELATED RISK FACTORS  [ ] Fracture of the hip, pelvis, or leg                       (5 Points)  [ ] Spinal cord injury resulting in paralysis             (5 points)       (in the previous month)    [ ] Paralysis  (less than 1 month)                             (5 Points)  [ ] Multiple Trauma within 1 month                        (5 Points)    Total Score [  11      ]    Caprini Score 0-2: Low Risk, NO VTE prophylaxis required for most patients, encourage ambulation  Caprini Score 3-6: Moderate Risk , pharmacologic VTE prophylaxis is indicated for most patients (in the absence of contraindications)  Caprini Score Greater than or =7: High risk, pharmocologic VTE prophylaxis indicated for most patients (in the absence of contraindications)    OPIOID RISK TOOL    FRANCISCO EACH BOX THAT APPLIES AND ADD TOTALS AT THE END    FAMILY HISTORY OF SUBSTANCE ABUSE                 FEMALE         MALE                                                Alcohol                             [  ]1 pt          [  ]3pts                                               Illegal Durgs                     [  ]2 pts        [  ]3pts                                               Rx Drugs                           [  ]4 pts        [  ]4 pts    PERSONAL HISTORY OF SUBSTANCE ABUSE                                                                                          Alcohol                             [  ]3 pts       [  ]3 pts                                               Illegal Drugs                     [  ]4 pts        [  ]4 pts                                               Rx Drugs                           [  ]5 pts        [  ]5 pts    AGE BETWEEN 16-45 YEARS                                      [  ]1 pt         [  ]1 pt    HISTORY OF PREADOLESCENT   SEXUAL ABUSE                                                             [  ]3 pts        [  ]0pts    PSYCHOLOGICAL DISEASE                     ADD, OCD, Bipolar, Schizophrenia        [  ]2 pts         [  ]2 pts                      Depression                                               [x  ]1 pt           [  ]1 pt           SCORING TOTAL   (add numbers and type here)              (1)                                     A score of 3 or lower indicated LOW risk for future opioid abuse  A score of 4 to 7 indicated moderate risk for future opioid abuse  A score of 8 or higher indicates a high risk for opioid abuse

## 2021-09-21 NOTE — H&P PST ADULT - ACTIVITY
+SOB with moderate physical exertion, deneis SOB with mild physical exertion. Denies CP with mild to moderate physical exertion

## 2021-09-22 ENCOUNTER — APPOINTMENT (OUTPATIENT)
Dept: ORTHOPEDIC SURGERY | Facility: CLINIC | Age: 81
End: 2021-09-22
Payer: MEDICARE

## 2021-09-22 ENCOUNTER — NON-APPOINTMENT (OUTPATIENT)
Age: 81
End: 2021-09-22

## 2021-09-22 VITALS
SYSTOLIC BLOOD PRESSURE: 154 MMHG | WEIGHT: 275 LBS | BODY MASS INDEX: 45.82 KG/M2 | HEIGHT: 65 IN | HEART RATE: 62 BPM | DIASTOLIC BLOOD PRESSURE: 78 MMHG

## 2021-09-22 DIAGNOSIS — M17.12 UNILATERAL PRIMARY OSTEOARTHRITIS, LEFT KNEE: ICD-10-CM

## 2021-09-22 PROBLEM — G47.30 SLEEP APNEA, UNSPECIFIED: Chronic | Status: ACTIVE | Noted: 2017-10-25

## 2021-09-22 PROBLEM — E03.9 HYPOTHYROIDISM, UNSPECIFIED: Chronic | Status: ACTIVE | Noted: 2021-09-21

## 2021-09-22 PROBLEM — Z92.29 PERSONAL HISTORY OF OTHER DRUG THERAPY: Chronic | Status: ACTIVE | Noted: 2021-09-21

## 2021-09-22 PROBLEM — H91.90 UNSPECIFIED HEARING LOSS, UNSPECIFIED EAR: Chronic | Status: ACTIVE | Noted: 2021-09-21

## 2021-09-22 PROBLEM — R42 DIZZINESS AND GIDDINESS: Chronic | Status: ACTIVE | Noted: 2021-09-21

## 2021-09-22 PROBLEM — E66.01 MORBID (SEVERE) OBESITY DUE TO EXCESS CALORIES: Chronic | Status: ACTIVE | Noted: 2021-09-21

## 2021-09-22 PROCEDURE — 99214 OFFICE O/P EST MOD 30 MIN: CPT

## 2021-09-22 NOTE — END OF VISIT
[FreeTextEntry3] : I, Daryl Merlos, acted solely as a scribe for Dr. Nicholas Davidson on this date 09/22/2021.

## 2021-09-22 NOTE — HISTORY OF PRESENT ILLNESS
[Pain Location] : pain [Worsening] : worsening [___ yrs] : [unfilled] year(s) ago [Walking] : walking [5] : a minimum pain level of 5/10 [10] : a maximum pain level of 10/10 [de-identified] : 82 y/o F presents with left knee pain. She has a known hx of bone on bone medial compartmental osteoarthritis of the left knee. She has been receiving cortisone injections which have been providing less and less relief. She is currently scheduled for a left TKA on 9/28/2021. We prescribed Tramadol at her last appointment which did not provide relief. She is also s/p right TKA 4/7/2015 and has pes bursitis. She states that her left knee pain is worse than the right knee pain prior to the right TKA. She has also been receiving cortisone injections in the pes bursa which provides some relief. She stopped taking her diuretics.

## 2021-09-22 NOTE — PHYSICAL EXAM
[de-identified] : GENERAL APPEARANCE: Well nourished and hydrated, pleasant, alert, and oriented x 3. Appears their stated age. \par HEENT: Normocephalic, extraocular eye motion intact. Nasal septum midline. Oral cavity clear. External auditory canal clear. \par RESPIRATORY: Breath sounds clear and audible in all lobes. No wheezing, No accessory muscle use.\par CARDIOVASCULAR: No apparent abnormalities. No lower leg edema. No varicosities. Pedal pulses are palpable.\par NEUROLOGIC: Sensation is normal, no muscle weakness in the upper or lower extremities.\par DERMATOLOGIC: No apparent skin lesions, moist, warm, no rash.\par SPINE: Cervical spine appears normal and moves freely; thoracic spine appears normal and moves freely; lumbosacral spine appears normal and moves freely, normal, nontender.\par MUSCULOSKELETAL: Hands, wrists, and elbows are normal and move freely, shoulders are normal and move freely. \par Musculoskeletal: Gait: normal and not antalgic . Examination of right knee demonstrates healed midline incision, no obvious signs of infection, no joint effusion, no erythema, smooth and active full range of motion without pain 0- 120 degrees, tenderness over the pes anserine bursa. \par Left knee examination shows varus alignment, medial joint line tenderness and pain with palpation, crepitus with range of motion. \par Significant LE edema\par  \par 5/5 motor strength in bilateral lower extremities. Sensory: Intact in bilateral lower extremities. DTRs: Biceps, brachioradialis, triceps, patellar, ankle and plantar 2+ and symmetric bilaterally. Pulses: dorsalis pedis, posterior tibial, femoral, popliteal, and radial 2+ and symmetric bilaterally. \par Constitutional: Alert and in no acute distress and obese, but well-appearing and not in acute distress.

## 2021-09-22 NOTE — DISCUSSION/SUMMARY
[Medication Risks Reviewed] : Medication risks reviewed [Surgical risks reviewed] : Surgical risks reviewed [de-identified] : 80 y/o F with bone on bone medial compartmental osteoarthritis of the left knee. The patient has severe pain and has failed conservative therapies. She is currently scheduled for a left TKA on 9/28/2021. Pt has significant LE edema today and we strongly encourage the patient to elevate her legs and to restart taking her diuretics. She understands that having LE edema prior to surgery increases her risk for infection. At the last appointment, we prescribed her Tramadol for pain management, but it did not provide her with relief. She is requesting stronger pain medication. We told her that we are not prescribing her oxycodone as it would be very difficult for her to control her pain post-op. She comprehends this and will try to continue to manage her pain. We will see her back in the office at her first post-op appointment. \par \par The patient has been counseled regarding the elevated risks associated with surgical complications in patients with a BMI> 35. The patient demonstrates an understanding of the increased risk. After a lengthy discussion, the patient agreed to make a coordinated effort at weight loss prior to surgical intervention. The patient understands our BMI policy and they will make a conscious effort to improve their BMI.\par \par \par The patient is a 81 year individual with end stage arthritis of their left knee joint. Based upon the patient's continued symptoms and failure to respond to conservative treatment I have recommended a left total knee arthroplasty for this patient. A long discussion took place with the patient describing what a total joint replacement is and what the procedure would entail. A total knee arthroplasty model, similar to the implant that was used during the operation, was utilized to demonstrate and to discuss the various bearing surfaces of the implants. The hospitalization and post-operative care and rehabilitation were also discussed. The use of perioperative antibiotics and DVT prophylaxis were discussed. The risk, benefits and alternatives to a surgical intervention were discussed at length with the patient. The patient was also advised of risks related to the medical comorbidities, elevated body mass index (BMI), and smoking where applicable. We discussed how to reduce modifiable risk factors and encouraged smoking cessation were applicable.. A lengthy discussion took place to review the most common complications including but not limited to: deep vein thrombosis, pulmonary embolus, heart attack, stroke, infection, wound breakdown, numbness, damage to nerves, tendon, muscles, arteries or other blood vessels, death and other possible complications from anesthesia. The patient was told that we will take steps to minimize these risks by using sterile technique, antibiotics and DVT prophylaxis when appropriate and follow the patient postoperatively in the office setting to monitor progress. The possibility of recurrent pain, no improvement in pain and actual worsening of pain were also discussed with the patient.\par The discharge plan of care focused on the patient going home following surgery. The patient was encouraged to make the necessary arrangements to have someone stay with them when they are discharged home. Following discharge, a home care nurse was to the patient. The home care nurse would open the patient’s home care case and request home physical therapy services. Home physical therapy was to commence following discharge provided it was appropriate and covered by the health insurance benefit plan. \par The benefits of surgery were discussed with the patient including the potential for improving her current clinical condition through operative intervention. Alternatives to surgical intervention including continued conservative management were also discussed in detail. All questions were answered to the satisfaction of the patient. The treatment plan of care, as well as a model of a total knee arthroplasty equivalent to the one that will be used for their total joint replacement, was shared with the patient. The patient agreed to the plan of care as well as the use of implants in their total joint replacement.

## 2021-09-24 NOTE — PLAN
[FreeTextEntry1] : OPTIMIZED MEDICALLY FOR PROPOSED SURGICAL PROCEDURE PENDING CARDIOLOGY CLEARANCE AND PRE-OPERATIVE TESTING REVIEW\par GI/DVT PROPHYLAXIS PER SURGERY\par AVOIDANCE OF NSAIDS, VITAMINS AND ASPIRIN CONTAINING PRODUCTS PRIOR TO SURGERY\par TO HOLD METFORMIN AND GLIMEPIRIDE FROM NIGHT PRIOR TO PROCEDURE\par CLOSE MONITORING OF FS'S\par CALL WITH ANY QUESTIONS, CONCERNS OR CHANGES PRIOR TO PROCEDURE\par SUPPORT PROVIDED\par FOLLOW-UP POST-OPERATIVELY \par PRESCRIPTIONS RENEWED\par FLU VACCINE GIVEN AND TOLERATED WELL \par \par addendum 9/24/21: pre-operative testing reviewed.  optimized for procedure pending cardiology clearance

## 2021-09-24 NOTE — HISTORY OF PRESENT ILLNESS
[Sleep Apnea] : sleep apnea [No Adverse Anesthesia Reaction] : no adverse anesthesia reaction in self or family member [Diabetes] : diabetes [(Patient denies any chest pain, claudication, dyspnea on exertion, orthopnea, palpitations or syncope)] : Patient denies any chest pain, claudication, dyspnea on exertion, orthopnea, palpitations or syncope [Aortic Stenosis] : no aortic stenosis [Atrial Fibrillation] : no atrial fibrillation [Coronary Artery Disease] : no coronary artery disease [Recent Myocardial Infarction] : no recent myocardial infarction [Implantable Device/Pacemaker] : no implantable device/pacemaker [Asthma] : no asthma [COPD] : no COPD [Smoker] : not a smoker [Chronic Anticoagulation] : no chronic anticoagulation [Chronic Kidney Disease] : no chronic kidney disease [FreeTextEntry2] : SEPTEMBER 28, 2021 [FreeTextEntry1] : LEFT KNEE REPLACEMENT [FreeTextEntry3] : DR. ALY [FreeTextEntry4] : PRESENTING FOR MEDICAL CLEARANCE FOR UPCOMING KNEE SURGERY FOR OSTEOARTHRITIS AND PAIN.  HAS TRIED CONSERVATIVE MANAGEMENT INCLUDING CORTISONE INJECTIONS.  IS FEELING WELL TODAY.  NO HISTORY OF MI, STROKE OR SEIZURE.  NO PERSONAL OR FAMILY HISTORY OF BLOOD OR CLOTTING DISORDERS.  DENIES EASY BLEEDING OR BRUISING.  HAS HAD NO HEADACHE, DIZZINESS, CHEST PAIN, SHORTNESS OF BREATH, GI OR URINARY COMPLAINTS.  ONLY COMPLAINT IS KNEE PAIN.  HAS NO OTHER COMPLAINTS TODAY.  CHRONIC HISTORY OF HYPERTENSION, HYPERLIPIDEMIA, NON-ISCHEMIC CARDIOMYOPATHY, HYPOTHYROIDISM, DIABETES, DEPRESSION AND OBESITY. [FreeTextEntry7] : SEEING CARDIOLOGY FOR CLEARANCE - HAD ECHOCARDIOGRAM ON MONDAY

## 2021-09-25 DIAGNOSIS — Z01.818 ENCOUNTER FOR OTHER PREPROCEDURAL EXAMINATION: ICD-10-CM

## 2021-09-26 ENCOUNTER — APPOINTMENT (OUTPATIENT)
Dept: DISASTER EMERGENCY | Facility: CLINIC | Age: 81
End: 2021-09-26

## 2021-09-27 ENCOUNTER — FORM ENCOUNTER (OUTPATIENT)
Age: 81
End: 2021-09-27

## 2021-09-27 ENCOUNTER — APPOINTMENT (OUTPATIENT)
Dept: FAMILY MEDICINE | Facility: CLINIC | Age: 81
End: 2021-09-27

## 2021-09-27 ENCOUNTER — TRANSCRIPTION ENCOUNTER (OUTPATIENT)
Age: 81
End: 2021-09-27

## 2021-09-27 LAB — SARS-COV-2 N GENE NPH QL NAA+PROBE: NOT DETECTED

## 2021-09-28 ENCOUNTER — INPATIENT (INPATIENT)
Facility: HOSPITAL | Age: 81
LOS: 1 days | Discharge: HOME | DRG: 470 | End: 2021-09-30
Attending: ORTHOPAEDIC SURGERY | Admitting: ORTHOPAEDIC SURGERY
Payer: MEDICARE

## 2021-09-28 ENCOUNTER — APPOINTMENT (OUTPATIENT)
Dept: ORTHOPEDIC SURGERY | Facility: HOSPITAL | Age: 81
End: 2021-09-28

## 2021-09-28 ENCOUNTER — TRANSCRIPTION ENCOUNTER (OUTPATIENT)
Age: 81
End: 2021-09-28

## 2021-09-28 VITALS
OXYGEN SATURATION: 98 % | HEART RATE: 86 BPM | TEMPERATURE: 98 F | WEIGHT: 277.78 LBS | RESPIRATION RATE: 16 BRPM | HEIGHT: 65 IN | DIASTOLIC BLOOD PRESSURE: 58 MMHG | SYSTOLIC BLOOD PRESSURE: 171 MMHG

## 2021-09-28 DIAGNOSIS — M17.12 UNILATERAL PRIMARY OSTEOARTHRITIS, LEFT KNEE: ICD-10-CM

## 2021-09-28 DIAGNOSIS — Z90.710 ACQUIRED ABSENCE OF BOTH CERVIX AND UTERUS: Chronic | ICD-10-CM

## 2021-09-28 DIAGNOSIS — Z98.89 OTHER SPECIFIED POSTPROCEDURAL STATES: Chronic | ICD-10-CM

## 2021-09-28 DIAGNOSIS — Z98.49 CATARACT EXTRACTION STATUS, UNSPECIFIED EYE: Chronic | ICD-10-CM

## 2021-09-28 DIAGNOSIS — Z90.49 ACQUIRED ABSENCE OF OTHER SPECIFIED PARTS OF DIGESTIVE TRACT: Chronic | ICD-10-CM

## 2021-09-28 DIAGNOSIS — Z96.651 PRESENCE OF RIGHT ARTIFICIAL KNEE JOINT: Chronic | ICD-10-CM

## 2021-09-28 LAB
GLUCOSE BLDC GLUCOMTR-MCNC: 169 MG/DL — HIGH (ref 70–99)
GLUCOSE BLDC GLUCOMTR-MCNC: 176 MG/DL — HIGH (ref 70–99)
GLUCOSE BLDC GLUCOMTR-MCNC: 183 MG/DL — HIGH (ref 70–99)
GLUCOSE BLDC GLUCOMTR-MCNC: 211 MG/DL — HIGH (ref 70–99)

## 2021-09-28 PROCEDURE — 73560 X-RAY EXAM OF KNEE 1 OR 2: CPT | Mod: 26,LT

## 2021-09-28 PROCEDURE — 27447 TOTAL KNEE ARTHROPLASTY: CPT | Mod: AS,LT

## 2021-09-28 PROCEDURE — 99222 1ST HOSP IP/OBS MODERATE 55: CPT

## 2021-09-28 PROCEDURE — 20985 CPTR-ASST DIR MS PX: CPT

## 2021-09-28 PROCEDURE — 27447 TOTAL KNEE ARTHROPLASTY: CPT | Mod: LT

## 2021-09-28 RX ORDER — APREPITANT 80 MG/1
40 CAPSULE ORAL ONCE
Refills: 0 | Status: COMPLETED | OUTPATIENT
Start: 2021-09-28 | End: 2021-09-28

## 2021-09-28 RX ORDER — SODIUM CHLORIDE 9 MG/ML
1000 INJECTION, SOLUTION INTRAVENOUS
Refills: 0 | Status: DISCONTINUED | OUTPATIENT
Start: 2021-09-28 | End: 2021-09-29

## 2021-09-28 RX ORDER — ONDANSETRON 8 MG/1
4 TABLET, FILM COATED ORAL EVERY 6 HOURS
Refills: 0 | Status: DISCONTINUED | OUTPATIENT
Start: 2021-09-28 | End: 2021-09-30

## 2021-09-28 RX ORDER — MAGNESIUM HYDROXIDE 400 MG/1
30 TABLET, CHEWABLE ORAL DAILY
Refills: 0 | Status: DISCONTINUED | OUTPATIENT
Start: 2021-09-28 | End: 2021-09-30

## 2021-09-28 RX ORDER — SODIUM CHLORIDE 9 MG/ML
1000 INJECTION, SOLUTION INTRAVENOUS
Refills: 0 | Status: DISCONTINUED | OUTPATIENT
Start: 2021-09-28 | End: 2021-09-30

## 2021-09-28 RX ORDER — INSULIN LISPRO 100/ML
VIAL (ML) SUBCUTANEOUS AT BEDTIME
Refills: 0 | Status: DISCONTINUED | OUTPATIENT
Start: 2021-09-28 | End: 2021-09-30

## 2021-09-28 RX ORDER — ATORVASTATIN CALCIUM 80 MG/1
20 TABLET, FILM COATED ORAL AT BEDTIME
Refills: 0 | Status: DISCONTINUED | OUTPATIENT
Start: 2021-09-28 | End: 2021-09-30

## 2021-09-28 RX ORDER — SODIUM CHLORIDE 9 MG/ML
3 INJECTION INTRAMUSCULAR; INTRAVENOUS; SUBCUTANEOUS EVERY 8 HOURS
Refills: 0 | Status: DISCONTINUED | OUTPATIENT
Start: 2021-09-28 | End: 2021-09-28

## 2021-09-28 RX ORDER — ACETAMINOPHEN 500 MG
1000 TABLET ORAL ONCE
Refills: 0 | Status: COMPLETED | OUTPATIENT
Start: 2021-09-28 | End: 2021-09-28

## 2021-09-28 RX ORDER — ACETAMINOPHEN 500 MG
975 TABLET ORAL ONCE
Refills: 0 | Status: COMPLETED | OUTPATIENT
Start: 2021-09-28 | End: 2021-09-28

## 2021-09-28 RX ORDER — ASPIRIN/CALCIUM CARB/MAGNESIUM 324 MG
325 TABLET ORAL
Refills: 0 | Status: DISCONTINUED | OUTPATIENT
Start: 2021-09-28 | End: 2021-09-30

## 2021-09-28 RX ORDER — OXYCODONE HYDROCHLORIDE 5 MG/1
5 TABLET ORAL ONCE
Refills: 0 | Status: DISCONTINUED | OUTPATIENT
Start: 2021-09-28 | End: 2021-09-28

## 2021-09-28 RX ORDER — DEXTROSE 50 % IN WATER 50 %
15 SYRINGE (ML) INTRAVENOUS ONCE
Refills: 0 | Status: DISCONTINUED | OUTPATIENT
Start: 2021-09-28 | End: 2021-09-30

## 2021-09-28 RX ORDER — POLYETHYLENE GLYCOL 3350 17 G/17G
17 POWDER, FOR SOLUTION ORAL AT BEDTIME
Refills: 0 | Status: DISCONTINUED | OUTPATIENT
Start: 2021-09-28 | End: 2021-09-30

## 2021-09-28 RX ORDER — METOPROLOL TARTRATE 50 MG
50 TABLET ORAL
Refills: 0 | Status: DISCONTINUED | OUTPATIENT
Start: 2021-09-29 | End: 2021-09-30

## 2021-09-28 RX ORDER — OXYCODONE HYDROCHLORIDE 5 MG/1
10 TABLET ORAL
Refills: 0 | Status: DISCONTINUED | OUTPATIENT
Start: 2021-09-28 | End: 2021-09-30

## 2021-09-28 RX ORDER — CELECOXIB 200 MG/1
400 CAPSULE ORAL ONCE
Refills: 0 | Status: COMPLETED | OUTPATIENT
Start: 2021-09-28 | End: 2021-09-28

## 2021-09-28 RX ORDER — SENNA PLUS 8.6 MG/1
2 TABLET ORAL AT BEDTIME
Refills: 0 | Status: DISCONTINUED | OUTPATIENT
Start: 2021-09-28 | End: 2021-09-30

## 2021-09-28 RX ORDER — INSULIN LISPRO 100/ML
VIAL (ML) SUBCUTANEOUS
Refills: 0 | Status: DISCONTINUED | OUTPATIENT
Start: 2021-09-28 | End: 2021-09-30

## 2021-09-28 RX ORDER — GLUCAGON INJECTION, SOLUTION 0.5 MG/.1ML
1 INJECTION, SOLUTION SUBCUTANEOUS ONCE
Refills: 0 | Status: DISCONTINUED | OUTPATIENT
Start: 2021-09-28 | End: 2021-09-30

## 2021-09-28 RX ORDER — TRANEXAMIC ACID 100 MG/ML
1000 INJECTION, SOLUTION INTRAVENOUS ONCE
Refills: 0 | Status: DISCONTINUED | OUTPATIENT
Start: 2021-09-28 | End: 2021-09-28

## 2021-09-28 RX ORDER — ESCITALOPRAM OXALATE 10 MG/1
20 TABLET, FILM COATED ORAL DAILY
Refills: 0 | Status: DISCONTINUED | OUTPATIENT
Start: 2021-09-28 | End: 2021-09-30

## 2021-09-28 RX ORDER — PANTOPRAZOLE SODIUM 20 MG/1
40 TABLET, DELAYED RELEASE ORAL
Refills: 0 | Status: DISCONTINUED | OUTPATIENT
Start: 2021-09-28 | End: 2021-09-30

## 2021-09-28 RX ORDER — HYDROMORPHONE HYDROCHLORIDE 2 MG/ML
4 INJECTION INTRAMUSCULAR; INTRAVENOUS; SUBCUTANEOUS
Refills: 0 | Status: DISCONTINUED | OUTPATIENT
Start: 2021-09-28 | End: 2021-09-30

## 2021-09-28 RX ORDER — OXYCODONE HYDROCHLORIDE 5 MG/1
5 TABLET ORAL
Refills: 0 | Status: DISCONTINUED | OUTPATIENT
Start: 2021-09-28 | End: 2021-09-30

## 2021-09-28 RX ORDER — FENTANYL CITRATE 50 UG/ML
25 INJECTION INTRAVENOUS
Refills: 0 | Status: DISCONTINUED | OUTPATIENT
Start: 2021-09-28 | End: 2021-09-28

## 2021-09-28 RX ORDER — DEXTROSE 50 % IN WATER 50 %
25 SYRINGE (ML) INTRAVENOUS ONCE
Refills: 0 | Status: DISCONTINUED | OUTPATIENT
Start: 2021-09-28 | End: 2021-09-30

## 2021-09-28 RX ORDER — CEFAZOLIN SODIUM 1 G
2000 VIAL (EA) INJECTION
Refills: 0 | Status: COMPLETED | OUTPATIENT
Start: 2021-09-28 | End: 2021-09-29

## 2021-09-28 RX ORDER — ACETAMINOPHEN 500 MG
975 TABLET ORAL EVERY 8 HOURS
Refills: 0 | Status: DISCONTINUED | OUTPATIENT
Start: 2021-09-28 | End: 2021-09-30

## 2021-09-28 RX ORDER — CEFAZOLIN SODIUM 1 G
3000 VIAL (EA) INJECTION ONCE
Refills: 0 | Status: DISCONTINUED | OUTPATIENT
Start: 2021-09-28 | End: 2021-09-28

## 2021-09-28 RX ORDER — LEVOTHYROXINE SODIUM 125 MCG
50 TABLET ORAL DAILY
Refills: 0 | Status: DISCONTINUED | OUTPATIENT
Start: 2021-09-28 | End: 2021-09-30

## 2021-09-28 RX ORDER — ONDANSETRON 8 MG/1
4 TABLET, FILM COATED ORAL ONCE
Refills: 0 | Status: COMPLETED | OUTPATIENT
Start: 2021-09-28 | End: 2021-09-28

## 2021-09-28 RX ORDER — DEXTROSE 50 % IN WATER 50 %
12.5 SYRINGE (ML) INTRAVENOUS ONCE
Refills: 0 | Status: DISCONTINUED | OUTPATIENT
Start: 2021-09-28 | End: 2021-09-30

## 2021-09-28 RX ADMIN — ONDANSETRON 4 MILLIGRAM(S): 8 TABLET, FILM COATED ORAL at 15:20

## 2021-09-28 RX ADMIN — CELECOXIB 400 MILLIGRAM(S): 200 CAPSULE ORAL at 11:01

## 2021-09-28 RX ADMIN — Medication 325 MILLIGRAM(S): at 17:36

## 2021-09-28 RX ADMIN — OXYCODONE HYDROCHLORIDE 10 MILLIGRAM(S): 5 TABLET ORAL at 21:30

## 2021-09-28 RX ADMIN — Medication 400 MILLIGRAM(S): at 19:27

## 2021-09-28 RX ADMIN — APREPITANT 40 MILLIGRAM(S): 80 CAPSULE ORAL at 11:01

## 2021-09-28 RX ADMIN — Medication 1000 MILLIGRAM(S): at 20:00

## 2021-09-28 RX ADMIN — Medication 975 MILLIGRAM(S): at 11:01

## 2021-09-28 RX ADMIN — FENTANYL CITRATE 25 MICROGRAM(S): 50 INJECTION INTRAVENOUS at 15:19

## 2021-09-28 RX ADMIN — FENTANYL CITRATE 25 MICROGRAM(S): 50 INJECTION INTRAVENOUS at 16:07

## 2021-09-28 RX ADMIN — FENTANYL CITRATE 25 MICROGRAM(S): 50 INJECTION INTRAVENOUS at 16:00

## 2021-09-28 RX ADMIN — FENTANYL CITRATE 25 MICROGRAM(S): 50 INJECTION INTRAVENOUS at 15:54

## 2021-09-28 RX ADMIN — Medication 100 MILLIGRAM(S): at 18:06

## 2021-09-28 RX ADMIN — ATORVASTATIN CALCIUM 20 MILLIGRAM(S): 80 TABLET, FILM COATED ORAL at 21:23

## 2021-09-28 RX ADMIN — FENTANYL CITRATE 25 MICROGRAM(S): 50 INJECTION INTRAVENOUS at 15:39

## 2021-09-28 NOTE — CONSULT NOTE ADULT - ASSESSMENT
80 y/o F with Hx of DM, Hypothyroidism, depression, HTN, OA left knee, morbid obesity, JAVON-CPAP, HTN, HLD, she has pain in the left knee for couple of years, she has tried sterioid injection in the knee with some relief, but pain has recently been worsening once steroid injection wear off, she came in here today for elective left total knee joint replacement on 9/28/21 with Dr. Nicholas Davidson  s/p surgery.     Plan:     OA of the left knee s/p TKR post op day 0:       Pain meds and DVT prophylaxis as per Ortho service  Bowel meds  IV fluids  Monitor CBC  PT eval.   will monitor vitals if hypotensive will give bolus of fluids.       Hypothyroidism: levothyroxine 50 mcg once a day    DM: FS monitoring and coverage insulin, will hold her glipiZIDE  and Metformin    Depression: Lexapro 20 mg once a day.     HLD: Lipitor 20 mg once a day (at bedtime)    HTN: Will continue with Metoprolol tartrate 50 mg 2 times a day with holding parameters    Obesity: Low caloric diet.     JAVON: CPAP is she is using at home.     Hypothyroidism: Levothyroxine 50 mcg once a day    Depression: will continue with Lexapro 20 mg once a day

## 2021-09-28 NOTE — DISCHARGE NOTE PROVIDER - NSDCFUSCHEDAPPT_GEN_ALL_CORE_FT
MAR MERIDA M ; 10/06/2021 ; [facility] [address]  MAR MERIDA M ; 10/06/2021 ; [facility] [address]  MAR MERIDA M ; 10/11/2021 ; [facility] [address]   MAR MERIDA M ; 10/20/2021 ; [facility] [place] [address] [facility]  MAR MERIDA M ; 10/29/2021 ; [facility] [address]  MAR MERIDA M ; 10/29/2021 ; [facility] [address]  MAR MERIDA M ; 11/16/2021 ; [facility] [place] [address] [facility]  MAR MERIDA M ; 11/29/2021 ; [facility] Endocr[facility] [address]   MAR MERIDA M ; 12/16/2021 ; [facility] [address]   MAR MERIDA M ; 12/20/2021 ; [facility] Orth[facility] [address] [facility]

## 2021-09-28 NOTE — PHYSICAL THERAPY INITIAL EVALUATION ADULT - ADDITIONAL COMMENTS
Pt reports living in private home with  who is able to assist. Pt has 4 steps to enter with 1 handrail and no stairs inside. Pt independent prior to admission. Owns RW, cane, shower chair, and commode. Pt drives & is retired.

## 2021-09-28 NOTE — DISCHARGE NOTE PROVIDER - NSDCHHCONTACT_GEN_ALL_CORE_FT
General Sunscreen Counseling: I recommended a broad spectrum sunscreen with a SPF of 30 or higher.  I explained that SPF 30 sunscreens block approximately 97 percent of the sun's harmful rays.  Sunscreens should be applied at least 15 minutes prior to expected sun exposure and then every 2 hours after that as long as sun exposure continues. If swimming or exercising sunscreen should be reapplied every 45 minutes to an hour after getting wet or sweating.  One ounce, or the equivalent of a shot glass full of sunscreen, is adequate to protect the skin not covered by a bathing suit. I also recommended a lip balm with a sunscreen as well. Sun protective clothing can be used in lieu of sunscreen but must be worn the entire time you are exposed to the sun's rays. Detail Level: Detailed As certified below, I, or a nurse practitioner or physician assistant working with me, had a face-to-face encounter that meets the physician face-to-face encounter requirements.

## 2021-09-28 NOTE — CONSULT NOTE ADULT - SUBJECTIVE AND OBJECTIVE BOX
80 y/o F with Hx of DM, Hypothyroidism, depression, HTN, OA left knee, morbid obesity, JAVON-CPAP, HTN, HLD, she has pain in the left knee for couple of years, she has tried sterioid injection in the knee with some relief, but pain has recently been worsening once steroid injection wear off, she came in here today for elective left total knee joint replacement on 9/28/21 with Dr. Nicholas Davidson  s/p surgery, Patient tolerated procedure well, she has some pain in the left knee, she has no chest pain, sob, dizziness, fever, chills, nausea, vomiting.     REVIEW OF SYSTEMS:    CONSTITUTIONAL: No fever, some fatigue  RESPIRATORY: No cough, No shortness of breath  CARDIOVASCULAR: No chest pain, palpitations  GASTROINTESTINAL: No abdominal, No nausea, vomiting  NEUROLOGICAL: No headaches,  loss of strength.  MISCELLANEOUS: Left knee pain     Allergies:  	ibuprofen: Drug, Swelling  	Motrin: Drug, Angioedema, swollen tongue    Home Medications:   * Incomplete Medication History as of 21-Sep-2021 13:41 documented in Structured Notes  · 	levothyroxine 50 mcg (0.05 mg) oral capsule: Last Dose Taken:  , 1 cap(s) orally once a day  · 	glipiZIDE 2.5 mg oral tablet, extended release: Last Dose Taken:  , 1 tab(s) orally once a day  · 	Lexapro 20 mg oral tablet: Last Dose Taken:  , 1 tab(s) orally once a day  · 	Lipitor 20 mg oral tablet: Last Dose Taken:  , 1 tab(s) orally once a day (at bedtime)  · 	metoprolol tartrate 50 mg oral tablet: Last Dose Taken:  , 1 tab(s) orally 2 times a day  · 	aspirin 81 mg oral tablet: Last Dose Taken:  ,  orally 2 times a day  · 	metFORMIN 1000 mg oral tablet, extended release: Last Dose Taken:  ,  orally 2 times a day      PAST MEDICAL HISTORY:  Cataract     COVID-19 vaccine series completed Moderna x 2 2/14/21    Depression     Diabetes     DJD (degenerative joint disease)     Dyslipidemia     Edema of both legs     Hard of hearing b/l hearing aids    Hypertension     Hypothyroidism     Morbid obesity     Sleep apnea CPAP    Venous insufficiency of both lower extremities     Vertigo.     PAST SURGICAL HISTORY:  H/O cataract extraction bilateral    H/O total hysterectomy     History of bladder surgery failed sling placement    History of total knee replacement, right 2015    S/P arthroscopy of shoulder R    S/P cholecystectomy.     FAMILY HISTORY:  Sibling  Still living? No  Family history of coronary artery disease, Age at diagnosis: 41-50.     Social History: Not a smoker, drinker or using any drugs     Vital Signs Last 24 Hrs  T(C): 36.7 (28 Sep 2021 10:27), Max: 36.7 (28 Sep 2021 10:27)  T(F): 98 (28 Sep 2021 10:27), Max: 98 (28 Sep 2021 10:27)  HR: 86 (28 Sep 2021 10:27) (86 - 86)  BP: 171/58 (28 Sep 2021 10:27) (171/58 - 171/58)  RR: 16 (28 Sep 2021 10:27) (16 - 16)  SpO2: 98% (28 Sep 2021 10:27) (98% - 98%)    PHYSICAL EXAM:    GENERAL: Elderly Obese female looking comfortable   HEENT: PERRL, +EOMI  NECK: soft, Supple, No JVD,   CHEST/LUNG: Clear to auscultate bilaterally; No wheezing  HEART: S1S2+, Regular rate and rhythm; No murmurs  ABDOMEN: Soft, Nontender, Nondistended; Bowel sounds present  EXTREMITIES:  1+ Peripheral Pulses, No edema, left Knee Joint area cover with dressing, no bleeding or soaking   SKIN: No rashes or lesions  NEURO: AAOX3, no focal deficits, no motor r sensory loss  PSYCH: normal mood

## 2021-09-28 NOTE — PHYSICAL THERAPY INITIAL EVALUATION ADULT - LEVEL OF INDEPENDENCE: SIT/SUPINE, REHAB EVAL
pt left seated at edge of bed with  present to eat dinner per pt's request. in NAD, CBWR, all lines intact. EDWARD Gonsalves made aware.

## 2021-09-28 NOTE — PROGRESS NOTE ADULT - SUBJECTIVE AND OBJECTIVE BOX
MAR MERIDA  [id number]    History: 81y Female is status post left total knee arthroplasty on POD # 0. Patient is doing well and is comfortable. The patient's pain is controlled using the prescribed pain medications. The patient is participating in physical therapy. Denies fever/chills, numbness or tingling. No new complaints.    Vital Signs Last 24 Hrs  T(C): 36.4 (28 Sep 2021 14:36), Max: 36.7 (28 Sep 2021 10:27)  T(F): 97.5 (28 Sep 2021 14:36), Max: 98 (28 Sep 2021 10:27)  HR: 77 (28 Sep 2021 16:15) (69 - 86)  BP: 125/52 (28 Sep 2021 16:15) (109/88 - 171/58)  BP(mean): --  RR: 16 (28 Sep 2021 16:15) (14 - 18)  SpO2: 99% (28 Sep 2021 16:15) (98% - 99%)    General: Alert, awake, NAD  Physical exam: The left knee dressing is clean, dry and intact. No drainage, discharge, erythema, blistering or ecchymosis noted.   The calf is supple nontender b/l.   SILT. +AT/GC/EHL/FHL.   2+ DP pulse. BCR. No cyanosis.    Plan:   - DVT prophylaxis as prescribed, including use of compression devices and ankle pumps  - Continue physical therapy  - Weight bearing status of surgical extremity: WBAT  - Incentive spirometry encouraged  - Pain control as clinically indicated  - Discharge planning – anticipated discharge is Home / subacute rehabilitation / acute rehabilitation  - f/u AM labs

## 2021-09-28 NOTE — DISCHARGE NOTE PROVIDER - NSDCACTIVITY_GEN_ALL_CORE
Return to Work/School allowed/Do not drive or operate machinery/Stairs allowed/Walking - Indoors allowed/Walking - Outdoors allowed/Follow Instructions Provided by your Surgical Team

## 2021-09-28 NOTE — DISCHARGE NOTE PROVIDER - CARE PROVIDERS DIRECT ADDRESSES
[email or web address]

## 2021-09-28 NOTE — DISCHARGE NOTE PROVIDER - CARE PROVIDER_API CALL
Nicholas Davidson)  Orthopaedic Surgery  [address]  [place] [address]  Phone: [phone]  Fax: [phone]  Follow Up Time:

## 2021-09-28 NOTE — DISCHARGE NOTE PROVIDER - NSDCFUADDINST_GEN_ALL_CORE_FT
The patient will be seen in the office between 2-3 weeks for wound check.   **Your first post-operative visit has been scheduled prior to your admission. PLEASE CONTACT OFFICE TO CONFIRM THE APPOINTMENT DATE. Sutures/Staples/Tape will be removed at that time.  **  The silver based dressing is to be removed 7 days from the date of surgery (10/5).   ** CONTACT THE OFFICE IF THE FOLLOWING DEVELOP:  - the dressing becomes soiled or saturated  - you develop a fever greater that 101F  - the wound becomes red or you develop blistering around the wound  * Patient may shower after post-op day #3.   * The patient will continue home PT consistent with  total knee replacement protocol. Transition to outpatient PT will occur at the time of the first office visit.   * The patient will practice knee extension exercises regularly to minimize hamstring contraction.   * The patient is FULL weight bearing.  * The patient will continue ASPIRIN for 6 weeks after surgery for blood clot prevention.  * While on aspirin, the patient will take daily omeprazole or other similar medication to protect the stomach from irritation.   * The patient will take OXYCODONE AND TYLENOL for pain control and adjust according to prescription and patient needs. Contact the office if pain increases while taking prescribed pain medications or related concerns develop.  * Celebrex will be taken twice daily for 3 weeks for pain control and prevention of excessive bone growth. Additional prescription may be requested at your office follow-up visit.   * The patient will take Senna S while taking oxycodone to prevent narcotic associated constipation.  Additionally, increase water intake (drink at least 8 glasses of water daily) and try adding fiber to the diet by eating fruits, vegetables and foods that are rich in grains. If constipation is experienced, contact the medical/primary care provider to discuss further treatment options.  * To avoid injury at home:  - continue use of rolling walker until cleared by physical therapist  - have family or friend remove all throw rug or objects in hallways that may present a trip hazard.  - if you experience any dizziness or medical concerns, call your medical doctor or  911.  * The implant may activate metal detection devices.  The patient will be seen in the office between 2-3 weeks for wound check.   **Your first post-operative visit has been scheduled prior to your admission. PLEASE CONTACT OFFICE TO CONFIRM THE APPOINTMENT DATE. Tape will be removed at that time.  **  The silver based dressing is to be removed 7 days from the date of surgery (10/5).   ** CONTACT THE OFFICE IF THE FOLLOWING DEVELOP:  - the dressing becomes soiled or saturated  - you develop a fever greater that 101F  - the wound becomes red or you develop blistering around the wound  * Patient may shower after post-op day #3.   * The patient will continue home PT consistent with  total knee replacement protocol. Transition to outpatient PT will occur at the time of the first office visit.   * The patient will practice knee extension exercises regularly to minimize hamstring contraction.   * The patient is FULL weight bearing.  * The patient will continue ASPIRIN for 6 weeks after surgery for blood clot prevention.  * While on aspirin, the patient will take daily omeprazole or other similar medication to protect the stomach from irritation.   * The patient will take OXYCODONE AND TYLENOL for pain control and adjust according to prescription and patient needs. Contact the office if pain increases while taking prescribed pain medications or related concerns develop.  * Celebrex will be taken twice daily for 3 weeks for pain control and prevention of excessive bone growth. Additional prescription may be requested at your office follow-up visit.   * The patient will take Senna S while taking oxycodone to prevent narcotic associated constipation.  Additionally, increase water intake (drink at least 8 glasses of water daily) and try adding fiber to the diet by eating fruits, vegetables and foods that are rich in grains. If constipation is experienced, contact the medical/primary care provider to discuss further treatment options.  * To avoid injury at home:  - continue use of rolling walker until cleared by physical therapist  - have family or friend remove all throw rug or objects in hallways that may present a trip hazard.  - if you experience any dizziness or medical concerns, call your medical doctor or  911.  * The implant may activate metal detection devices.

## 2021-09-28 NOTE — PHYSICAL THERAPY INITIAL EVALUATION ADULT - RANGE OF MOTION EXAMINATION, REHAB EVAL
Left Knee flexion/extension decreased by ~25% other wise WNL/Right LE ROM was WNL (within normal limits)

## 2021-09-28 NOTE — DISCHARGE NOTE PROVIDER - HOSPITAL COURSE
The patient underwent a LEFT TOTAL KNEE REPLACEMENT on 9/28. The patient received antibiotics consistent with SCIP guidelines. The patient underwent the procedure and had no intra-operative complications. Post-operatively, the patient was seen by medicine and PT. The patient received ASPIRIN for DVTP. The patient received pain medications per orthopedic pain management pathway and the pain was appropriately controlled. The patient did not have any post-operative medical complications. The patient was discharged in stable condition.

## 2021-09-28 NOTE — DISCHARGE NOTE PROVIDER - NSDCMRMEDTOKEN_GEN_ALL_CORE_FT
aspirin 81 mg oral tablet:  orally 2 times a day  glipiZIDE 2.5 mg oral tablet, extended release: 1 tab(s) orally once a day  levothyroxine 50 mcg (0.05 mg) oral capsule: 1 cap(s) orally once a day  Lexapro 20 mg oral tablet: 1 tab(s) orally once a day  Lipitor 20 mg oral tablet: 1 tab(s) orally once a day (at bedtime)  metFORMIN 1000 mg oral tablet, extended release:  orally 2 times a day  metoprolol tartrate 50 mg oral tablet: 1 tab(s) orally 2 times a day   acetaminophen 325 mg oral tablet: 3 tab(s) orally every 8 hours  aspirin 325 mg oral tablet: 1 tab(s) orally 2 times a day  glipiZIDE 2.5 mg oral tablet, extended release: 1 tab(s) orally once a day  levothyroxine 50 mcg (0.05 mg) oral capsule: 1 cap(s) orally once a day  Lexapro 20 mg oral tablet: 1 tab(s) orally once a day  Lipitor 20 mg oral tablet: 1 tab(s) orally once a day (at bedtime)  metFORMIN 1000 mg oral tablet, extended release:  orally 2 times a day  metoprolol tartrate 50 mg oral tablet: 1 tab(s) orally 2 times a day  omeprazole 20 mg oral delayed release capsule: 1 cap(s) orally once a day   oxyCODONE 5 mg oral tablet: 1-2 tab(s) orally every 4 to 6 hours, As Needed -Mild to Moderate Pain (1 - 3) MDD:6  Senna S 50 mg-8.6 mg oral tablet: 2 tab(s) orally once a day (at bedtime)

## 2021-09-28 NOTE — PHYSICAL THERAPY INITIAL EVALUATION ADULT - GENERAL OBSERVATIONS, REHAB EVAL
Pt received in bed, + IV, +b/l SCDs, + left ace wrap, breathing on RA in NAD, in 4/10 left knee pain, agreeable to PT evaluation

## 2021-09-29 LAB
ANION GAP SERPL CALC-SCNC: 11 MMOL/L — SIGNIFICANT CHANGE UP (ref 5–17)
ANION GAP SERPL CALC-SCNC: 13 MMOL/L — SIGNIFICANT CHANGE UP (ref 5–17)
BUN SERPL-MCNC: 20.5 MG/DL — HIGH (ref 8–20)
BUN SERPL-MCNC: 21.1 MG/DL — HIGH (ref 8–20)
CALCIUM SERPL-MCNC: 8.9 MG/DL — SIGNIFICANT CHANGE UP (ref 8.6–10.2)
CALCIUM SERPL-MCNC: 9.3 MG/DL — SIGNIFICANT CHANGE UP (ref 8.6–10.2)
CHLORIDE SERPL-SCNC: 101 MMOL/L — SIGNIFICANT CHANGE UP (ref 98–107)
CHLORIDE SERPL-SCNC: 99 MMOL/L — SIGNIFICANT CHANGE UP (ref 98–107)
CO2 SERPL-SCNC: 24 MMOL/L — SIGNIFICANT CHANGE UP (ref 22–29)
CO2 SERPL-SCNC: 24 MMOL/L — SIGNIFICANT CHANGE UP (ref 22–29)
COVID-19 SPIKE DOMAIN AB INTERP: POSITIVE
COVID-19 SPIKE DOMAIN ANTIBODY RESULT: >250 U/ML — HIGH
CREAT SERPL-MCNC: 1.37 MG/DL — HIGH (ref 0.5–1.3)
CREAT SERPL-MCNC: 1.4 MG/DL — HIGH (ref 0.5–1.3)
GLUCOSE BLDC GLUCOMTR-MCNC: 183 MG/DL — HIGH (ref 70–99)
GLUCOSE BLDC GLUCOMTR-MCNC: 204 MG/DL — HIGH (ref 70–99)
GLUCOSE BLDC GLUCOMTR-MCNC: 211 MG/DL — HIGH (ref 70–99)
GLUCOSE BLDC GLUCOMTR-MCNC: 220 MG/DL — HIGH (ref 70–99)
GLUCOSE SERPL-MCNC: 230 MG/DL — HIGH (ref 70–99)
GLUCOSE SERPL-MCNC: 234 MG/DL — HIGH (ref 70–99)
HCT VFR BLD CALC: 35.9 % — SIGNIFICANT CHANGE UP (ref 34.5–45)
HGB BLD-MCNC: 11.1 G/DL — LOW (ref 11.5–15.5)
MCHC RBC-ENTMCNC: 29 PG — SIGNIFICANT CHANGE UP (ref 27–34)
MCHC RBC-ENTMCNC: 30.9 GM/DL — LOW (ref 32–36)
MCV RBC AUTO: 93.7 FL — SIGNIFICANT CHANGE UP (ref 80–100)
PLATELET # BLD AUTO: 272 K/UL — SIGNIFICANT CHANGE UP (ref 150–400)
POTASSIUM SERPL-MCNC: 4.5 MMOL/L — SIGNIFICANT CHANGE UP (ref 3.5–5.3)
POTASSIUM SERPL-MCNC: 5.1 MMOL/L — SIGNIFICANT CHANGE UP (ref 3.5–5.3)
POTASSIUM SERPL-SCNC: 4.5 MMOL/L — SIGNIFICANT CHANGE UP (ref 3.5–5.3)
POTASSIUM SERPL-SCNC: 5.1 MMOL/L — SIGNIFICANT CHANGE UP (ref 3.5–5.3)
RBC # BLD: 3.83 M/UL — SIGNIFICANT CHANGE UP (ref 3.8–5.2)
RBC # FLD: 13.6 % — SIGNIFICANT CHANGE UP (ref 10.3–14.5)
SARS-COV-2 IGG+IGM SERPL QL IA: >250 U/ML — HIGH
SARS-COV-2 IGG+IGM SERPL QL IA: POSITIVE
SODIUM SERPL-SCNC: 136 MMOL/L — SIGNIFICANT CHANGE UP (ref 135–145)
SODIUM SERPL-SCNC: 136 MMOL/L — SIGNIFICANT CHANGE UP (ref 135–145)
WBC # BLD: 9.63 K/UL — SIGNIFICANT CHANGE UP (ref 3.8–10.5)
WBC # FLD AUTO: 9.63 K/UL — SIGNIFICANT CHANGE UP (ref 3.8–10.5)

## 2021-09-29 PROCEDURE — 99232 SBSQ HOSP IP/OBS MODERATE 35: CPT

## 2021-09-29 RX ORDER — SODIUM CHLORIDE 9 MG/ML
1000 INJECTION INTRAMUSCULAR; INTRAVENOUS; SUBCUTANEOUS
Refills: 0 | Status: DISCONTINUED | OUTPATIENT
Start: 2021-09-29 | End: 2021-09-30

## 2021-09-29 RX ORDER — SENNOSIDES/DOCUSATE SODIUM 8.6MG-50MG
2 TABLET ORAL
Qty: 28 | Refills: 0
Start: 2021-09-29 | End: 2021-10-12

## 2021-09-29 RX ORDER — OXYCODONE HYDROCHLORIDE 5 MG/1
1 TABLET ORAL
Qty: 42 | Refills: 0
Start: 2021-09-29 | End: 2021-10-05

## 2021-09-29 RX ORDER — ASPIRIN/CALCIUM CARB/MAGNESIUM 324 MG
1 TABLET ORAL
Qty: 84 | Refills: 0
Start: 2021-09-29 | End: 2021-11-09

## 2021-09-29 RX ORDER — OMEPRAZOLE 10 MG/1
1 CAPSULE, DELAYED RELEASE ORAL
Qty: 42 | Refills: 0
Start: 2021-09-29 | End: 2021-11-09

## 2021-09-29 RX ORDER — ACETAMINOPHEN 500 MG
3 TABLET ORAL
Qty: 0 | Refills: 0 | DISCHARGE
Start: 2021-09-29

## 2021-09-29 RX ORDER — SODIUM CHLORIDE 9 MG/ML
500 INJECTION INTRAMUSCULAR; INTRAVENOUS; SUBCUTANEOUS
Refills: 0 | Status: DISCONTINUED | OUTPATIENT
Start: 2021-09-29 | End: 2021-09-29

## 2021-09-29 RX ADMIN — ESCITALOPRAM OXALATE 20 MILLIGRAM(S): 10 TABLET, FILM COATED ORAL at 12:25

## 2021-09-29 RX ADMIN — SODIUM CHLORIDE 75 MILLILITER(S): 9 INJECTION INTRAMUSCULAR; INTRAVENOUS; SUBCUTANEOUS at 22:25

## 2021-09-29 RX ADMIN — OXYCODONE HYDROCHLORIDE 10 MILLIGRAM(S): 5 TABLET ORAL at 21:45

## 2021-09-29 RX ADMIN — PANTOPRAZOLE SODIUM 40 MILLIGRAM(S): 20 TABLET, DELAYED RELEASE ORAL at 05:49

## 2021-09-29 RX ADMIN — Medication 975 MILLIGRAM(S): at 23:00

## 2021-09-29 RX ADMIN — Medication 975 MILLIGRAM(S): at 14:52

## 2021-09-29 RX ADMIN — Medication 325 MILLIGRAM(S): at 17:49

## 2021-09-29 RX ADMIN — SENNA PLUS 2 TABLET(S): 8.6 TABLET ORAL at 22:20

## 2021-09-29 RX ADMIN — Medication 975 MILLIGRAM(S): at 05:49

## 2021-09-29 RX ADMIN — Medication 975 MILLIGRAM(S): at 22:17

## 2021-09-29 RX ADMIN — OXYCODONE HYDROCHLORIDE 10 MILLIGRAM(S): 5 TABLET ORAL at 18:19

## 2021-09-29 RX ADMIN — Medication 325 MILLIGRAM(S): at 05:48

## 2021-09-29 RX ADMIN — Medication 4: at 08:34

## 2021-09-29 RX ADMIN — OXYCODONE HYDROCHLORIDE 10 MILLIGRAM(S): 5 TABLET ORAL at 13:00

## 2021-09-29 RX ADMIN — OXYCODONE HYDROCHLORIDE 10 MILLIGRAM(S): 5 TABLET ORAL at 20:54

## 2021-09-29 RX ADMIN — Medication 50 MILLIGRAM(S): at 17:43

## 2021-09-29 RX ADMIN — OXYCODONE HYDROCHLORIDE 10 MILLIGRAM(S): 5 TABLET ORAL at 03:59

## 2021-09-29 RX ADMIN — Medication 100 MILLIGRAM(S): at 03:17

## 2021-09-29 RX ADMIN — Medication 975 MILLIGRAM(S): at 06:39

## 2021-09-29 RX ADMIN — Medication 4: at 12:25

## 2021-09-29 RX ADMIN — ATORVASTATIN CALCIUM 20 MILLIGRAM(S): 80 TABLET, FILM COATED ORAL at 22:19

## 2021-09-29 RX ADMIN — Medication 4: at 17:42

## 2021-09-29 RX ADMIN — Medication 50 MILLIGRAM(S): at 05:49

## 2021-09-29 RX ADMIN — Medication 1 TABLET(S): at 12:25

## 2021-09-29 RX ADMIN — OXYCODONE HYDROCHLORIDE 10 MILLIGRAM(S): 5 TABLET ORAL at 12:25

## 2021-09-29 RX ADMIN — OXYCODONE HYDROCHLORIDE 10 MILLIGRAM(S): 5 TABLET ORAL at 08:38

## 2021-09-29 RX ADMIN — OXYCODONE HYDROCHLORIDE 10 MILLIGRAM(S): 5 TABLET ORAL at 17:42

## 2021-09-29 RX ADMIN — Medication 50 MICROGRAM(S): at 05:49

## 2021-09-29 RX ADMIN — OXYCODONE HYDROCHLORIDE 10 MILLIGRAM(S): 5 TABLET ORAL at 09:20

## 2021-09-29 RX ADMIN — OXYCODONE HYDROCHLORIDE 10 MILLIGRAM(S): 5 TABLET ORAL at 02:44

## 2021-09-29 RX ADMIN — POLYETHYLENE GLYCOL 3350 17 GRAM(S): 17 POWDER, FOR SOLUTION ORAL at 22:20

## 2021-09-29 RX ADMIN — Medication 975 MILLIGRAM(S): at 15:22

## 2021-09-29 NOTE — PROGRESS NOTE ADULT - ASSESSMENT
82 y/o F with Hx of DM, Hypothyroidism, depression, HTN, OA left knee, morbid obesity, JAVON-CPAP, HTN, HLD, she has pain in the left knee for couple of years, she has tried sterioid injection in the knee with some relief, but pain has recently been worsening once steroid injection wear off, she came in here today for elective left total knee joint replacement on 9/28/21 with Dr. Nicholas Davidson  s/p surgery.     Plan:     OA of the left knee s/p TKR post op day 01:       Pain meds and DVT prophylaxis as per Ortho service  Bowel meds  IV fluids   Monitor CBC  PT eval.   will monitor vitals if hypotensive will give bolus of fluids.       Hypothyroidism: levothyroxine 50 mcg once a day    DM: FS monitoring and coverage insulin, will hold her glipiZIDE  and Metformin    Depression: Lexapro 20 mg once a day.     HLD: Lipitor 20 mg once a day (at bedtime)    HTN: Will continue with Metoprolol tartrate 50 mg 2 times a day with holding parameters    Obesity: Low caloric diet.     JAVON: CPAP is she is using at home.     Hypothyroidism: Levothyroxine 50 mcg once a day    Depression: will continue with Lexapro 20 mg once a day    ERENDIRA: will continue with IV fluids, will repeat BMP later today.     If patient kidney function is better and her dizziness is resolved then ok from the medicine point of view for d/c pending further assessment from PT and Ortho.

## 2021-09-29 NOTE — PROGRESS NOTE ADULT - SUBJECTIVE AND OBJECTIVE BOX
MAR MERIDA  [id number]    History: 81y Female is status post left total knee arthroplasty, POD # 1. Patient is doing well and is comfortable. The patient's pain is controlled using the prescribed pain medications. The patient is participating in physical therapy. Denies nausea, vomiting, chest pain, shortness of breath, abdominal pain or fever. No new complaints.    Vital Signs Last 24 Hrs  T(C): 36.4 (29 Sep 2021 04:38), Max: 36.7 (28 Sep 2021 10:27)  T(F): 97.5 (29 Sep 2021 04:38), Max: 98.1 (28 Sep 2021 20:51)  HR: 70 (29 Sep 2021 04:38) (62 - 86)  BP: 117/64 (29 Sep 2021 04:38) (109/88 - 171/58)  BP(mean): --  RR: 18 (29 Sep 2021 04:38) (14 - 18)  SpO2: 95% (29 Sep 2021 04:38) (92% - 99%)                        11.1   9.63  )-----------( 272      ( 29 Sep 2021 05:36 )             35.9     09-29    136  |  101  |  21.1<H>  ----------------------------<  234<H>  4.5   |  24.0  |  1.37<H>    Ca    9.3      29 Sep 2021 05:36    MEDICATIONS  (STANDING):  acetaminophen   Tablet .. 975 milliGRAM(s) Oral every 8 hours  aspirin 325 milliGRAM(s) Oral two times a day  atorvastatin 20 milliGRAM(s) Oral at bedtime  dextrose 40% Gel 15 Gram(s) Oral once  dextrose 5%. 1000 milliLiter(s) (50 mL/Hr) IV Continuous <Continuous>  dextrose 5%. 1000 milliLiter(s) (100 mL/Hr) IV Continuous <Continuous>  dextrose 50% Injectable 25 Gram(s) IV Push once  dextrose 50% Injectable 12.5 Gram(s) IV Push once  dextrose 50% Injectable 25 Gram(s) IV Push once  escitalopram 20 milliGRAM(s) Oral daily  glucagon  Injectable 1 milliGRAM(s) IntraMuscular once  influenza   Vaccine 0.5 milliLiter(s) IntraMuscular once  insulin lispro (ADMELOG) corrective regimen sliding scale   SubCutaneous three times a day before meals  insulin lispro (ADMELOG) corrective regimen sliding scale   SubCutaneous at bedtime  levothyroxine 50 MICROGram(s) Oral daily  metoprolol tartrate 50 milliGRAM(s) Oral two times a day  multivitamin 1 Tablet(s) Oral daily  pantoprazole    Tablet 40 milliGRAM(s) Oral before breakfast  polyethylene glycol 3350 17 Gram(s) Oral at bedtime  senna 2 Tablet(s) Oral at bedtime  sodium chloride 0.9%. 500 milliLiter(s) (125 mL/Hr) IV Continuous <Continuous>    MEDICATIONS  (PRN):  HYDROmorphone   Tablet 4 milliGRAM(s) Oral every 3 hours PRN Severe Pain (7 - 10)  magnesium hydroxide Suspension 30 milliLiter(s) Oral daily PRN Constipation  ondansetron Injectable 4 milliGRAM(s) IV Push every 6 hours PRN Nausea and/or Vomiting  oxyCODONE    IR 5 milliGRAM(s) Oral every 3 hours PRN Mild Pain (1 - 3)  oxyCODONE    IR 10 milliGRAM(s) Oral every 3 hours PRN Moderate Pain (4 - 6)    Physical exam: The left knee ACE dressing is clean, dry and intact. The calf is supple nontender. Passive range of motion is acceptable to due postoperative pain. Sensation to light touch is grossly intact distally. Motor function distally is 5/5. Extensor hallucis longus and flexor hallucis longus are intact. No foot drop. 2+ dorsalis pedis pulse. Capillary refill is less than 2 seconds. No cyanosis.    Primary Orthopedic Assessment:  •	s/p LEFT total knee replacement	    Secondary  Medical Assessment(s):   •	DM, HTN, JAVON, MO    Plan:   •	DVT prophylaxis as prescribed, including use of compression devices and ankle pumps  •	Continue physical therapy  •	Weightbearing as tolerated of the left lower extremity with assistance of a walker  •	Incentive spirometry encouraged  •	Pain control as clinically indicated  •	Discharge planning – anticipated discharge is Home today when cleared by PT and Medicine

## 2021-09-29 NOTE — PROGRESS NOTE ADULT - SUBJECTIVE AND OBJECTIVE BOX
MAR FUAD    [id number]    81y      Female    Patient is a 81y old  Female who presents with a chief complaint of left total knee arthroplasty  (28 Sep 2021 15:01)      INTERVAL HPI/OVERNIGHT EVENTS:    Patient has some dizziness, denies fever, chills, chest pain, nausea, vomiting.     ROS:   CONSTITUTIONAL: No fever, some fatigue  RESPIRATORY: No cough, No shortness of breath  CARDIOVASCULAR: No chest pain, palpitations  GASTROINTESTINAL: No abdominal, No nausea, vomiting  NEUROLOGICAL: No headaches,  loss of strength.  MISCELLANEOUS: Left knee pain      Vital Signs Last 24 Hrs  T(C): 36.6 (29 Sep 2021 09:06), Max: 36.7 (28 Sep 2021 10:27)  T(F): 97.8 (29 Sep 2021 09:06), Max: 98.1 (28 Sep 2021 20:51)  HR: 60 (29 Sep 2021 09:06) (60 - 86)  BP: 108/52 (29 Sep 2021 09:06) (108/52 - 171/58)  RR: 18 (29 Sep 2021 09:06) (14 - 18)  SpO2: 94% (29 Sep 2021 09:06) (92% - 99%)    PHYSICAL EXAM:    GENERAL: Elderly Obese female looking comfortable   HEENT: PERRL, +EOMI  NECK: soft, Supple, No JVD,   CHEST/LUNG: Clear to auscultate bilaterally; No wheezing  HEART: S1S2+, Regular rate and rhythm; No murmurs  ABDOMEN: Soft, Nontender, Nondistended; Bowel sounds present  EXTREMITIES:  1+ Peripheral Pulses, No edema, left Knee Joint area cover with dressing, no bleeding or soaking   SKIN: No rashes or lesions  NEURO: AAOX3, no focal deficits, no motor r sensory loss  PSYCH: normal mood      LABS:                        11.1   9.63  )-----------( 272      ( 29 Sep 2021 05:36 )             35.9     09-29    136  |  101  |  21.1<H>  ----------------------------<  234<H>  4.5   |  24.0  |  1.37<H>    Ca    9.3      29 Sep 2021 05:36              I&O's Summary    28 Sep 2021 07:01  -  29 Sep 2021 07:00  --------------------------------------------------------  IN: 225 mL / OUT: 0 mL / NET: 225 mL        MEDICATIONS  (STANDING):  acetaminophen   Tablet .. 975 milliGRAM(s) Oral every 8 hours  aspirin 325 milliGRAM(s) Oral two times a day  atorvastatin 20 milliGRAM(s) Oral at bedtime  dextrose 40% Gel 15 Gram(s) Oral once  dextrose 5%. 1000 milliLiter(s) (50 mL/Hr) IV Continuous <Continuous>  dextrose 5%. 1000 milliLiter(s) (100 mL/Hr) IV Continuous <Continuous>  dextrose 50% Injectable 25 Gram(s) IV Push once  dextrose 50% Injectable 12.5 Gram(s) IV Push once  dextrose 50% Injectable 25 Gram(s) IV Push once  escitalopram 20 milliGRAM(s) Oral daily  glucagon  Injectable 1 milliGRAM(s) IntraMuscular once  influenza   Vaccine 0.5 milliLiter(s) IntraMuscular once  insulin lispro (ADMELOG) corrective regimen sliding scale   SubCutaneous three times a day before meals  insulin lispro (ADMELOG) corrective regimen sliding scale   SubCutaneous at bedtime  levothyroxine 50 MICROGram(s) Oral daily  metoprolol tartrate 50 milliGRAM(s) Oral two times a day  multivitamin 1 Tablet(s) Oral daily  pantoprazole    Tablet 40 milliGRAM(s) Oral before breakfast  polyethylene glycol 3350 17 Gram(s) Oral at bedtime  senna 2 Tablet(s) Oral at bedtime  sodium chloride 0.9%. 500 milliLiter(s) (125 mL/Hr) IV Continuous <Continuous>    MEDICATIONS  (PRN):  HYDROmorphone   Tablet 4 milliGRAM(s) Oral every 3 hours PRN Severe Pain (7 - 10)  magnesium hydroxide Suspension 30 milliLiter(s) Oral daily PRN Constipation  ondansetron Injectable 4 milliGRAM(s) IV Push every 6 hours PRN Nausea and/or Vomiting  oxyCODONE    IR 5 milliGRAM(s) Oral every 3 hours PRN Mild Pain (1 - 3)  oxyCODONE    IR 10 milliGRAM(s) Oral every 3 hours PRN Moderate Pain (4 - 6)

## 2021-09-30 ENCOUNTER — TRANSCRIPTION ENCOUNTER (OUTPATIENT)
Age: 81
End: 2021-09-30

## 2021-09-30 ENCOUNTER — NON-APPOINTMENT (OUTPATIENT)
Age: 81
End: 2021-09-30

## 2021-09-30 VITALS
SYSTOLIC BLOOD PRESSURE: 124 MMHG | RESPIRATION RATE: 18 BRPM | TEMPERATURE: 98 F | HEART RATE: 61 BPM | DIASTOLIC BLOOD PRESSURE: 69 MMHG | OXYGEN SATURATION: 93 %

## 2021-09-30 LAB
ANION GAP SERPL CALC-SCNC: 14 MMOL/L — SIGNIFICANT CHANGE UP (ref 5–17)
BUN SERPL-MCNC: 16 MG/DL — SIGNIFICANT CHANGE UP (ref 8–20)
CALCIUM SERPL-MCNC: 8.5 MG/DL — LOW (ref 8.6–10.2)
CHLORIDE SERPL-SCNC: 102 MMOL/L — SIGNIFICANT CHANGE UP (ref 98–107)
CO2 SERPL-SCNC: 20 MMOL/L — LOW (ref 22–29)
CREAT SERPL-MCNC: 1.04 MG/DL — SIGNIFICANT CHANGE UP (ref 0.5–1.3)
GLUCOSE BLDC GLUCOMTR-MCNC: 203 MG/DL — HIGH (ref 70–99)
GLUCOSE SERPL-MCNC: 198 MG/DL — HIGH (ref 70–99)
HCT VFR BLD CALC: 34.7 % — SIGNIFICANT CHANGE UP (ref 34.5–45)
HGB BLD-MCNC: 10.8 G/DL — LOW (ref 11.5–15.5)
MCHC RBC-ENTMCNC: 29.6 PG — SIGNIFICANT CHANGE UP (ref 27–34)
MCHC RBC-ENTMCNC: 31.1 GM/DL — LOW (ref 32–36)
MCV RBC AUTO: 95.1 FL — SIGNIFICANT CHANGE UP (ref 80–100)
PLATELET # BLD AUTO: 241 K/UL — SIGNIFICANT CHANGE UP (ref 150–400)
POTASSIUM SERPL-MCNC: 4.6 MMOL/L — SIGNIFICANT CHANGE UP (ref 3.5–5.3)
POTASSIUM SERPL-SCNC: 4.6 MMOL/L — SIGNIFICANT CHANGE UP (ref 3.5–5.3)
RBC # BLD: 3.65 M/UL — LOW (ref 3.8–5.2)
RBC # FLD: 13.6 % — SIGNIFICANT CHANGE UP (ref 10.3–14.5)
SODIUM SERPL-SCNC: 136 MMOL/L — SIGNIFICANT CHANGE UP (ref 135–145)
WBC # BLD: 9.93 K/UL — SIGNIFICANT CHANGE UP (ref 3.8–10.5)
WBC # FLD AUTO: 9.93 K/UL — SIGNIFICANT CHANGE UP (ref 3.8–10.5)

## 2021-09-30 PROCEDURE — C1776: CPT

## 2021-09-30 PROCEDURE — 82962 GLUCOSE BLOOD TEST: CPT

## 2021-09-30 PROCEDURE — 36415 COLL VENOUS BLD VENIPUNCTURE: CPT

## 2021-09-30 PROCEDURE — 80048 BASIC METABOLIC PNL TOTAL CA: CPT

## 2021-09-30 PROCEDURE — 73560 X-RAY EXAM OF KNEE 1 OR 2: CPT

## 2021-09-30 PROCEDURE — 85027 COMPLETE CBC AUTOMATED: CPT

## 2021-09-30 PROCEDURE — C1713: CPT

## 2021-09-30 PROCEDURE — 86769 SARS-COV-2 COVID-19 ANTIBODY: CPT

## 2021-09-30 PROCEDURE — 99232 SBSQ HOSP IP/OBS MODERATE 35: CPT

## 2021-09-30 RX ADMIN — Medication 1 TABLET(S): at 11:29

## 2021-09-30 RX ADMIN — Medication 975 MILLIGRAM(S): at 11:30

## 2021-09-30 RX ADMIN — Medication 975 MILLIGRAM(S): at 07:00

## 2021-09-30 RX ADMIN — Medication 50 MILLIGRAM(S): at 06:13

## 2021-09-30 RX ADMIN — PANTOPRAZOLE SODIUM 40 MILLIGRAM(S): 20 TABLET, DELAYED RELEASE ORAL at 06:13

## 2021-09-30 RX ADMIN — Medication 975 MILLIGRAM(S): at 06:12

## 2021-09-30 RX ADMIN — Medication 4: at 08:14

## 2021-09-30 RX ADMIN — OXYCODONE HYDROCHLORIDE 10 MILLIGRAM(S): 5 TABLET ORAL at 09:20

## 2021-09-30 RX ADMIN — OXYCODONE HYDROCHLORIDE 10 MILLIGRAM(S): 5 TABLET ORAL at 03:32

## 2021-09-30 RX ADMIN — ESCITALOPRAM OXALATE 20 MILLIGRAM(S): 10 TABLET, FILM COATED ORAL at 11:30

## 2021-09-30 RX ADMIN — Medication 50 MICROGRAM(S): at 06:13

## 2021-09-30 RX ADMIN — Medication 325 MILLIGRAM(S): at 06:13

## 2021-09-30 RX ADMIN — OXYCODONE HYDROCHLORIDE 10 MILLIGRAM(S): 5 TABLET ORAL at 04:30

## 2021-09-30 RX ADMIN — Medication 975 MILLIGRAM(S): at 12:00

## 2021-09-30 RX ADMIN — OXYCODONE HYDROCHLORIDE 10 MILLIGRAM(S): 5 TABLET ORAL at 09:55

## 2021-09-30 NOTE — PROGRESS NOTE ADULT - SUBJECTIVE AND OBJECTIVE BOX
MAR MERIDA  [id number]    History: 81y Female is status post left total knee arthroplasty on 9/28/2021, POD # 2. Patient is doing well and is comfortable. The patient's pain is controlled using the prescribed pain medications. The patient is participating in physical therapy. Denies nausea, vomiting, chest pain, shortness of breath, abdominal pain or fever. No new complaints.                              10.8   9.93  )-----------( 241      ( 30 Sep 2021 06:49 )             34.7     09-30    136  |  102  |  16.0  ----------------------------<  198<H>  4.6   |  20.0<L>  |  1.04    Ca    8.5<L>      30 Sep 2021 06:49        MEDICATIONS  (STANDING):  acetaminophen   Tablet .. 975 milliGRAM(s) Oral every 8 hours  aspirin 325 milliGRAM(s) Oral two times a day  atorvastatin 20 milliGRAM(s) Oral at bedtime  dextrose 40% Gel 15 Gram(s) Oral once  dextrose 5%. 1000 milliLiter(s) (50 mL/Hr) IV Continuous <Continuous>  dextrose 5%. 1000 milliLiter(s) (100 mL/Hr) IV Continuous <Continuous>  dextrose 50% Injectable 25 Gram(s) IV Push once  dextrose 50% Injectable 12.5 Gram(s) IV Push once  dextrose 50% Injectable 25 Gram(s) IV Push once  escitalopram 20 milliGRAM(s) Oral daily  glucagon  Injectable 1 milliGRAM(s) IntraMuscular once  influenza   Vaccine 0.5 milliLiter(s) IntraMuscular once  insulin lispro (ADMELOG) corrective regimen sliding scale   SubCutaneous three times a day before meals  insulin lispro (ADMELOG) corrective regimen sliding scale   SubCutaneous at bedtime  levothyroxine 50 MICROGram(s) Oral daily  metoprolol tartrate 50 milliGRAM(s) Oral two times a day  multivitamin 1 Tablet(s) Oral daily  pantoprazole    Tablet 40 milliGRAM(s) Oral before breakfast  polyethylene glycol 3350 17 Gram(s) Oral at bedtime  senna 2 Tablet(s) Oral at bedtime  sodium chloride 0.9%. 1000 milliLiter(s) (75 mL/Hr) IV Continuous <Continuous>    MEDICATIONS  (PRN):  HYDROmorphone   Tablet 4 milliGRAM(s) Oral every 3 hours PRN Severe Pain (7 - 10)  magnesium hydroxide Suspension 30 milliLiter(s) Oral daily PRN Constipation  ondansetron Injectable 4 milliGRAM(s) IV Push every 6 hours PRN Nausea and/or Vomiting  oxyCODONE    IR 5 milliGRAM(s) Oral every 3 hours PRN Mild Pain (1 - 3)  oxyCODONE    IR 10 milliGRAM(s) Oral every 3 hours PRN Moderate Pain (4 - 6)      Physical exam: The left knee dressing is clean, dry and intact. No drainage or discharge. No erythema is noted. No blistering. No ecchymosis. The calf is supple nontender. No calf tenderness. Sensation to light touch is grossly intact distally. Motor function distally is 5/5. Extensor hallucis longus and flexor hallucis longus are intact. No foot drop. 2+ dorsalis pedis pulse. Capillary refill is less than 2 seconds. No cyanosis.    Primary Orthopedic Assessment:  •	s/p LEFT total knee replacement pod #2    --renal function improved    Plan:   •	DVT prophylaxis aspirin 325mg bid, including use of compression devices and ankle pumps  •	Continue physical therapy  •	Weightbearing as tolerated of the left lower extremity with assistance of a walker  •	Incentive spirometry encouraged  •	Pain control as clinically indicated  •	Discharge planning – anticipated discharge is Home when cleared by medicine

## 2021-09-30 NOTE — PROGRESS NOTE ADULT - ASSESSMENT
80 y/o F with Hx of DM, Hypothyroidism, depression, HTN, OA left knee, morbid obesity, JAVON-CPAP, HTN, HLD, she has pain in the left knee for couple of years, she has tried sterioid injection in the knee with some relief, but pain has recently been worsening once steroid injection wear off, she came in here today for elective left total knee joint replacement on 9/28/21 with Dr. Nicholas Davidson  s/p surgery.     Plan:     OA of the left knee s/p TKR post op day 02:       Pain meds and DVT prophylaxis as per Ortho service  Bowel meds  IV fluids, may DC  PT       Hypothyroidism: levothyroxine 50 mcg once a day    DM: FS monitoring and coverage insulin, resume glipiZIDE  and Metformin on discharge    Depression: Lexapro 20 mg once a day.     HLD: Lipitor 20 mg once a day (at bedtime)    HTN: Will continue with Metoprolol tartrate 50 mg 2 times a day with holding parameters    Obesity: Low caloric diet.     JAVON: CPAP is she is using at home.     Hypothyroidism: Levothyroxine 50 mcg once a day    Depression:  Lexapro 20 mg once a day    ERENDIRA: Resolved with IV hydration. Encourage oral fluid intake 80 y/o F with Hx of DM, Hypothyroidism, depression, HTN, OA left knee, morbid obesity, JAVON-CPAP, HTN, HLD, she has pain in the left knee for couple of years, she has tried sterioid injection in the knee with some relief, but pain has recently been worsening once steroid injection wear off, she came in here today for elective left total knee joint replacement on 9/28/21 with Dr. Nicholas Davidson  s/p surgery.     Plan:     OA of the left knee s/p TKR post op day 02:       Pain meds and DVT prophylaxis as per Ortho service  Bowel meds  IV fluids, may DC  PT       Hypothyroidism: levothyroxine 50 mcg once a day    DM: FS monitoring and coverage insulin, resume glipiZIDE  and Metformin on discharge    Depression: Lexapro 20 mg once a day.     HLD: Lipitor 20 mg once a day (at bedtime)    HTN: Will continue with Metoprolol tartrate 50 mg 2 times a day with holding parameters    Obesity: Low caloric diet.     JAVON: CPAP is she is using at home.     Hypothyroidism: Levothyroxine 50 mcg once a day    Depression:  Lexapro 20 mg once a day    ERENDIRA: Resolved with IV hydration. Encourage oral fluid intake.

## 2021-09-30 NOTE — DISCHARGE NOTE NURSING/CASE MANAGEMENT/SOCIAL WORK - PATIENT PORTAL LINK FT
You can access the FollowMyHealth Patient Portal offered by [facility] by registering at the following website: [email or web address]. By joining [facility]’s FollowMyHealth portal, you will also be able to view your health information using other applications (apps) compatible with our system.

## 2021-09-30 NOTE — PROGRESS NOTE ADULT - NSPROGADDITIONALINFOA_GEN_ALL_CORE
Patient is see and evaluated, chart reviewed in detail, agree with assessment and plan of the NP  patient's pain is well controlled, has no complains  CTA b/l   Left knee Joint area is covered with clean dressing, no bleeding or soaking  Will continue with current plan of care  will d/c IV fluids.  creatinine is better now. Patient is see and evaluated, chart reviewed in detail, agree with assessment and plan of the NP  patient's pain is well controlled, has no complains  CTA b/l   Left knee Joint area is covered with clean dressing, no bleeding or soaking  Will continue with current plan of care  will d/c IV fluids.  creatinine is better now.  Patient is stable from the medicine point of view for discharge pending PT and Ortho eval.

## 2021-09-30 NOTE — PROGRESS NOTE ADULT - SUBJECTIVE AND OBJECTIVE BOX
CC: left total knee arthroplasty      INTERVAL HPI/OVERNIGHT EVENTS: Patient seen and examined. No acute issues overnight. C/O left knee pain, will ask for pain medicine prior to working with PT. Denies chest pain, SOB, dizziness, lightheadedness, nausea, vomiting, fever, chills. Wants to go home.     Vital Signs Last 24 Hrs  T(C): 36.4 (30 Sep 2021 04:40), Max: 36.8 (29 Sep 2021 20:06)  T(F): 97.5 (30 Sep 2021 04:40), Max: 98.3 (29 Sep 2021 20:06)  HR: 61 (30 Sep 2021 04:40) (60 - 76)  BP: 127/72 (30 Sep 2021 04:40) (108/52 - 149/73)  BP(mean): --  RR: 18 (30 Sep 2021 04:40) (18 - 18)  SpO2: 93% (30 Sep 2021 04:40) (93% - 95%)        PHYSICAL EXAM:    GENERAL: NAD, sitting in chair  HEENT: PERRL, +EOMI  NECK: soft, Supple, No JVD,   CHEST/LUNG: Clear to auscultate bilaterally; No wheezing  HEART: S1S2+, Regular rate and rhythm; No murmurs  ABDOMEN: Soft, Nontender, Nondistended; Bowel sounds present  EXTREMITIES:  1+ Peripheral Pulses,  left Knee dressing C/D/I  SKIN: No rashes or lesions  NEURO: AAOX3, no focal deficits, no motor or sensory loss  PSYCH: normal mood    I&O's Detail    29 Sep 2021 07:01  -  30 Sep 2021 07:00  --------------------------------------------------------  IN:    sodium chloride 0.9%: 625 mL    sodium chloride 0.9%: 450 mL  Total IN: 1075 mL    OUT:    Voided (mL): 700 mL  Total OUT: 700 mL    Total NET: 375 mL                                    10.8   9.93  )-----------( 241      ( 30 Sep 2021 06:49 )             34.7     30 Sep 2021 06:49    136    |  102    |  16.0   ----------------------------<  198    4.6     |  20.0   |  1.04     Ca    8.5        30 Sep 2021 06:49        CAPILLARY BLOOD GLUCOSE      POCT Blood Glucose.: 203 mg/dL (30 Sep 2021 07:51)  POCT Blood Glucose.: 183 mg/dL (29 Sep 2021 22:50)  POCT Blood Glucose.: 204 mg/dL (29 Sep 2021 17:41)  POCT Blood Glucose.: 220 mg/dL (29 Sep 2021 12:24)          MEDICATIONS  (STANDING):  acetaminophen   Tablet .. 975 milliGRAM(s) Oral every 8 hours  aspirin 325 milliGRAM(s) Oral two times a day  atorvastatin 20 milliGRAM(s) Oral at bedtime  dextrose 40% Gel 15 Gram(s) Oral once  dextrose 5%. 1000 milliLiter(s) (50 mL/Hr) IV Continuous <Continuous>  dextrose 5%. 1000 milliLiter(s) (100 mL/Hr) IV Continuous <Continuous>  dextrose 50% Injectable 25 Gram(s) IV Push once  dextrose 50% Injectable 12.5 Gram(s) IV Push once  dextrose 50% Injectable 25 Gram(s) IV Push once  escitalopram 20 milliGRAM(s) Oral daily  glucagon  Injectable 1 milliGRAM(s) IntraMuscular once  influenza   Vaccine 0.5 milliLiter(s) IntraMuscular once  insulin lispro (ADMELOG) corrective regimen sliding scale   SubCutaneous three times a day before meals  insulin lispro (ADMELOG) corrective regimen sliding scale   SubCutaneous at bedtime  levothyroxine 50 MICROGram(s) Oral daily  metoprolol tartrate 50 milliGRAM(s) Oral two times a day  multivitamin 1 Tablet(s) Oral daily  pantoprazole    Tablet 40 milliGRAM(s) Oral before breakfast  polyethylene glycol 3350 17 Gram(s) Oral at bedtime  senna 2 Tablet(s) Oral at bedtime  sodium chloride 0.9%. 1000 milliLiter(s) (75 mL/Hr) IV Continuous <Continuous>    MEDICATIONS  (PRN):  HYDROmorphone   Tablet 4 milliGRAM(s) Oral every 3 hours PRN Severe Pain (7 - 10)  magnesium hydroxide Suspension 30 milliLiter(s) Oral daily PRN Constipation  ondansetron Injectable 4 milliGRAM(s) IV Push every 6 hours PRN Nausea and/or Vomiting  oxyCODONE    IR 5 milliGRAM(s) Oral every 3 hours PRN Mild Pain (1 - 3)  oxyCODONE    IR 10 milliGRAM(s) Oral every 3 hours PRN Moderate Pain (4 - 6)      RADIOLOGY & ADDITIONAL TESTS:   CC: left total knee arthroplasty      INTERVAL HPI/OVERNIGHT EVENTS: Patient seen and examined. No acute issues overnight. C/O left knee pain, will ask for pain medicine prior to working with PT. Denies chest pain, SOB, dizziness, lightheadedness, nausea, vomiting, fever, chills. Wants to go home.     Vital Signs Last 24 Hrs  T(C): 36.4 (30 Sep 2021 04:40), Max: 36.8 (29 Sep 2021 20:06)  T(F): 97.5 (30 Sep 2021 04:40), Max: 98.3 (29 Sep 2021 20:06)  HR: 61 (30 Sep 2021 04:40) (60 - 76)  BP: 127/72 (30 Sep 2021 04:40) (108/52 - 149/73)  RR: 18 (30 Sep 2021 04:40) (18 - 18)  SpO2: 93% (30 Sep 2021 04:40) (93% - 95%)        PHYSICAL EXAM:    GENERAL: NAD, sitting in chair  HEENT: PERRL, +EOMI  NECK: soft, Supple, No JVD,   CHEST/LUNG: Clear to auscultate bilaterally; No wheezing  HEART: S1S2+, Regular rate and rhythm; No murmurs  ABDOMEN: Soft, Nontender, Nondistended; Bowel sounds present  EXTREMITIES:  1+ Peripheral Pulses,  left Knee dressing C/D/I  SKIN: No rashes or lesions  NEURO: AAOX3, no focal deficits, no motor  PSYCH: normal mood    I&O's Detail    29 Sep 2021 07:01  -  30 Sep 2021 07:00  --------------------------------------------------------  IN:    sodium chloride 0.9%: 625 mL    sodium chloride 0.9%: 450 mL  Total IN: 1075 mL    OUT:    Voided (mL): 700 mL  Total OUT: 700 mL    Total NET: 375 mL                                    10.8   9.93  )-----------( 241      ( 30 Sep 2021 06:49 )             34.7     30 Sep 2021 06:49    136    |  102    |  16.0   ----------------------------<  198    4.6     |  20.0   |  1.04     Ca    8.5        30 Sep 2021 06:49        CAPILLARY BLOOD GLUCOSE      POCT Blood Glucose.: 203 mg/dL (30 Sep 2021 07:51)  POCT Blood Glucose.: 183 mg/dL (29 Sep 2021 22:50)  POCT Blood Glucose.: 204 mg/dL (29 Sep 2021 17:41)  POCT Blood Glucose.: 220 mg/dL (29 Sep 2021 12:24)          MEDICATIONS  (STANDING):  acetaminophen   Tablet .. 975 milliGRAM(s) Oral every 8 hours  aspirin 325 milliGRAM(s) Oral two times a day  atorvastatin 20 milliGRAM(s) Oral at bedtime  dextrose 40% Gel 15 Gram(s) Oral once  dextrose 5%. 1000 milliLiter(s) (50 mL/Hr) IV Continuous <Continuous>  dextrose 5%. 1000 milliLiter(s) (100 mL/Hr) IV Continuous <Continuous>  dextrose 50% Injectable 25 Gram(s) IV Push once  dextrose 50% Injectable 12.5 Gram(s) IV Push once  dextrose 50% Injectable 25 Gram(s) IV Push once  escitalopram 20 milliGRAM(s) Oral daily  glucagon  Injectable 1 milliGRAM(s) IntraMuscular once  influenza   Vaccine 0.5 milliLiter(s) IntraMuscular once  insulin lispro (ADMELOG) corrective regimen sliding scale   SubCutaneous three times a day before meals  insulin lispro (ADMELOG) corrective regimen sliding scale   SubCutaneous at bedtime  levothyroxine 50 MICROGram(s) Oral daily  metoprolol tartrate 50 milliGRAM(s) Oral two times a day  multivitamin 1 Tablet(s) Oral daily  pantoprazole    Tablet 40 milliGRAM(s) Oral before breakfast  polyethylene glycol 3350 17 Gram(s) Oral at bedtime  senna 2 Tablet(s) Oral at bedtime  sodium chloride 0.9%. 1000 milliLiter(s) (75 mL/Hr) IV Continuous <Continuous>    MEDICATIONS  (PRN):  HYDROmorphone   Tablet 4 milliGRAM(s) Oral every 3 hours PRN Severe Pain (7 - 10)  magnesium hydroxide Suspension 30 milliLiter(s) Oral daily PRN Constipation  ondansetron Injectable 4 milliGRAM(s) IV Push every 6 hours PRN Nausea and/or Vomiting  oxyCODONE    IR 5 milliGRAM(s) Oral every 3 hours PRN Mild Pain (1 - 3)  oxyCODONE    IR 10 milliGRAM(s) Oral every 3 hours PRN Moderate Pain (4 - 6)      RADIOLOGY & ADDITIONAL TESTS:

## 2021-10-06 ENCOUNTER — APPOINTMENT (OUTPATIENT)
Dept: CARDIOLOGY | Facility: CLINIC | Age: 81
End: 2021-10-06

## 2021-10-20 ENCOUNTER — APPOINTMENT (OUTPATIENT)
Dept: ORTHOPEDIC SURGERY | Facility: CLINIC | Age: 81
End: 2021-10-20
Payer: MEDICARE

## 2021-10-20 VITALS
DIASTOLIC BLOOD PRESSURE: 74 MMHG | HEART RATE: 77 BPM | HEIGHT: 65 IN | WEIGHT: 275 LBS | BODY MASS INDEX: 45.82 KG/M2 | SYSTOLIC BLOOD PRESSURE: 138 MMHG

## 2021-10-20 PROCEDURE — 73562 X-RAY EXAM OF KNEE 3: CPT | Mod: LT

## 2021-10-20 PROCEDURE — 99024 POSTOP FOLLOW-UP VISIT: CPT

## 2021-10-20 NOTE — HISTORY OF PRESENT ILLNESS
[Procedure: ___] : status post [unfilled] [4] : the patient reports pain that is 4/10 in severity [Clean/Dry/Intact] : clean, dry and intact [Healed] : healed [Swelling] : swollen [Neuro Intact] : an unremarkable neurological exam [Vascular Intact] : ~T peripheral vascular exam normal [Negative Dre's] : maneuvers demonstrated a negative Dre's sign [Xray (Date:___)] : [unfilled] Xray -  [Doing Well] : is doing well [No Sign of Infection] : is showing no signs of infection [Adequate Pain Control] : has adequate pain control [Chills] : no chills [Constipation] : no constipation [Diarrhea] : no diarrhea [Dysuria] : no dysuria [Fever] : no fever [Nausea] : no nausea [Vomiting] : no vomiting [Erythema] : not erythematous [Discharge] : absent of discharge [Dehiscence] : not dehisced [de-identified] : S/P Left total knee arthroplasty.\par  Computer-assisted tibial resection, [facility]Align procedure, DOS: 9/28/21.\par S/P Right total knee replacement DOS: April 7, 2015. \par  [de-identified] : Pt is 3 weeks post-op. She is taking 1-2 tabs of oxycodone and notes that she ran out of it. She is in home PT. She will only be able to go to outpatient PT once a week because of her insurance. She ambulates with a walker. She wishes that she was healing faster.  [de-identified] : Left knee exam shows healing incision with no sign of infection. ROM 0-100 degrees.  [de-identified] : 3V xray of the left knee done in the office today and reviewed by Dr. Nicholas Davidson demonstrates s/p implants in good positioning with no evidence of wear, loosening, or subsidence.  [de-identified] : Pt should continue taking aspirin BID for DVTP. We prescribed outpatient PT and oxycodone. She should continue to do low impact exercises. Pt understands the importance of prophylaxis for invasive dental procedures. F/u with us in 3 weeks.

## 2021-10-20 NOTE — END OF VISIT
[FreeTextEntry3] : I, Drayl Merlos, acted solely as a scribe for Dr. Nicholas Davidson on this date 10/20/2021.

## 2021-10-29 ENCOUNTER — NON-APPOINTMENT (OUTPATIENT)
Age: 81
End: 2021-10-29

## 2021-10-29 ENCOUNTER — APPOINTMENT (OUTPATIENT)
Dept: CARDIOLOGY | Facility: CLINIC | Age: 81
End: 2021-10-29
Payer: MEDICARE

## 2021-10-29 VITALS
HEIGHT: 65 IN | BODY MASS INDEX: 44.98 KG/M2 | WEIGHT: 270 LBS | TEMPERATURE: 98 F | DIASTOLIC BLOOD PRESSURE: 76 MMHG | SYSTOLIC BLOOD PRESSURE: 142 MMHG | HEART RATE: 73 BPM | OXYGEN SATURATION: 94 %

## 2021-10-29 PROCEDURE — 99214 OFFICE O/P EST MOD 30 MIN: CPT

## 2021-10-29 PROCEDURE — 93000 ELECTROCARDIOGRAM COMPLETE: CPT

## 2021-10-29 RX ORDER — OXYCODONE 5 MG/1
5 TABLET ORAL EVERY 4 HOURS
Qty: 30 | Refills: 0 | Status: DISCONTINUED | COMMUNITY
Start: 2021-10-20 | End: 2021-10-29

## 2021-10-29 RX ORDER — OXYCODONE 5 MG/1
5 TABLET ORAL EVERY 4 HOURS
Qty: 42 | Refills: 0 | Status: DISCONTINUED | COMMUNITY
Start: 2021-10-05 | End: 2021-10-29

## 2021-11-11 ENCOUNTER — TRANSCRIPTION ENCOUNTER (OUTPATIENT)
Age: 81
End: 2021-11-11

## 2021-11-16 ENCOUNTER — APPOINTMENT (OUTPATIENT)
Dept: ORTHOPEDIC SURGERY | Facility: CLINIC | Age: 81
End: 2021-11-16
Payer: MEDICARE

## 2021-11-16 VITALS
SYSTOLIC BLOOD PRESSURE: 138 MMHG | HEART RATE: 76 BPM | HEIGHT: 65 IN | BODY MASS INDEX: 44.98 KG/M2 | WEIGHT: 270 LBS | DIASTOLIC BLOOD PRESSURE: 69 MMHG

## 2021-11-16 DIAGNOSIS — Z47.1 AFTERCARE FOLLOWING JOINT REPLACEMENT SURGERY: ICD-10-CM

## 2021-11-16 PROCEDURE — 99024 POSTOP FOLLOW-UP VISIT: CPT

## 2021-11-16 PROCEDURE — 73562 X-RAY EXAM OF KNEE 3: CPT | Mod: 26,LT

## 2021-11-16 NOTE — HISTORY OF PRESENT ILLNESS
[Healed] : healed [Xray (Date:___)] : [unfilled] Xray -  [Doing Well] : is doing well [No Sign of Infection] : is showing no signs of infection [Adequate Pain Control] : has adequate pain control [de-identified] : S/P Left total knee arthroplasty. Computer-assisted tibial resection, OrthAlign procedure. DOS: 9/28/21 [de-identified] : pt is 7 weeks from left TKA\par  in PT\par  walking without a cane\par she has mild pain in inner shin but not in need for pain medication\par she is happy with her progress [de-identified] : Left knee exam shows healing incision with no sign of infection. ROM 0-100 degrees. The surgical incision site(s) swollen, but clean, dry and intact, healed, not erythematous, absent of discharge and not dehisced. Additional findings included an unremarkable neurological exam, peripheral vascular exam normal and maneuvers demonstrated a negative Dre's sign. \par  [de-identified] : 3V xray of the left knee done in the office today and  demonstrates s/p implants in good positioning with no evidence of wear, loosening, or subsidence. \par  [de-identified] : She should continue to do low impact exercises. Pt understands the importance of prophylaxis for invasive dental procedures. F/u with us in 3 weeks. \par

## 2021-11-29 ENCOUNTER — APPOINTMENT (OUTPATIENT)
Dept: ENDOCRINOLOGY | Facility: CLINIC | Age: 81
End: 2021-11-29
Payer: MEDICARE

## 2021-11-29 VITALS — OXYGEN SATURATION: 93 % | SYSTOLIC BLOOD PRESSURE: 140 MMHG | DIASTOLIC BLOOD PRESSURE: 72 MMHG | HEART RATE: 71 BPM

## 2021-11-29 VITALS — WEIGHT: 273 LBS | BODY MASS INDEX: 45.48 KG/M2 | HEIGHT: 65 IN

## 2021-11-29 PROCEDURE — 99215 OFFICE O/P EST HI 40 MIN: CPT

## 2021-11-29 RX ORDER — TRAMADOL HYDROCHLORIDE 50 MG/1
50 TABLET, COATED ORAL DAILY
Qty: 20 | Refills: 0 | Status: DISCONTINUED | COMMUNITY
Start: 2021-08-31 | End: 2021-11-29

## 2021-11-29 NOTE — ASSESSMENT
[Carbohydrate Consistent Diet] : carbohydrate consistent diet [FreeTextEntry1] : Dm2  in patient with morbid obesity and HTN and HLD.  She just had knee replacement surgery. \par \par Dm2:  bgs 130's .Recent A1c 6.9. poor diet habits and encouraged to cut down carbs   \par continue metformin 1000 mg BID \par continue glimepiride 2 mg qd \par CKD with diabetic nephropathy.: monitor GFR \par continue ASA \par eye exam every 6 mos. \par referral to nephrology for monitoring and see if we can add ACEI / ARB but she declined \par \par NTMNG : check thyroid US .She does not got for studies \par s/p FNA LEFT  nodule 1.3 cm nondiagnostic nodule in [facility] 2018.\par Images revised and has rim calcifications that can lead to FNA being nondiagnostic results again. \par Also considering her age , we decided to monitor for now and repeat US but she is not going. \par \par hypoT : check tfts next time \par continue LT4 50 mcg \par Take daily in AM on an empty stomach at least 30 minutes before eating or taking other medication.\par \par obesity: continue with PT for her knee \par \par HTN :  bp at target on meds. Continue current management.\par I advised low fat/low cholesterol diet, low salt diet, and weight loss\par \par HLD: LDL at target.\par low fat/low cholesterol diet and weight loss advised\par continue atorvastatin 20 mg qd \par \par osteoporosis:  DXA scan with normal BMD / i suspect that's due to arthritic changes \par continue vitamin d  \par continue MVi daily \par \par

## 2021-11-29 NOTE — PHYSICAL EXAM
[Alert] : alert [Well Nourished] : well nourished [Obese] : obese [No Acute Distress] : no acute distress [Well Developed] : well developed [Normal Sclera/Conjunctiva] : normal sclera/conjunctiva [EOMI] : extra ocular movement intact [No Proptosis] : no proptosis [Normal Oropharynx] : the oropharynx was normal [Thyroid Not Enlarged] : the thyroid was not enlarged [No Thyroid Nodules] : no palpable thyroid nodules [No Respiratory Distress] : no respiratory distress [No Accessory Muscle Use] : no accessory muscle use [Clear to Auscultation] : lungs were clear to auscultation bilaterally [Normal S1, S2] : normal S1 and S2 [Normal Rate] : heart rate was normal [Regular Rhythm] : with a regular rhythm [No Edema] : no peripheral edema [Pedal Pulses Normal] : the pedal pulses are present [Normal Bowel Sounds] : normal bowel sounds [Not Tender] : non-tender [Not Distended] : not distended [Soft] : abdomen soft [Normal Anterior Cervical Nodes] : no anterior cervical lymphadenopathy [No Tremors] : no tremors [Oriented x3] : oriented to person, place, and time [Acanthosis Nigricans] : no acanthosis nigricans

## 2021-12-16 ENCOUNTER — APPOINTMENT (OUTPATIENT)
Dept: FAMILY MEDICINE | Facility: CLINIC | Age: 81
End: 2021-12-16

## 2021-12-27 ENCOUNTER — APPOINTMENT (OUTPATIENT)
Dept: ORTHOPEDIC SURGERY | Facility: CLINIC | Age: 81
End: 2021-12-27
Payer: MEDICARE

## 2021-12-27 PROCEDURE — 73562 X-RAY EXAM OF KNEE 3: CPT | Mod: 50

## 2021-12-27 PROCEDURE — 20610 DRAIN/INJ JOINT/BURSA W/O US: CPT | Mod: 79,RT

## 2021-12-27 PROCEDURE — 99213 OFFICE O/P EST LOW 20 MIN: CPT | Mod: 24,25

## 2021-12-28 NOTE — PROCEDURE
[de-identified] : I injected the patient's right pes bursa today with cortisone.\par \par I discussed at length with the patient the planned steroid and lidocaine injection. The risks, benefits, convalescence and alternatives were reviewed. The possible side effects discussed included but were not limited to: pain, swelling, heat, bleeding, and redness. Symptoms are generally mild but if they are extensive then contact the office. Giving pain relievers by mouth such as NSAIDs or Tylenol can generally treat the reactions to steroid and lidocaine. Rare cases of infection have been noted. Rash, hives and itching may occur post injection. If you have muscle pain or cramps, flushing and or swelling of the face, rapid heart beat, nausea, dizziness, fever, chills, headache, difficulty breathing, swelling in the arms or legs, or have a prickly feeling of your skin, contact a health care provider immediately. Following this discussion, the knee was prepped with Alcohol and under sterile condition the 80 mg Depo-Medrol and 6 cc Lidocaine injection was performed with a 20 gauge needle through a superolateral injection site. The needle was introduced into the joint, aspiration was performed to ensure intra-articular placement and the medication was injected. Upon withdrawal of the needle the site was cleaned with alcohol and a band aid applied. The patient tolerated the injection well and there were no adverse effects. Post injection instructions included no strenuous activity for 24 hours, cryotherapy and if there are any adverse effects to contact the office.

## 2021-12-28 NOTE — PROCEDURE
82 yo man with history of HTN, a fib (on Coumadin) and hypothyroidism. Patient has been admitted to [facility] for left temp partieal infarct on 1/10. On 1/14 patient with worsening of deficits: aphasia, right side plegia and not following commands. Telestroke consult was completed by Dr. Olivares, no tpa given as INR was 2.0. Patient transferred to [facility] [place] ED for stat CTA stroke MP.  Positive for LVO, L M1 occlusion. Patient not candidate for intervention d/t large core infarct. Patient was admitted to [facility] for close monitoring.  Coumadin held d/t size of stroke. Will switch to DOAC and start after PEG placement.     Repeat CT head completed today stable.     Antithrombotics: currently held, to start after PEG placement      Statins: Lipitor 40 mg daily    Aggressive risk factor modification: A-Fib, HTN     Rehab efforts: The patient has been evaluated by a stroke team provider and the therapy needs have been fully considered based off the presenting complaints and exam findings. The following therapy evaluations are needed: PT evaluate and treat, OT evaluate and treat, SLP evaluate and treat, PM&R evaluate for appropriate placement    Diagnostics ordered/pending: None     VTE prophylaxis: None: Hold pharmacologic prophylaxis in setting of large infarct. SCDs.    BP parameters: Infarct: No intervention, SBP <220   [de-identified] : I injected the patient's right pes bursa today with cortisone.\par \par I discussed at length with the patient the planned steroid and lidocaine injection. The risks, benefits, convalescence and alternatives were reviewed. The possible side effects discussed included but were not limited to: pain, swelling, heat, bleeding, and redness. Symptoms are generally mild but if they are extensive then contact the office. Giving pain relievers by mouth such as NSAIDs or Tylenol can generally treat the reactions to steroid and lidocaine. Rare cases of infection have been noted. Rash, hives and itching may occur post injection. If you have muscle pain or cramps, flushing and or swelling of the face, rapid heart beat, nausea, dizziness, fever, chills, headache, difficulty breathing, swelling in the arms or legs, or have a prickly feeling of your skin, contact a health care provider immediately. Following this discussion, the knee was prepped with Alcohol and under sterile condition the 80 mg Depo-Medrol and 6 cc Lidocaine injection was performed with a 20 gauge needle through a superolateral injection site. The needle was introduced into the joint, aspiration was performed to ensure intra-articular placement and the medication was injected. Upon withdrawal of the needle the site was cleaned with alcohol and a band aid applied. The patient tolerated the injection well and there were no adverse effects. Post injection instructions included no strenuous activity for 24 hours, cryotherapy and if there are any adverse effects to contact the office.

## 2021-12-28 NOTE — DISCUSSION/SUMMARY
[de-identified] : 81 year old female s/p left TKA 9/28/2021 and right TKA in 2015 with right pes bursitis. We reviewed non-operative and operative treatment. At this point I suggested physiotherapy, NSAIDs and/or Tylenol, and cortisone injections.  At this point she elected to receive a right knee steroid injection to the pes bursa which she tolerated well. She will continue home exercises and therapy for her left knee.  I discussed the importance of antibiotic use with any dental work. She will follow up in 3 months. \par \par A knee replacement means resurfacing of all 3 surfaces of the patella, the femur, and tibia with metal and plastic parts. The prosthetic parts are usually cemented into position and well outpatient range of motion from full extension to about 120° of flexion. The postoperative motion, however, is determined by multiple factors, the most important of which is preoperative motion. In general, the better motion preoperatively, the better the motion postoperatively. The operation, pending medical clearance, gently requires hospitalization of 3-4 days for one knee, 5-7 days for bilateral knee replacements. In general, we prefer to perform the procedure under spinal anesthesia with femoral nerve block and occasional single shot sciatic nerve block. We may ask a patient to give 2 units of blood for bilateral total knee arthroplasties, for one knee we institute a normogram which may include administration of preoperative Procrit. The operative procedure takes probably 1-2 hours. The operation requires a straight incision anywhere from 5-7 inches down from the knee. Postoperatively, the patient is positioned in a continuous passive motion machine within the first 24 hours and is walking the day after surgery. The first couple days are very painful and the pain medication will alleviate, but not eliminate the pain. The patient must really push hard to get range of motion. Our goal for having a person go home as that the range of motion is approximately 0-90° of flexion and that they can walk with a walker or cane. A walking aid is to be dispensed once the patient is secure enough. In general there, there is no cast or brace required with routine knee replacement. In the long term, we do not encourage our patients to run for the sake of running, although pending their preoperative status, we often allow patient to play doubles tennis or comparative activities. We also allowed them to do gentle intermediate downhill skiing if they are truly an expert skier. Biking is encouraged as well swimming. The followup periods are usually 3 weeks, 6 weeks, 3 months, and yearly intervals. Potential complications with total knee replacement included anesthetic complications and death, infection around 1%, nerve damage, by which means peroneal nerve palsy, footdrop or flapping foot with ambulation. This is particularly more apt to occur in the patient with a valgus or knock-knee deformity. The incidence can be quite high in this particular patient population. There will be areas of skin numbness, but this is not an untoward effect nor do we consider it a complication. Other potential complications include dislocation of the patella component, usually less than 2%; loosening of the tibial or femoral component is much more infrequent. Most often this occurs with infection or long-term use. Patient of extreme risk including markedly overweight patient's may be more prone to prosthetic wear. Major blood vessel damage is also extremely rare. Directly because of the anatomic proximity of the popliteal artery this could be lacerated with subsequent repair required. Be it unlikely, disruption of the popliteal artery could theoretically result in amputation. Similarly, infection could theoretically result in amputation if one were to grow out of an organism that cannot be controlled with antibiotics. General medical complications include phlebitis, for which we would prophylactically anticoagulate patients, but could still occur, and fatal pulmonary embolus which has been reported. Cardiovascular problems, such as heart attack or ischemia are always a concern with such hemodynamic changes in the blood vascular system. Other General complications are rare, but anything medicine could theoretically happen. I think the patient understands the risk benefit ratio of total knee replacement and will think about whether there like to pursue with an operation or nonoperative treatment program. f

## 2021-12-28 NOTE — ADDENDUM
[FreeTextEntry1] : I, Jc Shin, acted solely as a scribe for Dr. Nicholas Davidson on this date 12/27/2021 .

## 2021-12-28 NOTE — PHYSICAL EXAM
[de-identified] : GENERAL APPEARANCE: Well nourished and hydrated, pleasant, alert, and oriented x 3. Appears their stated age. \par HEENT: Normocephalic, extraocular eye motion intact. Nasal septum midline. Oral cavity clear. External auditory canal clear. \par RESPIRATORY: Breath sounds clear and audible in all lobes. No wheezing, No accessory muscle use.\par CARDIOVASCULAR: No apparent abnormalities. No lower leg edema. No varicosities. Pedal pulses are palpable.\par NEUROLOGIC: Sensation is normal, no muscle weakness in the upper or lower extremities.\par DERMATOLOGIC: No apparent skin lesions, moist, warm, no rash.\par SPINE: Cervical spine appears normal and moves freely; thoracic spine appears normal and moves freely; lumbosacral spine appears normal and moves freely, normal, nontender.\par MUSCULOSKELETAL: Hands, wrists, and elbows are normal and move freely, shoulders are normal and move freely. \par Musculoskeletal: Gait: normal and not antalgic . Examination of right knee demonstrates healed midline incision, no obvious signs of infection, no joint effusion, no erythema, smooth and active full range of motion without pain 0- 120 degrees, tenderness over the pes anserine bursa. \par \par Left knee exam shows healed incision with no sign of infection. ROM 0-110 degrees. The surgical incision is not swollen, clean, dry and intact, healed, not erythematous, absent of discharge and not dehisced. Additional findings included an unremarkable neurological exam, peripheral vascular exam normal and maneuvers demonstrated a negative Dre's sign. \par Right knee exam shows healed incision with no sign of infection. ROM 0-110 degrees. The surgical incision is not swollen, clean, dry and intact, healed, not erythematous, absent of discharge and not dehisced. Additional findings included an unremarkable neurological exam, peripheral vascular exam normal and maneuvers demonstrated a negative Dre's sign. Significant tenderness to the pes bursa.  [de-identified] : 3 views of b.l knee xrays  done in the office today and demonstrates s/p tka implants in good positioning with no evidence of wear, loosening, or subsidence. \par  \par

## 2021-12-28 NOTE — HISTORY OF PRESENT ILLNESS
[2] : the patient reports pain that is 2/10 in severity [___ Weeks Post Op] : [unfilled] weeks post op [Healed] : healed [Xray (Date:___)] : [unfilled] Xray -  [Doing Well] : is doing well [No Sign of Infection] : is showing no signs of infection [Adequate Pain Control] : has adequate pain control [Pain Location] : pain [3] : a current pain level of 3/10 [Direct Pressure] : worsened by direct pressure [Walking] : worsened by walking [Chills] : no chills [Constipation] : no constipation [Diarrhea] : no diarrhea [Dysuria] : no dysuria [Fever] : no fever [Nausea] : no nausea [Vomiting] : no vomiting [de-identified] : S/P Left total knee arthroplasty. Computer-assisted tibial resection, OrthAlign procedure. DOS: 9/28/21 [de-identified] : Left knee exam shows healing incision with no sign of infection. ROM 0-100 degrees. The surgical incision site(s) swollen, but clean, dry and intact, healed, not erythematous, absent of discharge and not dehisced. Additional findings included an unremarkable neurological exam, peripheral vascular exam normal and maneuvers demonstrated a negative Dre's sign. \par  [de-identified] : Encouraged low impact exercises. Pt understands the importance of prophylaxis for invasive dental procedures. \par Pt will FU in 3 months.  [de-identified] : 3V xray of the left knee done in the office today and  demonstrates s/p implants in good positioning with no evidence of wear, loosening, or subsidence.  [de-identified] : S/P Left total knee arthroplasty. Computer-assisted tibial resection, OrthAlign procedure. DOS: 9/28/21. \par She is in PT once a week. Reports a 3/10 pain. Ambulating freely. Still complaining of right anterior knee pain. No fever or chills\par She also report right knee pain, worse with weightbearing and walking. S/p right total knee replacement DOS: April 7, 2015. States she is limping. Denies hip pain.  [de-identified] : injections

## 2021-12-28 NOTE — DISCUSSION/SUMMARY
[de-identified] : 81 year old female s/p left TKA 9/28/2021 and right TKA in 2015 with right pes bursitis. We reviewed non-operative and operative treatment. At this point I suggested physiotherapy, NSAIDs and/or Tylenol, and cortisone injections.  At this point she elected to receive a right knee steroid injection to the pes bursa which she tolerated well. She will continue home exercises and therapy for her left knee.  I discussed the importance of antibiotic use with any dental work. She will follow up in 3 months. \par \par A knee replacement means resurfacing of all 3 surfaces of the patella, the femur, and tibia with metal and plastic parts. The prosthetic parts are usually cemented into position and well outpatient range of motion from full extension to about 120° of flexion. The postoperative motion, however, is determined by multiple factors, the most important of which is preoperative motion. In general, the better motion preoperatively, the better the motion postoperatively. The operation, pending medical clearance, gently requires hospitalization of 3-4 days for one knee, 5-7 days for bilateral knee replacements. In general, we prefer to perform the procedure under spinal anesthesia with femoral nerve block and occasional single shot sciatic nerve block. We may ask a patient to give 2 units of blood for bilateral total knee arthroplasties, for one knee we institute a normogram which may include administration of preoperative Procrit. The operative procedure takes probably 1-2 hours. The operation requires a straight incision anywhere from 5-7 inches down from the knee. Postoperatively, the patient is positioned in a continuous passive motion machine within the first 24 hours and is walking the day after surgery. The first couple days are very painful and the pain medication will alleviate, but not eliminate the pain. The patient must really push hard to get range of motion. Our goal for having a person go home as that the range of motion is approximately 0-90° of flexion and that they can walk with a walker or cane. A walking aid is to be dispensed once the patient is secure enough. In general there, there is no cast or brace required with routine knee replacement. In the long term, we do not encourage our patients to run for the sake of running, although pending their preoperative status, we often allow patient to play doubles tennis or comparative activities. We also allowed them to do gentle intermediate downhill skiing if they are truly an expert skier. Biking is encouraged as well swimming. The followup periods are usually 3 weeks, 6 weeks, 3 months, and yearly intervals. Potential complications with total knee replacement included anesthetic complications and death, infection around 1%, nerve damage, by which means peroneal nerve palsy, footdrop or flapping foot with ambulation. This is particularly more apt to occur in the patient with a valgus or knock-knee deformity. The incidence can be quite high in this particular patient population. There will be areas of skin numbness, but this is not an untoward effect nor do we consider it a complication. Other potential complications include dislocation of the patella component, usually less than 2%; loosening of the tibial or femoral component is much more infrequent. Most often this occurs with infection or long-term use. Patient of extreme risk including markedly overweight patient's may be more prone to prosthetic wear. Major blood vessel damage is also extremely rare. Directly because of the anatomic proximity of the popliteal artery this could be lacerated with subsequent repair required. Be it unlikely, disruption of the popliteal artery could theoretically result in amputation. Similarly, infection could theoretically result in amputation if one were to grow out of an organism that cannot be controlled with antibiotics. General medical complications include phlebitis, for which we would prophylactically anticoagulate patients, but could still occur, and fatal pulmonary embolus which has been reported. Cardiovascular problems, such as heart attack or ischemia are always a concern with such hemodynamic changes in the blood vascular system. Other General complications are rare, but anything medicine could theoretically happen. I think the patient understands the risk benefit ratio of total knee replacement and will think about whether there like to pursue with an operation or nonoperative treatment program. f

## 2021-12-28 NOTE — PHYSICAL EXAM
[de-identified] : GENERAL APPEARANCE: Well nourished and hydrated, pleasant, alert, and oriented x 3. Appears their stated age. \par HEENT: Normocephalic, extraocular eye motion intact. Nasal septum midline. Oral cavity clear. External auditory canal clear. \par RESPIRATORY: Breath sounds clear and audible in all lobes. No wheezing, No accessory muscle use.\par CARDIOVASCULAR: No apparent abnormalities. No lower leg edema. No varicosities. Pedal pulses are palpable.\par NEUROLOGIC: Sensation is normal, no muscle weakness in the upper or lower extremities.\par DERMATOLOGIC: No apparent skin lesions, moist, warm, no rash.\par SPINE: Cervical spine appears normal and moves freely; thoracic spine appears normal and moves freely; lumbosacral spine appears normal and moves freely, normal, nontender.\par MUSCULOSKELETAL: Hands, wrists, and elbows are normal and move freely, shoulders are normal and move freely. \par Musculoskeletal: Gait: normal and not antalgic . Examination of right knee demonstrates healed midline incision, no obvious signs of infection, no joint effusion, no erythema, smooth and active full range of motion without pain 0- 120 degrees, tenderness over the pes anserine bursa. \par \par Left knee exam shows healed incision with no sign of infection. ROM 0-110 degrees. The surgical incision is not swollen, clean, dry and intact, healed, not erythematous, absent of discharge and not dehisced. Additional findings included an unremarkable neurological exam, peripheral vascular exam normal and maneuvers demonstrated a negative Dre's sign. \par Right knee exam shows healed incision with no sign of infection. ROM 0-110 degrees. The surgical incision is not swollen, clean, dry and intact, healed, not erythematous, absent of discharge and not dehisced. Additional findings included an unremarkable neurological exam, peripheral vascular exam normal and maneuvers demonstrated a negative Dre's sign. Significant tenderness to the pes bursa.  [de-identified] : 3 views of b.l knee xrays  done in the office today and demonstrates s/p tka implants in good positioning with no evidence of wear, loosening, or subsidence. \par  \par

## 2021-12-28 NOTE — HISTORY OF PRESENT ILLNESS
[2] : the patient reports pain that is 2/10 in severity [___ Weeks Post Op] : [unfilled] weeks post op [Healed] : healed [Xray (Date:___)] : [unfilled] Xray -  [Doing Well] : is doing well [No Sign of Infection] : is showing no signs of infection [Adequate Pain Control] : has adequate pain control [Pain Location] : pain [3] : a current pain level of 3/10 [Direct Pressure] : worsened by direct pressure [Walking] : worsened by walking [Chills] : no chills [Constipation] : no constipation [Diarrhea] : no diarrhea [Dysuria] : no dysuria [Fever] : no fever [Nausea] : no nausea [Vomiting] : no vomiting [de-identified] : S/P Left total knee arthroplasty. Computer-assisted tibial resection, OrthAlign procedure. DOS: 9/28/21 [de-identified] : Left knee exam shows healing incision with no sign of infection. ROM 0-100 degrees. The surgical incision site(s) swollen, but clean, dry and intact, healed, not erythematous, absent of discharge and not dehisced. Additional findings included an unremarkable neurological exam, peripheral vascular exam normal and maneuvers demonstrated a negative Dre's sign. \par  [de-identified] : Encouraged low impact exercises. Pt understands the importance of prophylaxis for invasive dental procedures. \par Pt will FU in 3 months.  [de-identified] : 3V xray of the left knee done in the office today and  demonstrates s/p implants in good positioning with no evidence of wear, loosening, or subsidence.  [de-identified] : S/P Left total knee arthroplasty. Computer-assisted tibial resection, OrthAlign procedure. DOS: 9/28/21. \par She is in PT once a week. Reports a 3/10 pain. Ambulating freely. Still complaining of right anterior knee pain. No fever or chills\par She also report right knee pain, worse with weightbearing and walking. S/p right total knee replacement DOS: April 7, 2015. States she is limping. Denies hip pain.  [de-identified] : injections

## 2022-01-03 ENCOUNTER — APPOINTMENT (OUTPATIENT)
Dept: FAMILY MEDICINE | Facility: CLINIC | Age: 82
End: 2022-01-03
Payer: MEDICARE

## 2022-01-03 ENCOUNTER — NON-APPOINTMENT (OUTPATIENT)
Age: 82
End: 2022-01-03

## 2022-01-03 VITALS
SYSTOLIC BLOOD PRESSURE: 142 MMHG | WEIGHT: 271 LBS | BODY MASS INDEX: 45.15 KG/M2 | DIASTOLIC BLOOD PRESSURE: 70 MMHG | HEART RATE: 78 BPM | HEIGHT: 65 IN

## 2022-01-03 PROCEDURE — 99214 OFFICE O/P EST MOD 30 MIN: CPT

## 2022-01-07 NOTE — HISTORY OF PRESENT ILLNESS
[FreeTextEntry1] : PRESCRIPTIONS [de-identified] : MS. MERIDA IS A PLEASANT 82 YO PRESENTING FOR FOLLOW-UP.  SHE HAS A CHRONIC HISTORY OF HYPERTENSION, HYPERLIPIDEMIA, NON-ISCHEMIC CARDIOMYOPATHY, HYPOTHYROIDISM, DIABETES, DEPRESSION AND OBESITY.  LEFT KNEE REPLACEMENT WENT WELL.  IS SEEING ORTHOPEDICS FOR KNEE.  GETTING PHYSICAL THERAPY ONCE A WEEK FOR LEFT KNEE.  GETTING INJECTION SIN RIGHT KNEE.  NO FALLS.  IS ON LEXAPRO 20 MG DAILY.  NO PANIC ATTACKS.  NO SUICIDAL/HOMICIDAL IDEATIONS OR PLANS.  HAS A GOOD SUPPORT SYSTEM.  REMAINS ON LIPITOR FOR CHOLESTEROL.  BLOOD PRESSURE CONTROLLED.

## 2022-01-07 NOTE — PLAN
[FreeTextEntry1] : BLOOD PRESSURE CONTROLLED\par HEALTHY DIET AND LIFESTYLE MODIFICATIONS\par HEALTHY WEIGHT LOSS \par PRIOR LAB WORK REVIEWED\par WILL MONITOR LIPIDS AND HBA1C\par ORTHOPEDICS CONSULTANT NOTE APPRECIATED\par STRESS REDUCTION, GOOD SUPPORT SYSTEM\par FOLLOW-UP ALL SPECIALISTS AS DIRECTED \par CALL WITH ANY QUESTIONS, CONCERNS OR CHANGES

## 2022-01-07 NOTE — PHYSICAL EXAM
[No Acute Distress] : no acute distress [Well Nourished] : well nourished [Well-Appearing] : well-appearing [No Respiratory Distress] : no respiratory distress  [No Accessory Muscle Use] : no accessory muscle use [Clear to Auscultation] : lungs were clear to auscultation bilaterally [Normal Rate] : normal rate  [Regular Rhythm] : with a regular rhythm [Normal S1, S2] : normal S1 and S2 [Coordination Grossly Intact] : coordination grossly intact [No Focal Deficits] : no focal deficits [Speech Grossly Normal] : speech grossly normal [Memory Grossly Normal] : memory grossly normal [Normal Mood] : the mood was normal

## 2022-01-27 ENCOUNTER — APPOINTMENT (OUTPATIENT)
Dept: ENDOCRINOLOGY | Facility: CLINIC | Age: 82
End: 2022-01-27

## 2022-02-28 ENCOUNTER — APPOINTMENT (OUTPATIENT)
Dept: ULTRASOUND IMAGING | Facility: CLINIC | Age: 82
End: 2022-02-28
Payer: MEDICARE

## 2022-02-28 ENCOUNTER — OUTPATIENT (OUTPATIENT)
Dept: OUTPATIENT SERVICES | Facility: HOSPITAL | Age: 82
LOS: 1 days | End: 2022-02-28
Payer: MEDICARE

## 2022-02-28 DIAGNOSIS — Z98.49 CATARACT EXTRACTION STATUS, UNSPECIFIED EYE: Chronic | ICD-10-CM

## 2022-02-28 DIAGNOSIS — Z90.49 ACQUIRED ABSENCE OF OTHER SPECIFIED PARTS OF DIGESTIVE TRACT: Chronic | ICD-10-CM

## 2022-02-28 DIAGNOSIS — Z98.89 OTHER SPECIFIED POSTPROCEDURAL STATES: Chronic | ICD-10-CM

## 2022-02-28 DIAGNOSIS — Z90.710 ACQUIRED ABSENCE OF BOTH CERVIX AND UTERUS: Chronic | ICD-10-CM

## 2022-02-28 DIAGNOSIS — Z96.651 PRESENCE OF RIGHT ARTIFICIAL KNEE JOINT: Chronic | ICD-10-CM

## 2022-02-28 DIAGNOSIS — E04.1 NONTOXIC SINGLE THYROID NODULE: ICD-10-CM

## 2022-02-28 PROCEDURE — 76536 US EXAM OF HEAD AND NECK: CPT | Mod: 26

## 2022-02-28 PROCEDURE — 76536 US EXAM OF HEAD AND NECK: CPT

## 2022-03-14 ENCOUNTER — APPOINTMENT (OUTPATIENT)
Dept: CARDIOLOGY | Facility: CLINIC | Age: 82
End: 2022-03-14

## 2022-03-15 LAB
ALBUMIN SERPL ELPH-MCNC: 4.6 G/DL
ALP BLD-CCNC: 96 U/L
ALT SERPL-CCNC: 20 U/L
ANION GAP SERPL CALC-SCNC: 18 MMOL/L
AST SERPL-CCNC: 19 U/L
BASOPHILS # BLD AUTO: 0.14 K/UL
BASOPHILS NFR BLD AUTO: 1.4 %
BILIRUB SERPL-MCNC: 0.5 MG/DL
BUN SERPL-MCNC: 25 MG/DL
CALCIUM SERPL-MCNC: 10.3 MG/DL
CHLORIDE SERPL-SCNC: 103 MMOL/L
CHOLEST SERPL-MCNC: 155 MG/DL
CO2 SERPL-SCNC: 21 MMOL/L
CREAT SERPL-MCNC: 1.07 MG/DL
EGFR: 53 ML/MIN/1.73M2
EOSINOPHIL # BLD AUTO: 0.38 K/UL
EOSINOPHIL NFR BLD AUTO: 3.9 %
ESTIMATED AVERAGE GLUCOSE: 148 MG/DL
GLUCOSE SERPL-MCNC: 178 MG/DL
HBA1C MFR BLD HPLC: 6.8 %
HCT VFR BLD CALC: 44.2 %
HDLC SERPL-MCNC: 56 MG/DL
HGB BLD-MCNC: 13.6 G/DL
IMM GRANULOCYTES NFR BLD AUTO: 0.3 %
LDLC SERPL CALC-MCNC: 81 MG/DL
LDLC SERPL DIRECT ASSAY-MCNC: 86 MG/DL
LYMPHOCYTES # BLD AUTO: 2.92 K/UL
LYMPHOCYTES NFR BLD AUTO: 29.8 %
MAN DIFF?: NORMAL
MCHC RBC-ENTMCNC: 28.5 PG
MCHC RBC-ENTMCNC: 30.8 GM/DL
MCV RBC AUTO: 92.5 FL
MONOCYTES # BLD AUTO: 0.65 K/UL
MONOCYTES NFR BLD AUTO: 6.6 %
NEUTROPHILS # BLD AUTO: 5.67 K/UL
NEUTROPHILS NFR BLD AUTO: 58 %
NONHDLC SERPL-MCNC: 99 MG/DL
PLATELET # BLD AUTO: 385 K/UL
POTASSIUM SERPL-SCNC: 5.3 MMOL/L
PROT SERPL-MCNC: 7.7 G/DL
RBC # BLD: 4.78 M/UL
RBC # FLD: 14.4 %
SODIUM SERPL-SCNC: 142 MMOL/L
TRIGL SERPL-MCNC: 91 MG/DL
WBC # FLD AUTO: 9.79 K/UL

## 2022-03-18 ENCOUNTER — APPOINTMENT (OUTPATIENT)
Dept: ENDOCRINOLOGY | Facility: CLINIC | Age: 82
End: 2022-03-18
Payer: MEDICARE

## 2022-03-18 VITALS — DIASTOLIC BLOOD PRESSURE: 70 MMHG | HEART RATE: 71 BPM | SYSTOLIC BLOOD PRESSURE: 130 MMHG | OXYGEN SATURATION: 93 %

## 2022-03-18 VITALS — WEIGHT: 271 LBS | HEIGHT: 65 IN | BODY MASS INDEX: 45.15 KG/M2

## 2022-03-18 PROCEDURE — 99214 OFFICE O/P EST MOD 30 MIN: CPT

## 2022-03-18 NOTE — PHYSICAL EXAM
[Alert] : alert [Well Nourished] : well nourished [Obese] : obese [No Acute Distress] : no acute distress [Well Developed] : well developed [Normal Sclera/Conjunctiva] : normal sclera/conjunctiva [EOMI] : extra ocular movement intact [No Proptosis] : no proptosis [Normal Oropharynx] : the oropharynx was normal [Thyroid Not Enlarged] : the thyroid was not enlarged [No Thyroid Nodules] : no palpable thyroid nodules [No Respiratory Distress] : no respiratory distress [No Accessory Muscle Use] : no accessory muscle use [Clear to Auscultation] : lungs were clear to auscultation bilaterally [Normal S1, S2] : normal S1 and S2 [Normal Rate] : heart rate was normal [Regular Rhythm] : with a regular rhythm [No Edema] : no peripheral edema [Pedal Pulses Normal] : the pedal pulses are present [Normal Bowel Sounds] : normal bowel sounds [Not Tender] : non-tender [Not Distended] : not distended [Soft] : abdomen soft [Normal Anterior Cervical Nodes] : no anterior cervical lymphadenopathy [Acanthosis Nigricans] : no acanthosis nigricans [No Tremors] : no tremors [Oriented x3] : oriented to person, place, and time

## 2022-03-18 NOTE — ASSESSMENT
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  She just had knee replacement surgery. \par \par Dm2: A1c 6.8. \par encouraged to cut down carbs   \par continue metformin 1000 mg BID \par continue glimepiride 2 mg qd \par CKD with diabetic nephropathy: will followup GFR \par continue ASA \par eye exam every 6 mos. \par referral to nephrology for monitoring and see if we can add ACEI / ARB but she declined \par \par NTMNG : declines compressivesx  \par s/p FNA LEFT  nodule 1.3 cm nondiagnostic nodule in [facility] 2018.\par Images revised and has rim calcifications that can lead to FNA being nondiagnostic results again. \par Repeat US shows some growth but it was due to calipers positioning. I don’t think its real growth. will repeat US in 6-8 mo. \par \par hypoT : check tfts next time \par continue LT4 50 mcg \par \par obesity: continue with PT for her knee \par \par HTN :  bp at target on meds. Continue current management.\par I advised low fat/low cholesterol diet, low salt diet, and weight loss\par \par HLD: lipids good. continue atorvastatin 20 mg qd \par low fat/low cholesterol diet and weight loss advised\par \par osteoporosis:  DXA scan with normal BMD / i suspect that's due to arthritic changes \par continue vitamin d  \par continue MVi daily \par \par

## 2022-03-30 ENCOUNTER — APPOINTMENT (OUTPATIENT)
Dept: ORTHOPEDIC SURGERY | Facility: CLINIC | Age: 82
End: 2022-03-30
Payer: MEDICARE

## 2022-03-30 VITALS
HEART RATE: 65 BPM | HEIGHT: 65 IN | BODY MASS INDEX: 45.15 KG/M2 | SYSTOLIC BLOOD PRESSURE: 124 MMHG | DIASTOLIC BLOOD PRESSURE: 72 MMHG | WEIGHT: 271 LBS

## 2022-03-30 DIAGNOSIS — M70.50 OTHER BURSITIS OF KNEE, UNSPECIFIED KNEE: ICD-10-CM

## 2022-03-30 PROCEDURE — 99214 OFFICE O/P EST MOD 30 MIN: CPT

## 2022-03-30 NOTE — PHYSICAL EXAM
[LE] : Sensory: Intact in bilateral lower extremities [ALL] : dorsalis pedis, posterior tibial, femoral, popliteal, and radial 2+ and symmetric bilaterally [Normal] : Alert and in no acute distress [Poor Appearance] : well-appearing [de-identified] : GENERAL APPEARANCE: Well nourished and hydrated, pleasant, alert, and oriented x 3. Appears their stated age. \par HEENT: Normocephalic, extraocular eye motion intact. Nasal septum midline. Oral cavity clear. External auditory canal clear. \par RESPIRATORY: Breath sounds clear and audible in all lobes. No wheezing, No accessory muscle use.\par CARDIOVASCULAR: No apparent abnormalities. No lower leg edema. No varicosities. Pedal pulses are palpable.\par NEUROLOGIC: Sensation is normal, no muscle weakness in the upper or lower extremities.\par DERMATOLOGIC: No apparent skin lesions, moist, warm, no rash.\par SPINE: Cervical spine appears normal and moves freely; thoracic spine appears normal and moves freely; lumbosacral spine appears normal and moves freely, normal, nontender.\par MUSCULOSKELETAL: Hands, wrists, and elbows are normal and move freely, shoulders are normal and move freely.  [de-identified] : Bilateral knee exam shows healed incision with no sign of infection. ROM 0-110 degrees. No effusion

## 2022-03-30 NOTE — END OF VISIT
[FreeTextEntry3] : I, Daryl Merlos, acted solely as a scribe for Dr. Nicholas Davidson on this date 03/30/2022.

## 2022-03-30 NOTE — DISCUSSION/SUMMARY
[de-identified] : 81 year old female s/p left TKA 9/28/2021 and right TKA in 2015. We reviewed her previous xrays which showed that the implants remain stable. She is feeling some hyperextension in the knees and we explained that it is most likely related to muscle weakness. We offered to provide a rx for PT, but she deferred and will exercise at home. She also has right knee pes bursitis. Her symptoms with the bursitis are well controlled so she deferred a cortisone injection. She will follow up in December for radiographic surveillance of both knees.

## 2022-03-30 NOTE — REASON FOR VISIT
[Follow-Up Visit] : a follow-up visit for [Other: ____] : [unfilled] [FreeTextEntry2] : S/p right total knee replacement DOS: April 7, 2015.  S/P Left total knee arthroplasty. Computer-assisted tibial resection, OrthAlign procedure. DOS: 9/28/21. \par \par \par

## 2022-03-30 NOTE — HISTORY OF PRESENT ILLNESS
[Pain Location] : pain [3] : a current pain level of 3/10 [1] : a minimum pain level of 1/10 [5] : a maximum pain level of 5/10 [Rest] : relieved by rest [de-identified] : 82 y/o F presents for follow up of the bilateral knees. She is s/p right TKA 4/7/2015 and s/p left TKA 9/28/2021. She feels like the knees hyperextend. She is ambulating without any assistive devices. She was doing PT, but she had to stop it because of the copay. She has pain with stairs. She was receiving right knee pes bursa cortisone injections which provided relief.  [de-identified] : stairs

## 2022-04-08 ENCOUNTER — APPOINTMENT (OUTPATIENT)
Dept: FAMILY MEDICINE | Facility: CLINIC | Age: 82
End: 2022-04-08
Payer: MEDICARE

## 2022-04-08 VITALS
OXYGEN SATURATION: 94 % | DIASTOLIC BLOOD PRESSURE: 82 MMHG | HEIGHT: 65 IN | HEART RATE: 72 BPM | BODY MASS INDEX: 45.65 KG/M2 | SYSTOLIC BLOOD PRESSURE: 136 MMHG | WEIGHT: 274 LBS

## 2022-04-08 PROCEDURE — G0444 DEPRESSION SCREEN ANNUAL: CPT | Mod: 59

## 2022-04-08 PROCEDURE — 99214 OFFICE O/P EST MOD 30 MIN: CPT | Mod: 25

## 2022-04-09 NOTE — REVIEW OF SYSTEMS
[Negative] : Gastrointestinal [Suicidal] : not suicidal [Anxiety] : no anxiety [Depression] : no depression

## 2022-04-09 NOTE — HISTORY OF PRESENT ILLNESS
[FreeTextEntry1] : prescriptions [de-identified] : MS. MERIDA IS A PLEASANT 82 YO PRESENTING FOR FOLLOW-UP.  IS SEEING ORTHOPEDICS DR. ALY IN FOLLOW-UP OF KNEES.  PAIN HAS NOT BEEN "THAT BAD" AND WANTED TO WAIT ON INJECTION KNEE.  HAS BEEN WATCHING DIET.  SOME CHEATING.  PORTIONS HAVE IMPROVED.  WOULD LIKE TO LOOSE WEIGHT.  DID NOT GO FOR RIGHT MAMMOGRAM YET.  HISTORY OF ANXIETY.  HAS HAD NO PANIC ATTACKS.  NOT ANXIOUS.  NOT DEPRESSED OR DOWN.  IS ON LEXAPRO FOR DEPRESSION.  ALSO HISTORY OF ELEVATED CHOLESTEROL ON LIPITOR, HYPOTHYROIDISM ON LEVOTHYROXINE AND BLOOD PRESSURE CONTROLLED ON METOPROLOL.  FEELING WELL ON CURRENT DOSAGE OF LEVOTHYROXINE.  NOT TIRED.

## 2022-04-09 NOTE — PLAN
[FreeTextEntry1] : BREAST IMAGING REVIEWED AND IMAGING RE-ADVISED\par RECENT LAB WORK REVIEWED\par CONTINUE LEVOTHYROXINE AT CURRENT DOSAGE\par MONITOR BLOOD PRESSURE\par HEALTHY DIET AND LIFESTYLE MODIFICATIONS\par HEALTHY WEIGHT LOSS \par STRESS REDUCTION EXERCISES\par FOLLOW-UP ALL SPECIALISTS AS DIRECTED \par ENDOCRINOLOGY CONSULTANT NOTE REVIEWED\par CALL WITH ANY QUESTIONS, CONCERNS OR CHANGES

## 2022-04-09 NOTE — HEALTH RISK ASSESSMENT
[0] : 2) Feeling down, depressed, or hopeless: Not at all (0) [PHQ-2 Negative - No further assessment needed] : PHQ-2 Negative - No further assessment needed [[id number]] : 0

## 2022-04-09 NOTE — PHYSICAL EXAM
[No Acute Distress] : no acute distress [Well Nourished] : well nourished [Well-Appearing] : well-appearing [Normal Sclera/Conjunctiva] : normal sclera/conjunctiva [PERRL] : pupils equal round and reactive to light [No Respiratory Distress] : no respiratory distress  [No Accessory Muscle Use] : no accessory muscle use [Clear to Auscultation] : lungs were clear to auscultation bilaterally [Normal Rate] : normal rate  [Regular Rhythm] : with a regular rhythm [Normal S1, S2] : normal S1 and S2 [Soft] : abdomen soft [Non Tender] : non-tender [Normal Bowel Sounds] : normal bowel sounds [Speech Grossly Normal] : speech grossly normal [Memory Grossly Normal] : memory grossly normal [Normal Mood] : the mood was normal

## 2022-05-25 ENCOUNTER — APPOINTMENT (OUTPATIENT)
Dept: ORTHOPEDIC SURGERY | Facility: CLINIC | Age: 82
End: 2022-05-25
Payer: MEDICARE

## 2022-05-25 VITALS
BODY MASS INDEX: 45.65 KG/M2 | HEIGHT: 65 IN | DIASTOLIC BLOOD PRESSURE: 79 MMHG | SYSTOLIC BLOOD PRESSURE: 146 MMHG | WEIGHT: 274 LBS | HEART RATE: 70 BPM

## 2022-05-25 PROCEDURE — 73562 X-RAY EXAM OF KNEE 3: CPT | Mod: LT

## 2022-05-25 PROCEDURE — 20610 DRAIN/INJ JOINT/BURSA W/O US: CPT | Mod: 50

## 2022-05-25 PROCEDURE — 99214 OFFICE O/P EST MOD 30 MIN: CPT | Mod: 25

## 2022-05-25 NOTE — END OF VISIT
[FreeTextEntry3] : I, Daryl Merlos, acted solely as a scribe for Dr. Nicholas Davidson on this date 05/25/2022.

## 2022-05-25 NOTE — REASON FOR VISIT
[Follow-Up Visit] : a follow-up visit for [Other: ____] : [unfilled] [FreeTextEntry2] : S/P Left total knee arthroplasty. Computer-assisted tibial resection, OrthAlign procedure. DOS: 9/28/21.

## 2022-05-25 NOTE — PROCEDURE
[de-identified] : I injected the patient's left knee today with cortisone for pes anserine bursitis.\par \par I discussed at length with the patient the planned steroid and lidocaine injection. The risks, benefits, convalescence and alternatives were reviewed. The possible side effects discussed included but were not limited to: pain, swelling, heat, bleeding, and redness. Symptoms are generally mild but if they are extensive then contact the office. Giving pain relievers by mouth such as NSAIDs or Tylenol can generally treat the reactions to steroid and lidocaine. Rare cases of infection have been noted. Rash, hives and itching may occur post injection. If you have muscle pain or cramps, flushing and or swelling of the face, rapid heart beat, nausea, dizziness, fever, chills, headache, difficulty breathing, swelling in the arms or legs, or have a prickly feeling of your skin, contact a health care provider immediately. Following this discussion, the knee was prepped with Alcohol and under sterile condition the 40 mg Depo-Medrol and 4 cc Lidocaine injection was performed with a 22 gauge needle through a superolateral injection site. The needle was introduced into the joint, aspiration was performed to ensure intra-articular placement and the medication was injected. Upon withdrawal of the needle the site was cleaned with alcohol and a band aid applied. The patient tolerated the injection well and there were no adverse effects. Post injection instructions included no strenuous activity for 24 hours, cryotherapy and if there are any adverse effects to contact the office. \par \par \par I injected the patient's right knee today with cortisone for pes anserine bursitis.\par \par I discussed at length with the patient the planned steroid and lidocaine injection. The risks, benefits, convalescence and alternatives were reviewed. The possible side effects discussed included but were not limited to: pain, swelling, heat, bleeding, and redness. Symptoms are generally mild but if they are extensive then contact the office. Giving pain relievers by mouth such as NSAIDs or Tylenol can generally treat the reactions to steroid and lidocaine. Rare cases of infection have been noted. Rash, hives and itching may occur post injection. If you have muscle pain or cramps, flushing and or swelling of the face, rapid heart beat, nausea, dizziness, fever, chills, headache, difficulty breathing, swelling in the arms or legs, or have a prickly feeling of your skin, contact a health care provider immediately. Following this discussion, the knee was prepped with Alcohol and under sterile condition the 40 mg Depo-Medrol and 4 cc Lidocaine injection was performed with a 22 gauge needle through a superolateral injection site. The needle was introduced into the joint, aspiration was performed to ensure intra-articular placement and the medication was injected. Upon withdrawal of the needle the site was cleaned with alcohol and a band aid applied. The patient tolerated the injection well and there were no adverse effects. Post injection instructions included no strenuous activity for 24 hours, cryotherapy and if there are any adverse effects to contact the office.

## 2022-05-25 NOTE — DISCUSSION/SUMMARY
[Medication Risks Reviewed] : Medication risks reviewed [de-identified] : 80 y/o F s/p left TKA 9/28/21. The patient fell on Saturday and landed on the left knee. We reassured her that her implants are stable and there is no evidence of fractures. She is also s/p right TKA 4/7/2015. She continues to be symptomatic from bilateral knee pes anserine bursitis. She opted for bilateral pes bursa cortisone injections which she tolerated well. She may repeat them in three months if needed.

## 2022-05-25 NOTE — HISTORY OF PRESENT ILLNESS
[Pain Location] : pain [Bending] : worsened by bending [Rest] : relieved by rest [4] : a current pain level of 4/10 [1] : a minimum pain level of 1/10 [6] : a maximum pain level of 6/10 [de-identified] : Patient is a 81 year old female who is s/p left TKA on 9/28/21 who presents c/o left knee pain s/p falling onto flexed knee on concrete on Saturday.  She locates the pain to anterior knee. She has a superficial abrasion to anterior knee. She wanted to make sure the implant is stable. She is also s/p right TKA 4/7/2015. She feels like the knees hyperextend. She was receiving right knee pes bursa cortisone injections which provided relief.

## 2022-05-25 NOTE — PHYSICAL EXAM
[de-identified] : GENERAL APPEARANCE: Well nourished and hydrated, pleasant, alert, and oriented x 3. Appears their stated age. \par HEENT: Normocephalic, extraocular eye motion intact. Nasal septum midline. Oral cavity clear. External auditory canal clear. \par RESPIRATORY: Breath sounds clear and audible in all lobes. No wheezing, No accessory muscle use.\par CARDIOVASCULAR: No apparent abnormalities. No lower leg edema. No varicosities. Pedal pulses are palpable.\par NEUROLOGIC: Sensation is normal, no muscle weakness in the upper or lower extremities.\par DERMATOLOGIC: No apparent skin lesions, moist, warm, no rash.\par SPINE: Cervical spine appears normal and moves freely; thoracic spine appears normal and moves freely; lumbosacral spine appears normal and moves freely, normal, nontender.\par MUSCULOSKELETAL: Hands, wrists, and elbows are normal and move freely, shoulders are normal and move freely.  [de-identified] : Bilateral knee exam shows healed incision with no sign of infection. ROM 0-120 degrees. No effusion. Tenderness over pes bursa\par She has a superficial abrasion on the anterior left knee\par  [de-identified] : 3V xray of the left knee done in the office today and reviewed by Dr. Nicholas Davidson demonstrates s/p implants in good positioning with no evidence of wear, loosening, or subsidence. There are no fractures.

## 2022-05-27 ENCOUNTER — NON-APPOINTMENT (OUTPATIENT)
Age: 82
End: 2022-05-27

## 2022-05-27 ENCOUNTER — APPOINTMENT (OUTPATIENT)
Dept: CARDIOLOGY | Facility: CLINIC | Age: 82
End: 2022-05-27
Payer: MEDICARE

## 2022-05-27 VITALS
TEMPERATURE: 98.1 F | SYSTOLIC BLOOD PRESSURE: 131 MMHG | HEART RATE: 67 BPM | DIASTOLIC BLOOD PRESSURE: 74 MMHG | OXYGEN SATURATION: 91 % | WEIGHT: 269 LBS | BODY MASS INDEX: 44.82 KG/M2 | HEIGHT: 65 IN

## 2022-05-27 PROCEDURE — 93000 ELECTROCARDIOGRAM COMPLETE: CPT

## 2022-05-27 PROCEDURE — 99214 OFFICE O/P EST MOD 30 MIN: CPT

## 2022-06-09 NOTE — ASU PREOP CHECKLIST - DNR CLARIFICATION FORM COMPLETED
06/09/2022  Ghazal Santiago is a 53 y.o., female.    Pre-op Assessment    I have reviewed the Patient Summary Reports.    I have reviewed the Nursing Notes. I have reviewed the NPO Status.   I have reviewed the Medications.     Review of Systems  Anesthesia Hx:  Denies Family Hx of Anesthesia complications.   Denies Personal Hx of Anesthesia complications.   Social:  Smoker    Cardiovascular:  Cardiovascular Normal     Pulmonary:   Pneumonia    Hepatic/GI:   GERD, well controlled    Musculoskeletal:   Arthritis     Neurological:   Neuromuscular Disease, Headaches   Chronic Pain Syndrome   Endocrine:  Endocrine Normal        Physical Exam  General:  Well nourished      Airway/Jaw/Neck:  Airway Findings: Mouth Opening: Normal   Tongue: Normal   General Airway Assessment: Adult Mallampati: II  TM Distance: Normal, at least 6 cm   Jaw/Neck Findings:  Neck ROM: Extension Decreased, Mild       Dental:  Dental Findings: molar caps     Chest/Lungs:  Chest/Lungs Clear    Heart/Vascular:  Heart Findings: Normal            Anesthesia Plan  Type of Anesthesia, risks & benefits discussed:  Anesthesia Type:  Gen Supraglottic Airway    Patient's Preference:   Plan Factors:          Intra-op Monitoring Plan: Standard ASA Monitors  Intra-op Monitoring Plan Comments:   Post Op Pain Control Plan: multimodal analgesia and IV/PO Opioids PRN  Post Op Pain Control Plan Comments:     Induction:   IV  Beta Blocker:  Patient is not currently on a Beta-Blocker (No further documentation required).       Informed Consent: Informed consent signed with the Patient and all parties understand the risks and agree with anesthesia plan.  All questions answered.  Anesthesia consent signed with patient.  ASA Score: 2     Day of Surgery Review of History & Physical:    H&P Update referred to the surgeon/provider.          Ready For Surgery From  Anesthesia Perspective.           Physical Exam  General: Well nourished    Airway:  Mallampati: II   Mouth Opening: Normal  TM Distance: Normal, at least 6 cm  Tongue: Normal  Neck ROM: Extension Decreased, Mild    Dental:  molar caps          Anesthesia Plan  Type of Anesthesia, risks & benefits discussed:    Anesthesia Type: Gen Supraglottic Airway  Intra-op Monitoring Plan: Standard ASA Monitors  Post Op Pain Control Plan: multimodal analgesia and IV/PO Opioids PRN  Induction:  IV  Informed Consent: Informed consent signed with the Patient and all parties understand the risks and agree with anesthesia plan.  All questions answered.   ASA Score: 2  Day of Surgery Review of History & Physical: H&P Update referred to the surgeon/provider.    Ready For Surgery From Anesthesia Perspective.       .       n/a

## 2022-06-21 LAB
ALBUMIN SERPL ELPH-MCNC: 4.5 G/DL
ALP BLD-CCNC: 97 U/L
ALT SERPL-CCNC: 19 U/L
ANION GAP SERPL CALC-SCNC: 12 MMOL/L
AST SERPL-CCNC: 19 U/L
BASOPHILS # BLD AUTO: 0.14 K/UL
BASOPHILS NFR BLD AUTO: 1.5 %
BILIRUB SERPL-MCNC: 0.2 MG/DL
BUN SERPL-MCNC: 19 MG/DL
CALCIUM SERPL-MCNC: 10.4 MG/DL
CHLORIDE SERPL-SCNC: 105 MMOL/L
CHOLEST SERPL-MCNC: 160 MG/DL
CO2 SERPL-SCNC: 26 MMOL/L
CREAT SERPL-MCNC: 1.08 MG/DL
EGFR: 52 ML/MIN/1.73M2
EOSINOPHIL # BLD AUTO: 0.41 K/UL
EOSINOPHIL NFR BLD AUTO: 4.3 %
ESTIMATED AVERAGE GLUCOSE: 148 MG/DL
GLUCOSE SERPL-MCNC: 134 MG/DL
HBA1C MFR BLD HPLC: 6.8 %
HCT VFR BLD CALC: 42.8 %
HDLC SERPL-MCNC: 63 MG/DL
HGB BLD-MCNC: 12.8 G/DL
IMM GRANULOCYTES NFR BLD AUTO: 0.3 %
LDLC SERPL CALC-MCNC: 77 MG/DL
LDLC SERPL DIRECT ASSAY-MCNC: 81 MG/DL
LYMPHOCYTES # BLD AUTO: 2.99 K/UL
LYMPHOCYTES NFR BLD AUTO: 31.6 %
MAN DIFF?: NORMAL
MCHC RBC-ENTMCNC: 29.1 PG
MCHC RBC-ENTMCNC: 29.9 GM/DL
MCV RBC AUTO: 97.3 FL
MONOCYTES # BLD AUTO: 0.58 K/UL
MONOCYTES NFR BLD AUTO: 6.1 %
NEUTROPHILS # BLD AUTO: 5.3 K/UL
NEUTROPHILS NFR BLD AUTO: 56.2 %
NONHDLC SERPL-MCNC: 97 MG/DL
PLATELET # BLD AUTO: 338 K/UL
POTASSIUM SERPL-SCNC: 5.3 MMOL/L
PROT SERPL-MCNC: 7.4 G/DL
RBC # BLD: 4.4 M/UL
RBC # FLD: 15 %
SODIUM SERPL-SCNC: 143 MMOL/L
T3 SERPL-MCNC: 89 NG/DL
T4 FREE SERPL-MCNC: 1.4 NG/DL
TRIGL SERPL-MCNC: 101 MG/DL
TSH SERPL-ACNC: 2.55 UIU/ML
WBC # FLD AUTO: 9.45 K/UL

## 2022-06-24 ENCOUNTER — APPOINTMENT (OUTPATIENT)
Dept: ENDOCRINOLOGY | Facility: CLINIC | Age: 82
End: 2022-06-24
Payer: MEDICARE

## 2022-06-24 VITALS — OXYGEN SATURATION: 92 % | HEART RATE: 58 BPM | SYSTOLIC BLOOD PRESSURE: 130 MMHG | DIASTOLIC BLOOD PRESSURE: 70 MMHG

## 2022-06-24 VITALS — WEIGHT: 274 LBS | BODY MASS INDEX: 45.65 KG/M2 | HEIGHT: 65 IN

## 2022-06-24 PROCEDURE — 99215 OFFICE O/P EST HI 40 MIN: CPT

## 2022-06-24 PROCEDURE — G0108 DIAB MANAGE TRN  PER INDIV: CPT

## 2022-06-24 RX ORDER — LANCETS 28 GAUGE
EACH MISCELLANEOUS
Qty: 1 | Refills: 1 | Status: DISCONTINUED | COMMUNITY
Start: 2020-01-13 | End: 2022-06-24

## 2022-06-24 RX ORDER — BLOOD SUGAR DIAGNOSTIC
STRIP MISCELLANEOUS
Qty: 1 | Refills: 0 | Status: DISCONTINUED | COMMUNITY
Start: 2020-01-09 | End: 2022-06-24

## 2022-06-24 RX ORDER — BLOOD-GLUCOSE METER
KIT MISCELLANEOUS
Qty: 1 | Refills: 0 | Status: DISCONTINUED | COMMUNITY
Start: 2020-01-09 | End: 2022-06-24

## 2022-06-24 NOTE — ASSESSMENT
[Importance of Diet and Exercise] : importance of diet and exercise to improve glycemic control, achieve weight loss and improve cardiovascular health [Weight Loss] : weight loss [FreeTextEntry1] : Dm2  in patient with morbid obesity and HTN and HLD.   \par \par Dm2: A1c 6.8.  \par continue metformin 1000 mg BID \par stop glimepiride and start ozempic weekly to get weight loss benefit \par discussed side effects and administration \par diabetic nephropathy: will cont to monitor GFR  \par eye exam every 6 mos. \par referral to nephrology for monitoring and see if we can add ACEI / ARB but she declined \par \par NTMNG : declines compressive sx  \par s/p FNA LEFT  nodule 1.3 cm nondiagnostic nodule in [facility] 2018.\par Images revised and has rim calcifications that can lead to FNA being nondiagnostic results again. \par  repeat US iN Dec 2022\par \par hypoT : tfts normal.  continue LT4 50 mcg \par \par obesity: continue with PT for her knee \par \par HTN :  bp at target on meds. Continue current management.\par I advised low fat/low cholesterol diet, low salt diet, and weight loss\par \par HLD: lipids good. continue atorvastatin 20 mg qd \par low fat/low cholesterol diet and weight loss advised\par \par osteoporosis:  DXA scan with normal BMD / i suspect that's due to arthritic changes \par continue vitamin d  \par continue MVi daily \par \par

## 2022-06-27 NOTE — PHYSICAL EXAM
[Well Developed] : well developed [Well Nourished] : well nourished [No Acute Distress] : no acute distress [Normal Conjunctiva] : normal conjunctiva [Normal Venous Pressure] : normal venous pressure [No Carotid Bruit] : no carotid bruit [Normal S1, S2] : normal S1, S2 [No Murmur] : no murmur [No Rub] : no rub [Clear Lung Fields] : clear lung fields [Good Air Entry] : good air entry [No Respiratory Distress] : no respiratory distress  [Soft] : abdomen soft [Non Tender] : non-tender [Normal Bowel Sounds] : normal bowel sounds [Normal Gait] : normal gait [Venous varicosities] : venous varicosities [No Rash] : no rash [Moves all extremities] : moves all extremities [No Focal Deficits] : no focal deficits [Alert and Oriented] : alert and oriented [Normal memory] : normal memory [de-identified] : trace edema - much improved compared to the past

## 2022-06-27 NOTE — HISTORY OF PRESENT ILLNESS
[FreeTextEntry1] : 81 year old female with a history of DM, HTN, HLD, LBBB, JAVON with CPAP, obesity, and hypothyroidism. She presents for routine follow up. She is s/p left knee replacement 4 weeks ago. She tolerated well. She is doing PT. She still has exertional dyspnea which she states is unchanged. TTE done 9/2021 showed LVEF 49% and mild PH and AS. She uses her CPAP nightly. Denies chest pain, palpitations, near syncope or syncope. Has chronic LE edema L>R. LHC done 7/15/16 revealed normal coronaries. NST done  6/2019 showed no ischemia and LVEF 48%. Repeat echo in September 2021 with LVEF 49%.  She tolerated the L TKR and doing well.  Dyspnea stable.  \par

## 2022-06-27 NOTE — CARDIOLOGY SUMMARY
[de-identified] : 5/27/2022:  Sinus  Rhythm with first degree A-V block and frequent PAC s \par Left bundle branch block and left axis. \par  [de-identified] : 6/28/2019:  normal perfusion, LVEF 48% [de-identified] : 9/13/2021:  LVEF 49%, mild aortic stenosis

## 2022-07-12 ENCOUNTER — APPOINTMENT (OUTPATIENT)
Dept: FAMILY MEDICINE | Facility: CLINIC | Age: 82
End: 2022-07-12

## 2022-07-12 VITALS
HEIGHT: 65 IN | WEIGHT: 270 LBS | HEART RATE: 68 BPM | DIASTOLIC BLOOD PRESSURE: 82 MMHG | SYSTOLIC BLOOD PRESSURE: 146 MMHG | OXYGEN SATURATION: 94 % | BODY MASS INDEX: 44.98 KG/M2

## 2022-07-12 DIAGNOSIS — M54.50 LOW BACK PAIN, UNSPECIFIED: ICD-10-CM

## 2022-07-12 LAB — GLUCOSE BLDC GLUCOMTR-MCNC: 142

## 2022-07-12 PROCEDURE — 82962 GLUCOSE BLOOD TEST: CPT

## 2022-07-12 PROCEDURE — 99214 OFFICE O/P EST MOD 30 MIN: CPT | Mod: 25

## 2022-07-17 PROBLEM — M54.50 LOW BACK PAIN: Status: ACTIVE | Noted: 2022-07-12

## 2022-07-17 NOTE — PLAN
[FreeTextEntry1] : RE-ADVISED BREAST IMAGING\par AVOID HEAVY LIFTING OR ACTIVITIES CAUSING PAIN\par LIGHT STRETCHING\par WILL GET XRAY L-SPINE TO START\par CHECK BONE DENSITY\par POCT - GLUCOSE DONE\par HEALTHY DIET AND LIFESTYLE MODIFICATIONS\par HEALTHY WEIGHT LOSS \par PRIOR LAB WORK REVIEWED WILL MONITOR LIPIDS\par CONSULTANT NOTES AND PRIOR IMAGING REVIEWED\par FOLLOW-UP ALL SPECIALISTS AS DIRECTED \par CALL WITH ANY QUESTIONS, CONCERNS OR CHANGES

## 2022-07-17 NOTE — REVIEW OF SYSTEMS
[Back Pain] : back pain [Negative] : Neurological [Suicidal] : not suicidal [Anxiety] : no anxiety [Depression] : no depression

## 2022-07-17 NOTE — PHYSICAL EXAM
[No Acute Distress] : no acute distress [Well Nourished] : well nourished [Well-Appearing] : well-appearing [Normal Sclera/Conjunctiva] : normal sclera/conjunctiva [PERRL] : pupils equal round and reactive to light [No Respiratory Distress] : no respiratory distress  [No Accessory Muscle Use] : no accessory muscle use [Clear to Auscultation] : lungs were clear to auscultation bilaterally [Normal Rate] : normal rate  [Regular Rhythm] : with a regular rhythm [Normal S1, S2] : normal S1 and S2 [Soft] : abdomen soft [Non Tender] : non-tender [Normal Bowel Sounds] : normal bowel sounds [No CVA Tenderness] : no CVA  tenderness [No Spinal Tenderness] : no spinal tenderness [No Joint Swelling] : no joint swelling [Grossly Normal Strength/Tone] : grossly normal strength/tone [Coordination Grossly Intact] : coordination grossly intact [No Focal Deficits] : no focal deficits [Normal Gait] : normal gait [Deep Tendon Reflexes (DTR)] : deep tendon reflexes were 2+ and symmetric [Speech Grossly Normal] : speech grossly normal [Memory Grossly Normal] : memory grossly normal [Normal Mood] : the mood was normal

## 2022-07-17 NOTE — HISTORY OF PRESENT ILLNESS
[FreeTextEntry1] : CHECK-UP [de-identified] : MS. MERIDA IS A PLEASANT 80 YO PRESENTING FOR FOLLOW-UP.  HISTORY OF DIABETES.  IS NOW ON OZEMPIC.  SIDE EFFECTS FROM MEDICATION.  PLANS TO DISCUSS WITH HER ENDOCRINOLOGIST ABOUT STOPPING IT.  TODAY WITH COMPLAINT OF LOW BACK PAIN RADIATING DOWN BUTTOCKS AND LEGS.  HAS HAD TWO MONTHS.  NO FALLS OR TRAUMA.  NO NUMBNESS/TINGLING EXTREMITIES.  NO MEDICATIONS TAKEN FOR SYMPTOMS "NOT THAT BAD".  STARTED ON RIGHT.  NO INCONTINENCE.  ALSO HISTORY OF DEPRESSION ON LEXAPRO.  FEELING OKAY ON THIS DOSAGE, HYPOTHYROIDISM, HYPERLIPIDEMIA ON LIPITOR.

## 2022-08-26 ENCOUNTER — RX RENEWAL (OUTPATIENT)
Age: 82
End: 2022-08-26

## 2022-08-31 ENCOUNTER — APPOINTMENT (OUTPATIENT)
Dept: PULMONOLOGY | Facility: CLINIC | Age: 82
End: 2022-08-31

## 2022-08-31 VITALS
OXYGEN SATURATION: 93 % | WEIGHT: 270 LBS | DIASTOLIC BLOOD PRESSURE: 68 MMHG | RESPIRATION RATE: 16 BRPM | HEART RATE: 67 BPM | BODY MASS INDEX: 44.98 KG/M2 | HEIGHT: 65 IN | SYSTOLIC BLOOD PRESSURE: 142 MMHG

## 2022-08-31 PROCEDURE — 99214 OFFICE O/P EST MOD 30 MIN: CPT

## 2022-08-31 NOTE — REVIEW OF SYSTEMS
[SOB on Exertion] : sob on exertion [Edema] : edema [Back Pain] : back pain [Diabetes] : diabetes [Thyroid Problem] : no thyroid problem [Negative] : Psychiatric

## 2022-08-31 NOTE — DISCUSSION/SUMMARY
[FreeTextEntry1] : \par  #1. Continue CPAP at 14 cm of water for severe JAVON; gave pt Airtouch F20 in the past which she prefers over the [place] FFM\par  #2. Diet and exercise for weight loss\par  #3. Prior PFTs were essentially normal\par #4. The patient does not appear to require chronic BD therapy at this time \par #5. Replace equipment as needed; ordered new machine 8/31/22\par #6. Cardiology f/u\par #7. F/u in 4 months with compliance on new machine and with PFTs\par #8. Reviewed risks of exposure and symptoms of Covid-19 virus, including how the virus is spread and precautions to avoid doin virus. \par \par The patient expressed understanding and agreement with the above recommendations/plan and accepts responsibility to be compliant with recommended testing, therapies, and f/u visits.\par All relevant questions and concerns were addressed.

## 2022-08-31 NOTE — HISTORY OF PRESENT ILLNESS
[Excess Weight] : excess weight [Inability to Lose Weight] : inability to lose weight [Currently Experiencing] : The patient is currently experiencing symptoms. [Dyspnea] : dyspnea [Joint Pain] : joint pain [Low Calorie Diet] : low calorie diet [Fair Compliance] : fair compliance with treatment [Fair Tolerance] : fair tolerance of treatment [Poor Symptom Control] : poor symptom control [Sleep Apnea] : sleep apnea [Diabetes] : diabetes [Hypertension] : hypertension [High] : high [Low Calorie] : low calorie [Well Balanced Diet] : well balanced meals [Infrequently] : exercises infrequently [Aerobic Conditioning] : aerobic conditioning [Follow-Up - Routine Clinic] : a routine clinic follow-up of [CPAP] : CPAP [Good Compliance] : good compliance with treatment [Good Tolerance] : good tolerance of treatment [Good Symptom Control] : good symptom control [TextBox_4] : Patient c/o SOBOE but is otherwise without associated respiratory complaints.\par Pt was followed with Dr. White for her pulmonary issues but reports only SOBOE currently.\par She was last seen nearly 2 years ago. [FreeTextEntry1] : \par  [de-identified] : at 14 cm of water [On ___] : performed on [unfilled] [Patient] : the patient [To Assess ___] : to assess [unfilled] [None] : no new symptoms reported [FreeTextEntry9] : Chest CTA [FreeTextEntry8] : Essentially clear without PE

## 2022-08-31 NOTE — CONSULT LETTER
[Dear  ___] : Dear  [unfilled], [Consult Letter:] : I had the pleasure of evaluating your patient, [unfilled]. [Please see my note below.] : Please see my note below. [Consult Closing:] : Thank you very much for allowing me to participate in the care of this patient.  If you have any questions, please do not hesitate to contact me. [Sincerely,] : Sincerely, [Chip Pederson MD] : Chip Pederson MD [FreeTextEntry3] : Chip Pederson MD, FCCP, D. ABSM\par Pulmonary and Sleep Medicine\par [facility] [place]\par \par

## 2022-08-31 NOTE — PHYSICAL EXAM
[No Acute Distress] : no acute distress [Normal Appearance] : normal appearance [Supple] : supple [Normal Rate/Rhythm] : normal rate/rhythm [Normal S1, S2] : normal s1, s2 [No Murmurs] : no murmurs [No Resp Distress] : no resp distress [No Acc Muscle Use] : no acc muscle use [Normal Rhythm and Effort] : normal rhythm and effort [Clear to Auscultation Bilaterally] : clear to auscultation bilaterally [No Abnormalities] : no abnormalities [Benign] : benign [Not Tender] : not tender [Soft] : soft [No Clubbing] : no clubbing [1+ Pitting] : 1+ pitting [No Focal Deficits] : no focal deficits [Oriented x3] : oriented x3 [TextBox_2] : Pt is obese

## 2022-09-09 ENCOUNTER — APPOINTMENT (OUTPATIENT)
Dept: ORTHOPEDIC SURGERY | Facility: CLINIC | Age: 82
End: 2022-09-09

## 2022-09-09 VITALS
SYSTOLIC BLOOD PRESSURE: 137 MMHG | BODY MASS INDEX: 44.98 KG/M2 | DIASTOLIC BLOOD PRESSURE: 71 MMHG | HEART RATE: 61 BPM | HEIGHT: 65 IN | WEIGHT: 270 LBS

## 2022-09-09 PROCEDURE — 20610 DRAIN/INJ JOINT/BURSA W/O US: CPT

## 2022-09-09 PROCEDURE — 99213 OFFICE O/P EST LOW 20 MIN: CPT | Mod: 25

## 2022-09-09 NOTE — HISTORY OF PRESENT ILLNESS
[de-identified] : Patient is a 82 year old female who is s/p left TKA on 9/28/21 who presents bilateral knee pain e. She is also s/p right TKA 4/7/2015. She feels like the knees hyperextend. She was receiving right knee pes bursa cortisone injections which provided relief.  She has an upcoming wedding and she would like to repeat bilateral pes bursa injections today\par \par \par MAR MERIDA presents with pain. Pain levels include a current pain level of 4/10, a minimum pain level of 1/10 and a maximum pain level of 6/10. \par \par \par Modifying factors - worsened by bending. Relieving factors include relieved by rest. \par  [Daily] : ~He/She~ states the symptoms seem to be occuring daily [Bending] : worsened by bending [Direct Pressure] : worsened by direct pressure [Walking] : worsened by walking [Knee Flexion] : worsened with knee flexion [Ataxia] : no ataxia [Incontinence] : no incontinence [Loss of Dexterity] : good dexterity [Urinary Ret.] : no urinary retention [de-identified] : Injection

## 2022-09-09 NOTE — PROCEDURE
[de-identified] : The patient we received a left knee i in the pes bursa\par \par I discussed at length with the patient the planned steroid and lidocaine injection. The risks, benefits, convalescence and alternatives were reviewed. The possible side effects discussed included but were not limited to: pain, swelling, heat, bleeding, and redness. Symptoms are generally mild but if they are extensive then contact the office. Giving pain relievers by mouth such as NSAIDs or Tylenol can generally treat the reactions to steroid and lidocaine. Rare cases of infection have been noted. Rash, hives and itching may occur post injection. If you have muscle pain or cramps, flushing and or swelling of the face, rapid heart beat, nausea, dizziness, fever, chills, headache, difficulty breathing, swelling in the arms or legs, or have a prickly feeling of your skin, contact a health care provider immediately. Following this discussion, the knee was prepped with Alcohol and under sterile condition the 80 mg Depo-Medrol and 6 cc Lidocaine injection was performed with a 20 gauge needle through a superolateral injection site. The needle was introduced into the joint, aspiration was performed to ensure intra-articular placement and the medication was injected. Upon withdrawal of the needle the site was cleaned with alcohol and a band aid applied. The patient tolerated the injection well and there were no adverse effects. Post injection instructions included no strenuous activity for 24 hours, cryotherapy and if there are any adverse effects to contact the office.\par .\par \par I injected her right knee along the anterior medial tibia in the pes bursa.\par \par I discussed at length with the patient the planned steroid and lidocaine injection. The risks, benefits, convalescence and alternatives were reviewed. The possible side effects discussed included but were not limited to: pain, swelling, heat, bleeding, and redness. Symptoms are generally mild but if they are extensive then contact the office. Giving pain relievers by mouth such as NSAIDs or Tylenol can generally treat the reactions to steroid and lidocaine. Rare cases of infection have been noted. Rash, hives and itching may occur post injection. If you have muscle pain or cramps, flushing and or swelling of the face, rapid heart beat, nausea, dizziness, fever, chills, headache, difficulty breathing, swelling in the arms or legs, or have a prickly feeling of your skin, contact a health care provider immediately. Following this discussion, the knee was prepped with Alcohol and under sterile condition the 80 mg Depo-Medrol and 6 cc Lidocaine injection was performed with a 20 gauge needle through a superolateral injection site. The needle was introduced into the joint, aspiration was performed to ensure intra-articular placement and the medication was injected. Upon withdrawal of the needle the site was cleaned with alcohol and a band aid applied. The patient tolerated the injection well and there were no adverse effects. Post injection instructions included no strenuous activity for 24 hours, cryotherapy and if there are any adverse effects to contact the office.\par

## 2022-09-09 NOTE — DISCUSSION/SUMMARY
[de-identified] : Patient is an 82-year-old female status post bilateral total knee arthroplasty she has persistent anterior medial knee pain consistent with bilateral pes bursitis.  She received bilateral pes injections today which she tolerated well.  She will follow-up with us as needed.\par \par

## 2022-09-09 NOTE — PHYSICAL EXAM
[Antalgic] : not antalgic [Apractic] : not apractic [Ataxic] : not ataxic [Coxalgic] : not coxalgic [UE/LE] : Sensory: Intact in bilateral upper & lower extremities [ALL] : dorsalis pedis, posterior tibial, femoral, popliteal, and radial 2+ and symmetric bilaterally [de-identified] : GENERAL APPEARANCE: Well nourished and hydrated, pleasant, alert, and oriented x 3. Appears their stated age. \par HEENT: Normocephalic, extraocular eye motion intact. Nasal septum midline. Oral cavity clear. External auditory canal clear. \par RESPIRATORY: Breath sounds clear and audible in all lobes. No wheezing, No accessory muscle use.\par CARDIOVASCULAR: No apparent abnormalities. No lower leg edema. No varicosities. Pedal pulses are palpable.\par NEUROLOGIC: Sensation is normal, no muscle weakness in the upper or lower extremities.\par DERMATOLOGIC: No apparent skin lesions, moist, warm, no rash.\par SPINE: Cervical spine appears normal and moves freely; thoracic spine appears normal and moves freely; lumbosacral spine appears normal and moves freely, normal, nontender.\par MUSCULOSKELETAL: Hands, wrists, and elbows are normal and move freely, shoulders are normal and move freely. \par Musculoskeletal: Gait: normal and not antalgic . Examination of bilateral  knee demonstrates healed midline incision, no obvious signs of infection, no joint effusion, no erythema, smooth and active full range of motion without pain 0- 120 degrees, tenderness over the pes anserine bursa. \par \par  \par 5/5 motor strength in bilateral lower extremities. Sensory: Intact in bilateral lower extremities. DTRs: Biceps, brachioradialis, triceps, patellar, ankle and plantar 2+ and symmetric bilaterally. Pulses: dorsalis pedis, posterior tibial, femoral, popliteal, and radial 2+ and symmetric bilaterally. \par Constitutional: Alert and in no acute distress and obese, but well-appearing and not in acute distress. \par

## 2022-09-09 NOTE — REASON FOR VISIT
[Follow-Up Visit] : a follow-up visit for [Other: ____] : [unfilled] [FreeTextEntry2] : S/P Left total knee arthroplasty. Computer-assisted tibial resection, OrthAlign procedure. DOS: 9/28/21. \par \par

## 2022-09-12 RX ORDER — SEMAGLUTIDE 1.34 MG/ML
2 INJECTION, SOLUTION SUBCUTANEOUS
Qty: 1 | Refills: 2 | Status: DISCONTINUED | COMMUNITY
Start: 2022-06-24 | End: 2022-09-12

## 2022-10-04 ENCOUNTER — RX RENEWAL (OUTPATIENT)
Age: 82
End: 2022-10-04

## 2022-10-14 ENCOUNTER — APPOINTMENT (OUTPATIENT)
Dept: FAMILY MEDICINE | Facility: CLINIC | Age: 82
End: 2022-10-14

## 2022-10-14 VITALS
OXYGEN SATURATION: 96 % | BODY MASS INDEX: 45.65 KG/M2 | SYSTOLIC BLOOD PRESSURE: 148 MMHG | HEIGHT: 65 IN | HEART RATE: 73 BPM | DIASTOLIC BLOOD PRESSURE: 86 MMHG | WEIGHT: 274 LBS

## 2022-10-14 DIAGNOSIS — Z00.00 ENCOUNTER FOR GENERAL ADULT MEDICAL EXAMINATION W/OUT ABNORMAL FINDINGS: ICD-10-CM

## 2022-10-14 PROCEDURE — G0444 DEPRESSION SCREEN ANNUAL: CPT | Mod: 59

## 2022-10-14 PROCEDURE — 99214 OFFICE O/P EST MOD 30 MIN: CPT | Mod: 25

## 2022-10-14 PROCEDURE — G0439: CPT

## 2022-10-15 NOTE — PHYSICAL EXAM
[No Acute Distress] : no acute distress [Well Nourished] : well nourished [Well-Appearing] : well-appearing [Normal Sclera/Conjunctiva] : normal sclera/conjunctiva [PERRL] : pupils equal round and reactive to light [Normal Outer Ear/Nose] : the outer ears and nose were normal in appearance [Normal Oropharynx] : the oropharynx was normal [No Respiratory Distress] : no respiratory distress  [No Accessory Muscle Use] : no accessory muscle use [Clear to Auscultation] : lungs were clear to auscultation bilaterally [Normal Rate] : normal rate  [Regular Rhythm] : with a regular rhythm [Normal S1, S2] : normal S1 and S2 [Soft] : abdomen soft [Non Tender] : non-tender [Normal Bowel Sounds] : normal bowel sounds [No Joint Swelling] : no joint swelling [Grossly Normal Strength/Tone] : grossly normal strength/tone [Coordination Grossly Intact] : coordination grossly intact [No Focal Deficits] : no focal deficits [Normal Gait] : normal gait [Deep Tendon Reflexes (DTR)] : deep tendon reflexes were 2+ and symmetric [Speech Grossly Normal] : speech grossly normal [Memory Grossly Normal] : memory grossly normal [Normal Mood] : the mood was normal [de-identified] : SEE WELLNESS EXAM

## 2022-10-15 NOTE — HEALTH RISK ASSESSMENT
[Good] : ~his/her~  mood as  good [Former] : Former [10-14] : 10-14 [Yes] : Yes [2 - 4 times a month (2 pts)] : 2-4 times a month (2 points) [1 or 2 (0 pts)] : 1 or 2 (0 points) [Never (0 pts)] : Never (0 points) [No] : In the past 12 months have you used drugs other than those required for medical reasons? No [No falls in past year] : Patient reported no falls in the past year [0] : 2) Feeling down, depressed, or hopeless: Not at all (0) [PHQ-2 Negative - No further assessment needed] : PHQ-2 Negative - No further assessment needed [Patient declined PAP Smear] : Patient declined PAP Smear [Patient declined colonoscopy] : Patient declined colonoscopy [HIV test declined] : HIV test declined [Hepatitis C test declined] : Hepatitis C test declined [None] : None [With Significant Other] : lives with significant other [Retired] : retired [High School] : high school [] :  [# Of Children ___] : has [unfilled] children [Feels Safe at Home] : Feels safe at home [Fully functional (bathing, dressing, toileting, transferring, walking, feeding)] : Fully functional (bathing, dressing, toileting, transferring, walking, feeding) [Fully functional (using the telephone, shopping, preparing meals, housekeeping, doing laundry, using] : Fully functional and needs no help or supervision to perform IADLs (using the telephone, shopping, preparing meals, housekeeping, doing laundry, using transportation, managing medications and managing finances) [Reports changes in hearing] : Reports changes in hearing [Reports normal functional visual acuity (ie: able to read med bottle)] : Reports normal functional visual acuity [Smoke Detector] : smoke detector [Carbon Monoxide Detector] : carbon monoxide detector [Safety elements used in home] : safety elements used in home [Seat Belt] :  uses seat belt [Sunscreen] : uses sunscreen [With Patient/Caregiver] : , with patient/caregiver [Designated Healthcare Proxy] : Designated healthcare proxy [Name: ___] : Health Care Proxy's Name: [unfilled]  [Relationship: ___] : Relationship: [unfilled] [YearQuit] : 1995 [Audit-CScore] : 2 [de-identified] : STAIRS AND WALKING [de-identified] : EATING HEALTHY [[id number]] : 0 [Change in mental status noted] : No change in mental status noted [Language] : denies difficulty with language [Learning/Retaining New Information] : denies difficulty learning/retaining new information [Handling Complex Tasks] : denies difficulty handling complex tasks [Reports changes in vision] : Reports no changes in vision [Reports changes in dental health] : Reports no changes in dental health [MammogramDate] : 09/21 [BoneDensityDate] : 05/20 [de-identified] : AUDIOLOGY EVALUATION ONE MONTH AGO.  DISCUSSED NEW HEARING AIDES [de-identified] : GLASSES FOR DISTANCE AND READING [de-identified] : DENTURES [AdvancecareDate] : 10/22

## 2022-10-15 NOTE — HISTORY OF PRESENT ILLNESS
[FreeTextEntry1] : F/U CHOLESTEROL [de-identified] : MS. MERIDA IS A PLEASANT 81 YO PRESENTING FOR FOLLOW-UP OF CHRONIC CARE.  NOTES THAT SHE HAD COVID ON OCTOBER 4TH BUT IS BETTER NOW.  JUST TIRED.  RUNNY NOSE IMPROVING.  COUGH IMPROVED.  HAD INJECTIONS IN KNEES WHICH HAS HELPED.  SAW PULMONOLOGY.  ON CPAP.  ALSO SAW OPHTHALMOLOGY.  CHRONIC HISTORY OF HYPERLIPIDEMIA, HYPOTHYROIDISM, DEPRESSION, DIABETES AND OBESITY.  TAKING ALL MEDICATIONS AS DIRECTED.  FEELING WELL ON MEDICATIONS. IS ON LIPITOR FOR CHOLESTEROL AND LEXAPRO FOR MOOD.

## 2022-10-15 NOTE — PLAN
[FreeTextEntry1] : CONSULTANT NOTES AND PRIOR LAB WORK REVIEWED\par WILL MONITOR LIPIDS\par HEALTHY DIET AND LIFESTYLE MODIFICATIONS\par HEALTHY WEIGHT LOSS \par CONTINUE CURRENT MEDICATIONS INCLUDING LIPITOR 20 MG QHS AND LEXAPRO 20 MG DAILY\par FOLLOW-UP ALL SPECIALISTS AS DIRECTED \par CALL WITH ANY QUESTIONS, CONCERNS OR CHANGES

## 2022-10-15 NOTE — HEALTH RISK ASSESSMENT
[Good] : ~his/her~  mood as  good [Former] : Former [10-14] : 10-14 [Yes] : Yes [2 - 4 times a month (2 pts)] : 2-4 times a month (2 points) [1 or 2 (0 pts)] : 1 or 2 (0 points) [Never (0 pts)] : Never (0 points) [No] : In the past 12 months have you used drugs other than those required for medical reasons? No [No falls in past year] : Patient reported no falls in the past year [0] : 2) Feeling down, depressed, or hopeless: Not at all (0) [PHQ-2 Negative - No further assessment needed] : PHQ-2 Negative - No further assessment needed [Patient declined PAP Smear] : Patient declined PAP Smear [Patient declined colonoscopy] : Patient declined colonoscopy [HIV test declined] : HIV test declined [Hepatitis C test declined] : Hepatitis C test declined [None] : None [With Significant Other] : lives with significant other [Retired] : retired [High School] : high school [] :  [# Of Children ___] : has [unfilled] children [Feels Safe at Home] : Feels safe at home [Fully functional (bathing, dressing, toileting, transferring, walking, feeding)] : Fully functional (bathing, dressing, toileting, transferring, walking, feeding) [Fully functional (using the telephone, shopping, preparing meals, housekeeping, doing laundry, using] : Fully functional and needs no help or supervision to perform IADLs (using the telephone, shopping, preparing meals, housekeeping, doing laundry, using transportation, managing medications and managing finances) [Reports changes in hearing] : Reports changes in hearing [Reports normal functional visual acuity (ie: able to read med bottle)] : Reports normal functional visual acuity [Smoke Detector] : smoke detector [Carbon Monoxide Detector] : carbon monoxide detector [Safety elements used in home] : safety elements used in home [Seat Belt] :  uses seat belt [Sunscreen] : uses sunscreen [With Patient/Caregiver] : , with patient/caregiver [Designated Healthcare Proxy] : Designated healthcare proxy [Name: ___] : Health Care Proxy's Name: [unfilled]  [Relationship: ___] : Relationship: [unfilled] [YearQuit] : 1995 [Audit-CScore] : 2 [de-identified] : STAIRS AND WALKING [de-identified] : EATING HEALTHY [[id number]] : 0 [Change in mental status noted] : No change in mental status noted [Language] : denies difficulty with language [Learning/Retaining New Information] : denies difficulty learning/retaining new information [Handling Complex Tasks] : denies difficulty handling complex tasks [Reports changes in vision] : Reports no changes in vision [Reports changes in dental health] : Reports no changes in dental health [MammogramDate] : 09/21 [BoneDensityDate] : 05/20 [de-identified] : AUDIOLOGY EVALUATION ONE MONTH AGO.  DISCUSSED NEW HEARING AIDES [de-identified] : GLASSES FOR DISTANCE AND READING [de-identified] : DENTURES [AdvancecareDate] : 10/22

## 2022-10-15 NOTE — HISTORY OF PRESENT ILLNESS
[FreeTextEntry1] : F/U CHOLESTEROL [de-identified] : MS. MERIDA IS A PLEASANT 83 YO PRESENTING FOR FOLLOW-UP OF CHRONIC CARE.  NOTES THAT SHE HAD COVID ON OCTOBER 4TH BUT IS BETTER NOW.  JUST TIRED.  RUNNY NOSE IMPROVING.  COUGH IMPROVED.  HAD INJECTIONS IN KNEES WHICH HAS HELPED.  SAW PULMONOLOGY.  ON CPAP.  ALSO SAW OPHTHALMOLOGY.  CHRONIC HISTORY OF HYPERLIPIDEMIA, HYPOTHYROIDISM, DEPRESSION, DIABETES AND OBESITY.  TAKING ALL MEDICATIONS AS DIRECTED.  FEELING WELL ON MEDICATIONS. IS ON LIPITOR FOR CHOLESTEROL AND LEXAPRO FOR MOOD.

## 2022-10-15 NOTE — PHYSICAL EXAM
[No Acute Distress] : no acute distress [Well Nourished] : well nourished [Well-Appearing] : well-appearing [Normal Sclera/Conjunctiva] : normal sclera/conjunctiva [PERRL] : pupils equal round and reactive to light [Normal Outer Ear/Nose] : the outer ears and nose were normal in appearance [Normal Oropharynx] : the oropharynx was normal [No Respiratory Distress] : no respiratory distress  [No Accessory Muscle Use] : no accessory muscle use [Clear to Auscultation] : lungs were clear to auscultation bilaterally [Normal Rate] : normal rate  [Regular Rhythm] : with a regular rhythm [Normal S1, S2] : normal S1 and S2 [Soft] : abdomen soft [Non Tender] : non-tender [Normal Bowel Sounds] : normal bowel sounds [No Joint Swelling] : no joint swelling [Grossly Normal Strength/Tone] : grossly normal strength/tone [Coordination Grossly Intact] : coordination grossly intact [No Focal Deficits] : no focal deficits [Normal Gait] : normal gait [Deep Tendon Reflexes (DTR)] : deep tendon reflexes were 2+ and symmetric [Speech Grossly Normal] : speech grossly normal [Memory Grossly Normal] : memory grossly normal [Normal Mood] : the mood was normal [de-identified] : SEE WELLNESS EXAM

## 2022-10-24 LAB
BASOPHILS # BLD AUTO: 0.11 K/UL
BASOPHILS NFR BLD AUTO: 0.9 %
EOSINOPHIL # BLD AUTO: 0.04 K/UL
EOSINOPHIL NFR BLD AUTO: 0.3 %
HCT VFR BLD CALC: 44.1 %
HGB BLD-MCNC: 13.8 G/DL
IMM GRANULOCYTES NFR BLD AUTO: 0.7 %
LYMPHOCYTES # BLD AUTO: 1.97 K/UL
LYMPHOCYTES NFR BLD AUTO: 15.8 %
MAN DIFF?: NORMAL
MCHC RBC-ENTMCNC: 30.1 PG
MCHC RBC-ENTMCNC: 31.3 GM/DL
MCV RBC AUTO: 96.3 FL
MONOCYTES # BLD AUTO: 0.48 K/UL
MONOCYTES NFR BLD AUTO: 3.9 %
NEUTROPHILS # BLD AUTO: 9.76 K/UL
NEUTROPHILS NFR BLD AUTO: 78.4 %
PLATELET # BLD AUTO: 363 K/UL
RBC # BLD: 4.58 M/UL
RBC # FLD: 14.6 %
WBC # FLD AUTO: 12.45 K/UL

## 2022-10-25 LAB
ALBUMIN SERPL ELPH-MCNC: 4.3 G/DL
ALP BLD-CCNC: 86 U/L
ALT SERPL-CCNC: 16 U/L
ANION GAP SERPL CALC-SCNC: 11 MMOL/L
AST SERPL-CCNC: 15 U/L
BILIRUB SERPL-MCNC: 0.2 MG/DL
BUN SERPL-MCNC: 22 MG/DL
CALCIUM SERPL-MCNC: 10 MG/DL
CHLORIDE SERPL-SCNC: 107 MMOL/L
CHOLEST SERPL-MCNC: 160 MG/DL
CO2 SERPL-SCNC: 26 MMOL/L
CREAT SERPL-MCNC: 1.09 MG/DL
EGFR: 51 ML/MIN/1.73M2
ESTIMATED AVERAGE GLUCOSE: 137 MG/DL
GLUCOSE SERPL-MCNC: 144 MG/DL
HBA1C MFR BLD HPLC: 6.4 %
HDLC SERPL-MCNC: 63 MG/DL
LDLC SERPL CALC-MCNC: 72 MG/DL
LDLC SERPL DIRECT ASSAY-MCNC: 80 MG/DL
NONHDLC SERPL-MCNC: 97 MG/DL
POTASSIUM SERPL-SCNC: 5.3 MMOL/L
PROT SERPL-MCNC: 7.2 G/DL
SODIUM SERPL-SCNC: 144 MMOL/L
TRIGL SERPL-MCNC: 125 MG/DL

## 2022-10-27 ENCOUNTER — APPOINTMENT (OUTPATIENT)
Dept: ENDOCRINOLOGY | Facility: CLINIC | Age: 82
End: 2022-10-27

## 2022-10-27 VITALS — WEIGHT: 275 LBS | HEIGHT: 65 IN | BODY MASS INDEX: 45.82 KG/M2

## 2022-10-27 VITALS — SYSTOLIC BLOOD PRESSURE: 126 MMHG | HEART RATE: 78 BPM | OXYGEN SATURATION: 93 % | DIASTOLIC BLOOD PRESSURE: 88 MMHG

## 2022-10-27 PROCEDURE — 99214 OFFICE O/P EST MOD 30 MIN: CPT

## 2022-10-27 NOTE — ASSESSMENT
[FreeTextEntry1] : Dm2  in patient with morbid obesity and HTN and HLD.   \par \par Dm2: A1c 6.4\par continue metformin 1000 mg BID and glimepiride \par could not tolerate glpw \par diabetic nephropathy: will cont to monitor GFR  \par eye exam every 6 mos. \par referral to nephrology for monitoring and see if we can add ACEI / ARB but she declined \par get flushot \par \par NTMNG : declines compressive sx  \par s/p FNA LEFT  nodule 1.3 cm nondiagnostic nodule in [facility] 2018.\par Images revised and has rim calcifications that can lead to FNA being nondiagnostic results again. \par  repeat US iN Dec 2022\par \par hypoT : tfts normal.  continue LT4 50 mcg \par \par obesity: no weight loss. Encouraged to walk outside 3x week  Limited by knee OA \par \par HTN :  bp at target on meds. Continue current management.\par I advised low fat/low cholesterol diet, low salt diet\par \par HLD: lipids at target.  continue atorvastatin 20 mg qd \par low fat/low cholesterol diet \par \par osteoporosis:  DXA scan with normal BMD / i suspect that's due to arthritic changes \par continue vitamin d  \par continue MVi daily \par \par

## 2022-10-27 NOTE — PHYSICAL EXAM
[Alert] : alert [Well Nourished] : well nourished [Obese] : obese [No Acute Distress] : no acute distress [Well Developed] : well developed [Normal Sclera/Conjunctiva] : normal sclera/conjunctiva [EOMI] : extra ocular movement intact [No Proptosis] : no proptosis [Normal Oropharynx] : the oropharynx was normal [Thyroid Not Enlarged] : the thyroid was not enlarged [No Thyroid Nodules] : no palpable thyroid nodules [No Respiratory Distress] : no respiratory distress [No Accessory Muscle Use] : no accessory muscle use [Clear to Auscultation] : lungs were clear to auscultation bilaterally [Normal S1, S2] : normal S1 and S2 [Normal Rate] : heart rate was normal [Regular Rhythm] : with a regular rhythm [No Edema] : no peripheral edema [Normal Bowel Sounds] : normal bowel sounds [Pedal Pulses Normal] : the pedal pulses are present [Not Tender] : non-tender [Not Distended] : not distended [Soft] : abdomen soft [Normal Anterior Cervical Nodes] : no anterior cervical lymphadenopathy [No Tremors] : no tremors [Oriented x3] : oriented to person, place, and time [Acanthosis Nigricans] : no acanthosis nigricans [Right foot was examined, including] : right foot ~C was examined, including visual inspection with sensory and pulse exams [Left foot was examined, including] : left foot ~C was examined, including visual inspection with sensory and pulse exams [Delayed in the Right Toes] : normal in the toes [Normal] : normal [Full ROM] : with full range of motion [Delayed in the Left Toes] : normal in the toes [Vibration Dec.] : normal vibratory sensation at the level of the toes [Position Sense Dec.] : normal position sense at the level of the toes

## 2022-12-19 LAB — SARS-COV-2 N GENE NPH QL NAA+PROBE: NOT DETECTED

## 2022-12-20 ENCOUNTER — APPOINTMENT (OUTPATIENT)
Dept: PULMONOLOGY | Facility: CLINIC | Age: 82
End: 2022-12-20

## 2022-12-20 VITALS
OXYGEN SATURATION: 96 % | RESPIRATION RATE: 16 BRPM | HEART RATE: 66 BPM | DIASTOLIC BLOOD PRESSURE: 80 MMHG | SYSTOLIC BLOOD PRESSURE: 134 MMHG

## 2022-12-20 VITALS — WEIGHT: 275 LBS | BODY MASS INDEX: 46.95 KG/M2 | HEIGHT: 64 IN

## 2022-12-20 PROCEDURE — 94729 DIFFUSING CAPACITY: CPT

## 2022-12-20 PROCEDURE — 94010 BREATHING CAPACITY TEST: CPT

## 2022-12-20 PROCEDURE — 94727 GAS DIL/WSHOT DETER LNG VOL: CPT

## 2022-12-20 PROCEDURE — 99215 OFFICE O/P EST HI 40 MIN: CPT | Mod: 25

## 2022-12-20 PROCEDURE — 85018 HEMOGLOBIN: CPT | Mod: QW

## 2022-12-20 RX ORDER — AZELASTINE HYDROCHLORIDE 137 UG/1
137 SPRAY, METERED NASAL
Qty: 30 | Refills: 0 | Status: COMPLETED | COMMUNITY
Start: 2022-10-22 | End: 2022-12-20

## 2022-12-20 RX ORDER — AMOXICILLIN 875 MG/1
875 TABLET, FILM COATED ORAL
Qty: 20 | Refills: 0 | Status: COMPLETED | COMMUNITY
Start: 2022-10-22 | End: 2022-12-20

## 2022-12-20 RX ORDER — METHYLPREDNISOLONE 4 MG/1
4 TABLET ORAL
Qty: 21 | Refills: 0 | Status: COMPLETED | COMMUNITY
Start: 2022-10-22 | End: 2022-12-20

## 2022-12-20 NOTE — PROCEDURE
[FreeTextEntry1] : PFTs 12/20/22 - Mild restriction on spirometry with normal lung volumes and moderately reduced diffusion capacity which corrected for alveolar volume.

## 2022-12-20 NOTE — CONSULT LETTER
[Dear  ___] : Dear  [unfilled], [Consult Letter:] : I had the pleasure of evaluating your patient, [unfilled]. [Please see my note below.] : Please see my note below. [Consult Closing:] : Thank you very much for allowing me to participate in the care of this patient.  If you have any questions, please do not hesitate to contact me. [Sincerely,] : Sincerely, [FreeTextEntry3] : Chip Pederson MD, FCCP, D. ABSM\par Pulmonary and Sleep Medicine\par [facility] [place]\par \par

## 2022-12-20 NOTE — HISTORY OF PRESENT ILLNESS
[Excess Weight] : excess weight [Inability to Lose Weight] : inability to lose weight [Currently Experiencing] : The patient is currently experiencing symptoms. [Dyspnea] : dyspnea [Joint Pain] : joint pain [Low Calorie Diet] : low calorie diet [Fair Compliance] : fair compliance with treatment [Fair Tolerance] : fair tolerance of treatment [Poor Symptom Control] : poor symptom control [Sleep Apnea] : sleep apnea [Diabetes] : diabetes [Hypertension] : hypertension [High] : high [Low Calorie] : low calorie [Well Balanced Diet] : well balanced meals [Infrequently] : exercises infrequently [Aerobic Conditioning] : aerobic conditioning [Follow-Up - Routine Clinic] : a routine clinic follow-up of [CPAP] : CPAP [Good Compliance] : good compliance with treatment [Good Tolerance] : good tolerance of treatment [Good Symptom Control] : good symptom control [On ___] : performed on [unfilled] [Patient] : the patient [To Assess ___] : to assess [unfilled] [None] : no new symptoms reported [TextBox_4] : Patient c/o SOBOE but is otherwise without associated respiratory complaints.\par Pt was followed with Dr. White for her pulmonary issues but reports only SOBOE currently.\par She was last seen nearly 2 years ago. [FreeTextEntry1] : \par  [de-identified] : at 14 cm of water [FreeTextEntry9] : Chest CTA [FreeTextEntry8] : Essentially clear without PE

## 2022-12-20 NOTE — DISCUSSION/SUMMARY
[FreeTextEntry1] : \par  #1. Continue CPAP at 14 cm of water for severe JAVON; gave pt A[employer]ouch F20 in the past which she prefers over the [place] FFM\par  #2. Diet and exercise for weight loss\par  #3. Prior PFTs were essentially normal but now with mild restriction but normal lung volumes, likely related to weight\par #4. The patient does not appear to require chronic BD therapy at this time \par #5. Replace equipment as needed; ordered new machine 8/31/22; doing well with new machine but no compliance is available\par #6. Cardiology f/u for SOBOE given no increase in HR with exertion\par #7. F/u in 3 months with compliance on new machine \par #8. Reviewed risks of exposure and symptoms of Covid-19 virus, including how the virus is spread and precautions to avoid doni virus. \par \par The patient expressed understanding and agreement with the above recommendations/plan and accepts responsibility to be compliant with recommended testing, therapies, and f/u visits.\par All relevant questions and concerns were addressed.

## 2022-12-21 ENCOUNTER — APPOINTMENT (OUTPATIENT)
Dept: ORTHOPEDIC SURGERY | Facility: CLINIC | Age: 82
End: 2022-12-21
Payer: MEDICARE

## 2022-12-21 VITALS
BODY MASS INDEX: 46.95 KG/M2 | WEIGHT: 275 LBS | SYSTOLIC BLOOD PRESSURE: 145 MMHG | HEART RATE: 72 BPM | DIASTOLIC BLOOD PRESSURE: 69 MMHG | HEIGHT: 64 IN

## 2022-12-21 DIAGNOSIS — M48.062 SPINAL STENOSIS, LUMBAR REGION WITH NEUROGENIC CLAUDICATION: ICD-10-CM

## 2022-12-21 DIAGNOSIS — M54.16 RADICULOPATHY, LUMBAR REGION: ICD-10-CM

## 2022-12-21 DIAGNOSIS — Z96.652 PRESENCE OF LEFT ARTIFICIAL KNEE JOINT: ICD-10-CM

## 2022-12-21 PROCEDURE — 99214 OFFICE O/P EST MOD 30 MIN: CPT | Mod: 25

## 2022-12-21 PROCEDURE — 20610 DRAIN/INJ JOINT/BURSA W/O US: CPT | Mod: 50

## 2022-12-21 PROCEDURE — 73522 X-RAY EXAM HIPS BI 3-4 VIEWS: CPT

## 2022-12-21 NOTE — HISTORY OF PRESENT ILLNESS
[6] : a current pain level of 6/10 [1] : a minimum pain level of 1/10 [9] : a maximum pain level of 9/10 [de-identified] : Patient is a 82 year old female who is s/p S/P Left total knee arthroplasty 9/28/21 and Right total knee replacement DOS: April 7, 2015 who presents c/o bilateral posterior hip pain radiating to toes. patient states pain started last week without injury or incident. patient states using tylenol with some relief.  She feels like the knees hyperextend. She has been receiving right knee pes bursa cortisone injections in the past which gave her relief. Last appointment, she had bilateral pes bursa injections which helped. \par

## 2022-12-21 NOTE — END OF VISIT
[FreeTextEntry3] : I, Israel Fierro, acted solely as a scribe for Dr. Nicholas Davidson on 12/21/2022

## 2022-12-21 NOTE — PHYSICAL EXAM
[UE/LE] : Sensory: Intact in bilateral upper & lower extremities [ALL] : dorsalis pedis, posterior tibial, femoral, popliteal, and radial 2+ and symmetric bilaterally [Antalgic] : not antalgic [Apractic] : not apractic [Ataxic] : not ataxic [Coxalgic] : not coxalgic [de-identified] : GENERAL APPEARANCE: Well nourished and hydrated, pleasant, alert, and oriented x 3. Appears their stated age. \par HEENT: Normocephalic, extraocular eye motion intact. Nasal septum midline. Oral cavity clear. External auditory canal clear. \par RESPIRATORY: Breath sounds clear and audible in all lobes. No wheezing, No accessory muscle use.\par CARDIOVASCULAR: No apparent abnormalities. No lower leg edema. No varicosities. Pedal pulses are palpable.\par NEUROLOGIC: Sensation is normal, no muscle weakness in the upper or lower extremities.\par DERMATOLOGIC: No apparent skin lesions, moist, warm, no rash.\par SPINE: Cervical spine appears normal and moves freely; thoracic spine appears normal and moves freely; lumbosacral spine appears normal and moves freely, normal, nontender.\par MUSCULOSKELETAL: Hands, wrists, and elbows are normal and move freely, shoulders are normal and move freely. \par Musculoskeletal: Gait: normal and not antalgic . Examination of bilateral  knee demonstrates healed midline incision, no obvious signs of infection, no joint effusion, no erythema, smooth and active full range of motion without pain 0- 120 degrees, tenderness over the pes anserine bursa. \par \par  \par 5/5 motor strength in bilateral lower extremities. Sensory: Intact in bilateral lower extremities. DTRs: Biceps, brachioradialis, triceps, patellar, ankle and plantar 2+ and symmetric bilaterally. Pulses: dorsalis pedis, posterior tibial, femoral, popliteal, and radial 2+ and symmetric bilaterally. \par Constitutional: Alert and in no acute distress and obese, but well-appearing and not in acute distress. \par  [de-identified] : 5V xray of the b/l hip done in the office today and reviewed by Dr. Nicholas Davidson demonstrates mild hip osteoarthritis

## 2022-12-21 NOTE — REASON FOR VISIT
[Follow-Up Visit] : a follow-up visit for [Other: ____] : [unfilled] [FreeTextEntry2] : S/P Left total knee arthroplasty. Computer-assisted tibial resection, OrthAlign procedure. DOS: 9/28/21. \par Right total knee replacement DOS: April 7, 2015.

## 2022-12-21 NOTE — PROCEDURE
[de-identified] : The patient we received a left knee i in the pes bursa\par \par I discussed at length with the patient the planned steroid and lidocaine injection. The risks, benefits, convalescence and alternatives were reviewed. The possible side effects discussed included but were not limited to: pain, swelling, heat, bleeding, and redness. Symptoms are generally mild but if they are extensive then contact the office. Giving pain relievers by mouth such as NSAIDs or Tylenol can generally treat the reactions to steroid and lidocaine. Rare cases of infection have been noted. Rash, hives and itching may occur post injection. If you have muscle pain or cramps, flushing and or swelling of the face, rapid heart beat, nausea, dizziness, fever, chills, headache, difficulty breathing, swelling in the arms or legs, or have a prickly feeling of your skin, contact a health care provider immediately. Following this discussion, the knee was prepped with Alcohol and under sterile condition the 80 mg Depo-Medrol and 6 cc Lidocaine injection was performed with a 20 gauge needle through a superolateral injection site. The needle was introduced into the joint, aspiration was performed to ensure intra-articular placement and the medication was injected. Upon withdrawal of the needle the site was cleaned with alcohol and a band aid applied. The patient tolerated the injection well and there were no adverse effects. Post injection instructions included no strenuous activity for 24 hours, cryotherapy and if there are any adverse effects to contact the office.\par .\par \par I injected her right knee along the anterior medial tibia in the pes bursa.\par \par I discussed at length with the patient the planned steroid and lidocaine injection. The risks, benefits, convalescence and alternatives were reviewed. The possible side effects discussed included but were not limited to: pain, swelling, heat, bleeding, and redness. Symptoms are generally mild but if they are extensive then contact the office. Giving pain relievers by mouth such as NSAIDs or Tylenol can generally treat the reactions to steroid and lidocaine. Rare cases of infection have been noted. Rash, hives and itching may occur post injection. If you have muscle pain or cramps, flushing and or swelling of the face, rapid heart beat, nausea, dizziness, fever, chills, headache, difficulty breathing, swelling in the arms or legs, or have a prickly feeling of your skin, contact a health care provider immediately. Following this discussion, the knee was prepped with Alcohol and under sterile condition the 80 mg Depo-Medrol and 6 cc Lidocaine injection was performed with a 20 gauge needle through a superolateral injection site. The needle was introduced into the joint, aspiration was performed to ensure intra-articular placement and the medication was injected. Upon withdrawal of the needle the site was cleaned with alcohol and a band aid applied. The patient tolerated the injection well and there were no adverse effects. Post injection instructions included no strenuous activity for 24 hours, cryotherapy and if there are any adverse effects to contact the office.\par

## 2023-01-24 ENCOUNTER — APPOINTMENT (OUTPATIENT)
Dept: FAMILY MEDICINE | Facility: CLINIC | Age: 83
End: 2023-01-24
Payer: MEDICARE

## 2023-01-24 VITALS
SYSTOLIC BLOOD PRESSURE: 146 MMHG | DIASTOLIC BLOOD PRESSURE: 84 MMHG | BODY MASS INDEX: 46.26 KG/M2 | WEIGHT: 271 LBS | HEIGHT: 64 IN | OXYGEN SATURATION: 95 % | HEART RATE: 87 BPM

## 2023-01-24 VITALS — SYSTOLIC BLOOD PRESSURE: 136 MMHG | DIASTOLIC BLOOD PRESSURE: 80 MMHG

## 2023-01-24 DIAGNOSIS — Q61.02 CONGENITAL MULTIPLE RENAL CYSTS: ICD-10-CM

## 2023-01-24 PROCEDURE — 99214 OFFICE O/P EST MOD 30 MIN: CPT

## 2023-01-25 ENCOUNTER — RX RENEWAL (OUTPATIENT)
Age: 83
End: 2023-01-25

## 2023-01-30 ENCOUNTER — RESULT CHARGE (OUTPATIENT)
Age: 83
End: 2023-01-30

## 2023-01-30 ENCOUNTER — APPOINTMENT (OUTPATIENT)
Dept: ENDOCRINOLOGY | Facility: CLINIC | Age: 83
End: 2023-01-30
Payer: MEDICARE

## 2023-01-30 VITALS — BODY MASS INDEX: 46.1 KG/M2 | WEIGHT: 270 LBS | HEIGHT: 64 IN

## 2023-01-30 VITALS — SYSTOLIC BLOOD PRESSURE: 140 MMHG | HEART RATE: 81 BPM | DIASTOLIC BLOOD PRESSURE: 82 MMHG

## 2023-01-30 LAB
ALBUMIN SERPL ELPH-MCNC: 4.3 G/DL
ALP BLD-CCNC: 78 U/L
ALT SERPL-CCNC: 21 U/L
ANION GAP SERPL CALC-SCNC: 13 MMOL/L
AST SERPL-CCNC: 24 U/L
BASOPHILS # BLD AUTO: 0.09 K/UL
BASOPHILS NFR BLD AUTO: 1 %
BILIRUB SERPL-MCNC: 0.3 MG/DL
BUN SERPL-MCNC: 20 MG/DL
CALCIUM SERPL-MCNC: 10.2 MG/DL
CHLORIDE SERPL-SCNC: 102 MMOL/L
CHOLEST SERPL-MCNC: 131 MG/DL
CO2 SERPL-SCNC: 24 MMOL/L
CREAT SERPL-MCNC: 1.17 MG/DL
EGFR: 47 ML/MIN/1.73M2
EOSINOPHIL # BLD AUTO: 0.26 K/UL
EOSINOPHIL NFR BLD AUTO: 2.9 %
ESTIMATED AVERAGE GLUCOSE: 148 MG/DL
GLUCOSE BLDC GLUCOMTR-MCNC: 124
GLUCOSE SERPL-MCNC: 189 MG/DL
HBA1C MFR BLD HPLC: 6.8 %
HCT VFR BLD CALC: 39.3 %
HDLC SERPL-MCNC: 59 MG/DL
HGB BLD-MCNC: 12.8 G/DL
IMM GRANULOCYTES NFR BLD AUTO: 0.2 %
LDLC SERPL CALC-MCNC: 54 MG/DL
LYMPHOCYTES # BLD AUTO: 2.36 K/UL
LYMPHOCYTES NFR BLD AUTO: 26.1 %
MAN DIFF?: NORMAL
MCHC RBC-ENTMCNC: 29.9 PG
MCHC RBC-ENTMCNC: 32.6 GM/DL
MCV RBC AUTO: 91.8 FL
MONOCYTES # BLD AUTO: 0.52 K/UL
MONOCYTES NFR BLD AUTO: 5.8 %
NEUTROPHILS # BLD AUTO: 5.79 K/UL
NEUTROPHILS NFR BLD AUTO: 64 %
NONHDLC SERPL-MCNC: 73 MG/DL
PLATELET # BLD AUTO: 372 K/UL
POTASSIUM SERPL-SCNC: 4.3 MMOL/L
PROT SERPL-MCNC: 7.2 G/DL
RBC # BLD: 4.28 M/UL
RBC # FLD: 13.7 %
SODIUM SERPL-SCNC: 139 MMOL/L
T4 FREE SERPL-MCNC: 1.4 NG/DL
TRIGL SERPL-MCNC: 93 MG/DL
TSH SERPL-ACNC: 1.95 UIU/ML
WBC # FLD AUTO: 9.04 K/UL

## 2023-01-30 PROCEDURE — 99214 OFFICE O/P EST MOD 30 MIN: CPT

## 2023-01-30 NOTE — PHYSICAL EXAM
[Alert] : alert [Well Nourished] : well nourished [No Acute Distress] : no acute distress [Well Developed] : well developed [Normal Sclera/Conjunctiva] : normal sclera/conjunctiva [EOMI] : extra ocular movement intact [No Proptosis] : no proptosis [Thyroid Not Enlarged] : the thyroid was not enlarged [No Thyroid Nodules] : no palpable thyroid nodules [No Respiratory Distress] : no respiratory distress [No Accessory Muscle Use] : no accessory muscle use [Clear to Auscultation] : lungs were clear to auscultation bilaterally [Normal S1, S2] : normal S1 and S2 [Normal Rate] : heart rate was normal [Regular Rhythm] : with a regular rhythm [Normal Anterior Cervical Nodes] : no anterior cervical lymphadenopathy [Normal Posterior Cervical Nodes] : no posterior cervical lymphadenopathy [Acanthosis Nigricans] : no acanthosis nigricans [Oriented x3] : oriented to person, place, and time [Normal Affect] : the affect was normal [Normal Mood] : the mood was normal [de-identified] : B/L LE edema, L>R 2+

## 2023-01-30 NOTE — HISTORY OF PRESENT ILLNESS
[FreeTextEntry1] : INTERVAL HISTORY: Limited activity due to sciatica, getting epidural steroid injection next week\par \par Type: 2\par Severity: moderate\par Duration: chronic\par \par Associated symptoms-\par Last eye exam: 2022, no DR per patient\par Last foot exam: none, denies neuropathy\par Kidney disease: none\par Heart disease: none\par \par Modifying factors-\par Current meds for glycemic control:\par Metformin 1000 mg BID\par Glimepiride 2 mg daily at bedtime\par \par Past meds: unable to tolerate GLP1 agonist\par \par Diet: cut out sweets and snacks between meals. Now just eating 3 meals daily, started 3 weeks ago.\par B- English muffin with two slices of chest\par L- sandwich\par D- meat and vegetables\par Weight: trying to lose, lost a few pounds\par Exercise: none due to sciatica, getting epidural injection next week\par \par SMBG once every few days, late morning about 1 hour after breakfast. Reports BG is usually 130s. Denies hypoglycemia.\par Current B, English muffin and two slices for cheese for breakfast 3 hours ago\par ============================\par NTMNG s/p nondiagnostic FNA of LEFT 1.3 cm nodule 2018. \par Thyroid sonogram 2022: RMP 1.0 cm heterogenous nodule, stable.\par LLP 1.6 x 1.3 x 1.2 cm peripherally calcified nodule s/p nondiagnostic FNA 2018, slight larger\par Denies dysphagia, anterior neck pain, and voice changes.\par ============================\par Hypothyroidism\par Current regimen: LT4 50 mg daily, takes appropriately\par ============================\par Osteoporosis with normal BMD, likely due to arthritic changes.\par Last DEXA 2022\par Current regimen: no meds\par Calcium supplement: Woman's MVI\par Vitamin D supplement: 1000 IU daily\par Dietary calcium: two slices of cheese with breakfast; occasional yogurt \par Exercise: none due to sciatica pain\par Family history of OP/parental hip fracture: none

## 2023-01-30 NOTE — ASSESSMENT
[FreeTextEntry1] : 82 year old female with T2DM, hypothyroidism, abd NTMNG. Glycemic control is acceptable.\par \par 1. T2DM- A1c 6.8%, slight rise due to diet nonadherence during the holidays. Has made diet changes over the past 3 weeks.\par -Continue Metformin and Glimepiride.\par -Continue SMBG.\par -Continue to work on lifestyle modifications- diet, exercise, and weight loss- to improve glycemic control.\par -Decline nephrology referral in the past but reviewed effects of DM on kidneys today and patient seemed agreeable. Referral given.\par -Repeat A1c and RTO for follow-up with MD in 3 months.\par \par 2. NTMNG- asymptomatic s/p nondiagnostic FNA of left 1.3 cm nodule with rim calcification.\par -Repeat sonogram now.\par \par 3. Hypothyroidism- euthyroid on replacement T4.\par -Continue current dose of LT4.\par -Repeat TFTs in 3 months.\par \par 4. Hyperlipidemia- LDL at target.\par -Continue statin.\par \par 5. Hypertension- controlled.\par -Continue current regimen.\par \par 6. Osteoporosis- normal bone density on DEXA from October 2022, likely due to arthritic changes.\par -Continue vitamin D 1000 IU daily.\par -Continue Woman's daily MVI.\par -Discussed benefits of weight bearing exercises.\par -Repeat DEXA October 2024.

## 2023-01-30 NOTE — REVIEW OF SYSTEMS
[Fatigue] : fatigue [Back Pain] : back pain [Recent Weight Gain (___ Lbs)] : no recent weight gain [Recent Weight Loss (___ Lbs)] : no recent weight loss [Dysphagia] : no dysphagia [Neck Pain] : no neck pain [Dysphonia] : no dysphonia [Chest Pain] : no chest pain [Palpitations] : no palpitations [Shortness Of Breath] : no shortness of breath [Nausea] : no nausea [Constipation] : no constipation [Abdominal Pain] : no abdominal pain [Vomiting] : no vomiting [Diarrhea] : no diarrhea [Polyuria] : no polyuria [Tremors] : no tremors [Pain/Numbness of Digits] : no pain/numbness of digits [Depression] : no depression [Anxiety] : no anxiety

## 2023-02-05 PROBLEM — Q61.02 MULTIPLE RENAL CYSTS: Status: ACTIVE | Noted: 2023-01-24

## 2023-02-05 NOTE — PHYSICAL EXAM
[No Acute Distress] : no acute distress [Well Nourished] : well nourished [Normal Sclera/Conjunctiva] : normal sclera/conjunctiva [PERRL] : pupils equal round and reactive to light [No Respiratory Distress] : no respiratory distress  [No Accessory Muscle Use] : no accessory muscle use [Clear to Auscultation] : lungs were clear to auscultation bilaterally [Normal Rate] : normal rate  [Regular Rhythm] : with a regular rhythm [Normal S1, S2] : normal S1 and S2 [Speech Grossly Normal] : speech grossly normal [Memory Grossly Normal] : memory grossly normal [Normal Mood] : the mood was normal

## 2023-02-05 NOTE — PLAN
[FreeTextEntry1] : CONSULTANT NOTE, PRIOR LAB WORK AND IMAGING REVIEWED\par HEALTHY DIET AND LIFESTYLE MODIFICATIONS\par HEALTHY WEIGHT LOSS \par MONITOR BLOOD PRESSURE\par RX FOR RENAL US IN FOLLOW-UP OF CYSTS SUSPECTED ON MRI DONE WITH PAIN MANAGEMENT\par RX MAMMOGRAM\par CONTINUE LIPITOR 20 MG QHS\par FOLLOW-UP ALL SPECIALISTS AS DIRECTED \par CALL WITH ANY QUESTIONS, CONCERNS OR CHANGES

## 2023-02-05 NOTE — HISTORY OF PRESENT ILLNESS
[FreeTextEntry1] : CHECK-UP [de-identified] : MS. MERIDA IS A PLEASANT 81 YO PRESENTING FOR FOLLOW-U.  SAW PAIN MANAGEMENT AND TO HAVE EPIDURAL IN BACK.  [facility] - DR. VARMA.  MEDICATIONS ADJUSTED BY CARDIOLOGY.  TO FOLLOW-UP IN TWO WEEKS.  BLOOD PRESSURE HAS BEEN OKAY AT OTHER APPOINTMENTS.  PORTIONS IMPROVED.  LESS SNACKING.  SUCCESSFUL WEIGHT LOSS.  CHRONIC HISTORY OF HYPERTENSION, HYPERLIPIDEMIA, DIABETES, HYPOTHYROIDISM AND DEPRESSION.  TAKING MEDICATIONS AS DIRECTED.  HAS UPCOMING FOLLOW-UP WITH ENDOCRINOLOGIST.  IS ON LIPITOR FOR CHOLESTEROL.

## 2023-02-17 ENCOUNTER — APPOINTMENT (OUTPATIENT)
Dept: CARDIOLOGY | Facility: CLINIC | Age: 83
End: 2023-02-17
Payer: MEDICARE

## 2023-02-17 ENCOUNTER — NON-APPOINTMENT (OUTPATIENT)
Age: 83
End: 2023-02-17

## 2023-02-17 VITALS
HEART RATE: 84 BPM | BODY MASS INDEX: 44.9 KG/M2 | TEMPERATURE: 98.6 F | WEIGHT: 263 LBS | OXYGEN SATURATION: 95 % | HEIGHT: 64 IN | DIASTOLIC BLOOD PRESSURE: 78 MMHG | SYSTOLIC BLOOD PRESSURE: 128 MMHG

## 2023-02-17 PROCEDURE — 99215 OFFICE O/P EST HI 40 MIN: CPT

## 2023-02-17 PROCEDURE — 93000 ELECTROCARDIOGRAM COMPLETE: CPT

## 2023-02-17 PROCEDURE — 93306 TTE W/DOPPLER COMPLETE: CPT

## 2023-02-24 ENCOUNTER — RX RENEWAL (OUTPATIENT)
Age: 83
End: 2023-02-24

## 2023-03-17 ENCOUNTER — APPOINTMENT (OUTPATIENT)
Dept: PULMONOLOGY | Facility: CLINIC | Age: 83
End: 2023-03-17
Payer: MEDICARE

## 2023-03-17 VITALS
RESPIRATION RATE: 16 BRPM | OXYGEN SATURATION: 95 % | SYSTOLIC BLOOD PRESSURE: 130 MMHG | DIASTOLIC BLOOD PRESSURE: 74 MMHG | HEART RATE: 74 BPM | WEIGHT: 265 LBS | BODY MASS INDEX: 45.24 KG/M2 | HEIGHT: 64 IN

## 2023-03-17 PROCEDURE — 99214 OFFICE O/P EST MOD 30 MIN: CPT

## 2023-03-17 NOTE — HISTORY OF PRESENT ILLNESS
[Excess Weight] : excess weight [Inability to Lose Weight] : inability to lose weight [Currently Experiencing] : The patient is currently experiencing symptoms. [Dyspnea] : dyspnea [Joint Pain] : joint pain [Low Calorie Diet] : low calorie diet [Fair Compliance] : fair compliance with treatment [Fair Tolerance] : fair tolerance of treatment [Poor Symptom Control] : poor symptom control [Sleep Apnea] : sleep apnea [Diabetes] : diabetes [Hypertension] : hypertension [High] : high [Low Calorie] : low calorie [Well Balanced Diet] : well balanced meals [Infrequently] : exercises infrequently [Aerobic Conditioning] : aerobic conditioning [Follow-Up - Routine Clinic] : a routine clinic follow-up of [CPAP] : CPAP [Good Compliance] : good compliance with treatment [Good Tolerance] : good tolerance of treatment [Good Symptom Control] : good symptom control [On ___] : performed on [unfilled] [Patient] : the patient [To Assess ___] : to assess [unfilled] [None] : no new symptoms reported [TextBox_4] : Pt is a former smoker of 1 ppd x 25 years, quit 1998\par Lung function testing with restrictive pattern though normal lung volumes and moderately reduced diffusion capacity which corrected for alveolar volume. \par Patient c/o SOBOE but is otherwise without associated respiratory complaints.\par Pt was followed with Dr. White for her pulmonary issues but reports only SOBOE currently.\par  [FreeTextEntry1] : \par  [de-identified] : 10-16 cm of water  [FreeTextEntry9] : Chest CTA [FreeTextEntry8] : Essentially clear without PE

## 2023-03-17 NOTE — DISCUSSION/SUMMARY
[FreeTextEntry1] : \par  #1. Continue APAP 10-16 cm of water for severe JAVON; gave pt Airtouch F20 in the past but would like to try the AirFit F30i FFM\par #2. Diet and exercise for weight loss\par  #3. Prior PFTs were essentially normal but now with mild restriction but normal lung volumes, likely related to weight\par #4. The patient does not appear to require chronic BD therapy at this time \par #5. Replace equipment as needed; ordered 3/17/23 with AirFit F30i; doing well with new machine \par #6. Cardiology f/u for SOBOE given no increase in HR with exertion\par #7. F/u in 3 months with compliance on new machine \par #8. Reviewed risks of exposure and symptoms of Covid-19 virus, including how the virus is spread and precautions to avoid doni virus. \par \par The patient expressed understanding and agreement with the above recommendations/plan and accepts responsibility to be compliant with recommended testing, therapies, and f/u visits.\par All relevant questions and concerns were addressed.

## 2023-03-17 NOTE — REVIEW OF SYSTEMS
[SOB on Exertion] : sob on exertion [Edema] : edema [Back Pain] : back pain [Diabetes] : diabetes [Negative] : Psychiatric [Thyroid Problem] : no thyroid problem

## 2023-03-17 NOTE — CONSULT LETTER
[Dear  ___] : Dear  [unfilled], [Consult Letter:] : I had the pleasure of evaluating your patient, [unfilled]. [Please see my note below.] : Please see my note below. [Consult Closing:] : Thank you very much for allowing me to participate in the care of this patient.  If you have any questions, please do not hesitate to contact me. [Sincerely,] : Sincerely, [FreeTextEntry3] : Chip Pederson MD, FCCP, D. ABSM\par Pulmonary and Sleep Medicine\par [facility] [place]\par \par

## 2023-03-22 ENCOUNTER — APPOINTMENT (OUTPATIENT)
Dept: ORTHOPEDIC SURGERY | Facility: CLINIC | Age: 83
End: 2023-03-22
Payer: MEDICARE

## 2023-03-22 VITALS
SYSTOLIC BLOOD PRESSURE: 141 MMHG | HEIGHT: 64 IN | HEART RATE: 83 BPM | WEIGHT: 265 LBS | BODY MASS INDEX: 45.24 KG/M2 | DIASTOLIC BLOOD PRESSURE: 77 MMHG

## 2023-03-22 DIAGNOSIS — M70.51 OTHER BURSITIS OF KNEE, RIGHT KNEE: ICD-10-CM

## 2023-03-22 PROCEDURE — 99214 OFFICE O/P EST MOD 30 MIN: CPT | Mod: 25

## 2023-03-22 PROCEDURE — 20610 DRAIN/INJ JOINT/BURSA W/O US: CPT | Mod: 50

## 2023-03-22 PROCEDURE — 73562 X-RAY EXAM OF KNEE 3: CPT | Mod: 50

## 2023-03-22 NOTE — PROCEDURE
[de-identified] : The patient we received a left knee i in the pes bursa\par \par I discussed at length with the patient the planned steroid and lidocaine injection. The risks, benefits, convalescence and alternatives were reviewed. The possible side effects discussed included but were not limited to: pain, swelling, heat, bleeding, and redness. Symptoms are generally mild but if they are extensive then contact the office. Giving pain relievers by mouth such as NSAIDs or Tylenol can generally treat the reactions to steroid and lidocaine. Rare cases of infection have been noted. Rash, hives and itching may occur post injection. If you have muscle pain or cramps, flushing and or swelling of the face, rapid heart beat, nausea, dizziness, fever, chills, headache, difficulty breathing, swelling in the arms or legs, or have a prickly feeling of your skin, contact a health care provider immediately. Following this discussion, the knee was prepped with Alcohol and under sterile condition the 80 mg Depo-Medrol and 6 cc Lidocaine injection was performed with a 20 gauge needle through a superolateral injection site. The needle was introduced into the joint, aspiration was performed to ensure intra-articular placement and the medication was injected. Upon withdrawal of the needle the site was cleaned with alcohol and a band aid applied. The patient tolerated the injection well and there were no adverse effects. Post injection instructions included no strenuous activity for 24 hours, cryotherapy and if there are any adverse effects to contact the office.\par .\par \par I injected her right knee along the anterior medial tibia in the pes bursa.\par \par I discussed at length with the patient the planned steroid and lidocaine injection. The risks, benefits, convalescence and alternatives were reviewed. The possible side effects discussed included but were not limited to: pain, swelling, heat, bleeding, and redness. Symptoms are generally mild but if they are extensive then contact the office. Giving pain relievers by mouth such as NSAIDs or Tylenol can generally treat the reactions to steroid and lidocaine. Rare cases of infection have been noted. Rash, hives and itching may occur post injection. If you have muscle pain or cramps, flushing and or swelling of the face, rapid heart beat, nausea, dizziness, fever, chills, headache, difficulty breathing, swelling in the arms or legs, or have a prickly feeling of your skin, contact a health care provider immediately. Following this discussion, the knee was prepped with Alcohol and under sterile condition the 80 mg Depo-Medrol and 6 cc Lidocaine injection was performed with a 20 gauge needle through a superolateral injection site. The needle was introduced into the joint, aspiration was performed to ensure intra-articular placement and the medication was injected. Upon withdrawal of the needle the site was cleaned with alcohol and a band aid applied. The patient tolerated the injection well and there were no adverse effects. Post injection instructions included no strenuous activity for 24 hours, cryotherapy and if there are any adverse effects to contact the office.\par

## 2023-03-22 NOTE — DISCUSSION/SUMMARY
[de-identified] : 82-year-old female status post bilateral total knee arthroplasty. The pt states she has persistent anterior medial knee pain consistent with bilateral pes bursitis. We reassured the pt that her implants are stable with no evidence of loosening or wear. We recommend that she continues with bilateral knee pes bursa injections which has given her relief in the past. Today, she received repeat bilateral pes injections today which she tolerated well. She will f/u in 3 months for repeat injections if needed. \par

## 2023-03-22 NOTE — PHYSICAL EXAM
[UE/LE] : Sensory: Intact in bilateral upper & lower extremities [ALL] : dorsalis pedis, posterior tibial, femoral, popliteal, and radial 2+ and symmetric bilaterally [Antalgic] : not antalgic [Apractic] : not apractic [Ataxic] : not ataxic [Coxalgic] : not coxalgic [de-identified] : GENERAL APPEARANCE: Well nourished and hydrated, pleasant, alert, and oriented x 3. Appears their stated age. \par HEENT: Normocephalic, extraocular eye motion intact. Nasal septum midline. Oral cavity clear. External auditory canal clear. \par RESPIRATORY: Breath sounds clear and audible in all lobes. No wheezing, No accessory muscle use.\par CARDIOVASCULAR: No apparent abnormalities. No lower leg edema. No varicosities. Pedal pulses are palpable.\par NEUROLOGIC: Sensation is normal, no muscle weakness in the upper or lower extremities.\par DERMATOLOGIC: No apparent skin lesions, moist, warm, no rash.\par SPINE: Cervical spine appears normal and moves freely; thoracic spine appears normal and moves freely; lumbosacral spine appears normal and moves freely, normal, nontender.\par MUSCULOSKELETAL: Hands, wrists, and elbows are normal and move freely, shoulders are normal and move freely. \par Musculoskeletal: Gait: normal and not antalgic . Examination of bilateral  knee demonstrates healed midline incision, no obvious signs of infection, no joint effusion, no erythema, smooth and active full range of motion without pain 0- 120 degrees, tenderness over the pes anserine bursa. \par \par  \par 5/5 motor strength in bilateral lower extremities. Sensory: Intact in bilateral lower extremities. DTRs: Biceps, brachioradialis, triceps, patellar, ankle and plantar 2+ and symmetric bilaterally. Pulses: dorsalis pedis, posterior tibial, femoral, popliteal, and radial 2+ and symmetric bilaterally. \par Constitutional: Alert and in no acute distress and obese, but well-appearing and not in acute distress. \par  [de-identified] : 5V xray of the bilateral knees done in office today and reviewed by Dr. Nicholas Davidson demonstrates s/p bilateral knees implants in good positioning with no evidence of wear, loosening, or subsidence.

## 2023-03-22 NOTE — END OF VISIT
[FreeTextEntry3] : I, Israel Fierro, acted solely as a scribe for Dr. Nicholas Davidson on 03/22/2023

## 2023-03-22 NOTE — HISTORY OF PRESENT ILLNESS
[6] : a current pain level of 6/10 [1] : a minimum pain level of 1/10 [9] : a maximum pain level of 9/10 [de-identified] : 81 y/o F pt presents with bilateral knee pain. She is s/p S/P Left TKA on 9/28/21 and Right TKA on April 7, 2015. She states she had radiating pain on her knees. Last appointment she complaint of pain that went up to her hips. patient states using Tylenol with some relief. She feels like the knees hyperextend. She has been receiving right knee pes bursa cortisone injections in the past which gave her relief. \par

## 2023-03-23 ENCOUNTER — RX RENEWAL (OUTPATIENT)
Age: 83
End: 2023-03-23

## 2023-04-24 ENCOUNTER — LABORATORY RESULT (OUTPATIENT)
Age: 83
End: 2023-04-24

## 2023-04-24 ENCOUNTER — NON-APPOINTMENT (OUTPATIENT)
Age: 83
End: 2023-04-24

## 2023-04-25 ENCOUNTER — OUTPATIENT (OUTPATIENT)
Dept: OUTPATIENT SERVICES | Facility: HOSPITAL | Age: 83
LOS: 1 days | End: 2023-04-25
Payer: MEDICARE

## 2023-04-25 ENCOUNTER — APPOINTMENT (OUTPATIENT)
Dept: ULTRASOUND IMAGING | Facility: CLINIC | Age: 83
End: 2023-04-25
Payer: MEDICARE

## 2023-04-25 DIAGNOSIS — Z90.710 ACQUIRED ABSENCE OF BOTH CERVIX AND UTERUS: Chronic | ICD-10-CM

## 2023-04-25 DIAGNOSIS — Z98.89 OTHER SPECIFIED POSTPROCEDURAL STATES: Chronic | ICD-10-CM

## 2023-04-25 DIAGNOSIS — Z96.651 PRESENCE OF RIGHT ARTIFICIAL KNEE JOINT: Chronic | ICD-10-CM

## 2023-04-25 DIAGNOSIS — Z98.49 CATARACT EXTRACTION STATUS, UNSPECIFIED EYE: Chronic | ICD-10-CM

## 2023-04-25 DIAGNOSIS — E03.9 HYPOTHYROIDISM, UNSPECIFIED: ICD-10-CM

## 2023-04-25 DIAGNOSIS — E78.5 HYPERLIPIDEMIA, UNSPECIFIED: ICD-10-CM

## 2023-04-25 DIAGNOSIS — Z90.49 ACQUIRED ABSENCE OF OTHER SPECIFIED PARTS OF DIGESTIVE TRACT: Chronic | ICD-10-CM

## 2023-04-25 PROCEDURE — 76536 US EXAM OF HEAD AND NECK: CPT

## 2023-04-25 PROCEDURE — 76536 US EXAM OF HEAD AND NECK: CPT | Mod: 26

## 2023-04-26 ENCOUNTER — APPOINTMENT (OUTPATIENT)
Dept: FAMILY MEDICINE | Facility: CLINIC | Age: 83
End: 2023-04-26
Payer: MEDICARE

## 2023-04-26 VITALS
DIASTOLIC BLOOD PRESSURE: 72 MMHG | HEART RATE: 84 BPM | BODY MASS INDEX: 46.95 KG/M2 | HEIGHT: 64 IN | WEIGHT: 275 LBS | SYSTOLIC BLOOD PRESSURE: 136 MMHG

## 2023-04-26 DIAGNOSIS — F32.A DEPRESSION, UNSPECIFIED: ICD-10-CM

## 2023-04-26 PROCEDURE — 99214 OFFICE O/P EST MOD 30 MIN: CPT

## 2023-04-26 NOTE — PHYSICAL EXAM
[No Acute Distress] : no acute distress [Well Nourished] : well nourished [Well-Appearing] : well-appearing [Normal Sclera/Conjunctiva] : normal sclera/conjunctiva [PERRL] : pupils equal round and reactive to light [No Respiratory Distress] : no respiratory distress  [No Accessory Muscle Use] : no accessory muscle use [Normal Rate] : normal rate  [Regular Rhythm] : with a regular rhythm [Normal S1, S2] : normal S1 and S2 [Speech Grossly Normal] : speech grossly normal [Memory Grossly Normal] : memory grossly normal [Normal Mood] : the mood was normal

## 2023-04-26 NOTE — PLAN
home [FreeTextEntry1] : CONSULTANT NOTE REVIEWED\par ALSO REVIEWED LAB WORK WITH MS. MERIDA\par TFT'S WELL CONTROLLED.  CONTINUE LEVOTHYROXINE 50 MCG DAILY\par HEALTHY DIET AND LIFESTYLE MODIFICATIONS\par HEALTHY WEIGHT LOSS \par NEED CONSULTANT NOTES FROM PAIN MANAGEMENT\par THYROID US RESULTS PENDING\par FOLLOW-UP ALL SPECIALISTS AS DIRECTED \par UP TO DATE WITH MAMMOGRAM - REPORT REVIEWED\par CALL WITH ANY QUESTIONS, CONCERNS OR CHANGES

## 2023-04-26 NOTE — HISTORY OF PRESENT ILLNESS
[FreeTextEntry1] : CHECK-UP [de-identified] : MS. MERIDA IS A PLEASANT 81 YO PRESENTING FOR FOLLOW-UP.  CHRONIC HISTORY OF HYPERTENSION, HYPERLIPIDEMIA, DIABETES, HYPOTHYROIDISM AND DEPRESSION. TAKING MEDICATIONS AS DIRECTED.   IS ON LIPITOR FOR CHOLESTEROL.   GETTING INJECTIONS IN KNEES.  THEY USUALLY HELP FOR A COUPLE MONTHS.  HAD EPIDURAL IN BACK BUT DID NOT HELP WITH PAIN.  PAIN MANAGEMENT IS PLANNING FOR "SOMETHING ELSE" PENDING INSURANCE APPROVAL.  SAW PULMONOLOGY AND ON CPAP FOR SLEEP APNEA.  SEEING ENDOCRINOLOGY TOMORROW AND HAD THYROID US YESTERDAY.  COULD CUT DOWN ON CARBOHYDRATE INTAKE.  \par WAS BEING TREATED FOR SINUS INFECTION AND HAD LAB WORK DONE.

## 2023-04-27 ENCOUNTER — APPOINTMENT (OUTPATIENT)
Dept: ENDOCRINOLOGY | Facility: CLINIC | Age: 83
End: 2023-04-27
Payer: MEDICARE

## 2023-04-27 VITALS — SYSTOLIC BLOOD PRESSURE: 128 MMHG | HEART RATE: 82 BPM | DIASTOLIC BLOOD PRESSURE: 84 MMHG | OXYGEN SATURATION: 98 %

## 2023-04-27 VITALS — BODY MASS INDEX: 46.44 KG/M2 | WEIGHT: 272 LBS | HEIGHT: 64 IN

## 2023-04-27 PROCEDURE — 99215 OFFICE O/P EST HI 40 MIN: CPT

## 2023-04-27 NOTE — ASSESSMENT
[Exercise/Effect on Glucose] : exercise/effect on glucose [Self Monitoring of Blood Glucose] : self monitoring of blood glucose [Retinopathy Screening] : Patient was referred to ophthalmology for retinopathy screening [Weight Loss] : weight loss [FreeTextEntry1] : Dm2  in patient with morbid obesity and HTN and HLD.   \par \par Dm2: A1c 6.9 getting worse. Not a bad target for her age. \par continue metformin 1000 mg BID and glimepiride \par could not tolerate glpw \par diabetic nephropathy: will cont to monitor GFR  \par Continue to work on lifestyle modifications- diet, exercise, and weight loss- to improve glycemic control.\par she still di not go to see nephrology. \par Repeat A1c and RTO for follow-up with MD in 3 months.. \par \par NTMNG : declines compressive sx  \par s/p FNA LEFT  nodule 1.3 cm nondiagnostic nodule in [facility] 2018.\par Images revised and has rim calcifications that can lead to FNA being nondiagnostic results again. \par US now in April 2023 stable nodules. REpeat US in Amy 2024 \par \par hypoT : tfts normal.  continue LT4 50 mcg \par \par obesity: gained 10 lbs.  Encouraged to walk outside 3x week  Limited by knee OA and she does not want to. \par \par HTN :  bp at target on meds. Continue current management.\par \par HLD: lipids at target.  continue atorvastatin 20 mg qd \par \par osteoporosis:  DXA scan with normal BMD / i suspect that's due to arthritic changes \par continue vitamin d  and  MVi daily \par \par

## 2023-04-27 NOTE — PHYSICAL EXAM
[Alert] : alert [Well Nourished] : well nourished [Obese] : obese [No Acute Distress] : no acute distress [Well Developed] : well developed [Normal Sclera/Conjunctiva] : normal sclera/conjunctiva [EOMI] : extra ocular movement intact [No Proptosis] : no proptosis [Normal Oropharynx] : the oropharynx was normal [Thyroid Not Enlarged] : the thyroid was not enlarged [No Thyroid Nodules] : no palpable thyroid nodules [No Respiratory Distress] : no respiratory distress [No Accessory Muscle Use] : no accessory muscle use [Clear to Auscultation] : lungs were clear to auscultation bilaterally [Normal S1, S2] : normal S1 and S2 [Normal Rate] : heart rate was normal [Regular Rhythm] : with a regular rhythm [Pedal Pulses Normal] : the pedal pulses are present [Normal Bowel Sounds] : normal bowel sounds [Not Tender] : non-tender [Not Distended] : not distended [Soft] : abdomen soft [Normal Anterior Cervical Nodes] : no anterior cervical lymphadenopathy [No Tremors] : no tremors [Oriented x3] : oriented to person, place, and time [Acanthosis Nigricans] : no acanthosis nigricans

## 2023-05-23 ENCOUNTER — OFFICE (OUTPATIENT)
Dept: URBAN - METROPOLITAN AREA CLINIC 104 | Facility: CLINIC | Age: 83
Setting detail: OPHTHALMOLOGY
End: 2023-05-23
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
## 2023-05-23 PROCEDURE — NO SHOW FE NO SHOW FEE: Performed by: SPECIALIST

## 2023-06-14 ENCOUNTER — APPOINTMENT (OUTPATIENT)
Dept: INTERNAL MEDICINE | Facility: CLINIC | Age: 83
End: 2023-06-14
Payer: MEDICARE

## 2023-06-14 VITALS
WEIGHT: 272 LBS | DIASTOLIC BLOOD PRESSURE: 68 MMHG | HEART RATE: 63 BPM | SYSTOLIC BLOOD PRESSURE: 133 MMHG | HEIGHT: 64 IN | BODY MASS INDEX: 46.44 KG/M2

## 2023-06-14 DIAGNOSIS — I73.9 PERIPHERAL VASCULAR DISEASE, UNSPECIFIED: ICD-10-CM

## 2023-06-14 PROCEDURE — G0439: CPT

## 2023-06-14 RX ORDER — TORSEMIDE 100 MG/1
100 TABLET ORAL
Qty: 45 | Refills: 1 | Status: COMPLETED | COMMUNITY
Start: 2020-07-24 | End: 2023-06-14

## 2023-06-14 NOTE — HEALTH RISK ASSESSMENT
[Yes] : Yes [Monthly or less (1 pt)] : Monthly or less (1 point) [1 or 2 (0 pts)] : 1 or 2 (0 points) [Never (0 pts)] : Never (0 points) [No] : In the past 12 months have you used drugs other than those required for medical reasons? No [0] : 2) Feeling down, depressed, or hopeless: Not at all (0) [PHQ-2 Negative - No further assessment needed] : PHQ-2 Negative - No further assessment needed [I have developed a follow-up plan documented below in the note.] : I have developed a follow-up plan documented below in the note. [Audit-CScore] : 1 [[id number]] : 0 [HIV test declined] : HIV test declined [Hepatitis C test declined] : Hepatitis C test declined [Former] : Former [15-19] : 15-19 [> 15 Years] : > 15 Years

## 2023-06-14 NOTE — HISTORY OF PRESENT ILLNESS
[FreeTextEntry1] : physical exam  [de-identified] : Ms. MAR MERIDA is a 82 year female with a PMH of HTN, HLD, DM JAVON on CPAP comes to the office for physical exam. Patient denies fever, cough SOB. No other complaints at this time.

## 2023-06-14 NOTE — PHYSICAL EXAM
[No Acute Distress] : no acute distress [Well Nourished] : well nourished [Well Developed] : well developed [Well-Appearing] : well-appearing [Normal Sclera/Conjunctiva] : normal sclera/conjunctiva [PERRL] : pupils equal round and reactive to light [EOMI] : extraocular movements intact [Normal Outer Ear/Nose] : the outer ears and nose were normal in appearance [Normal Oropharynx] : the oropharynx was normal [No JVD] : no jugular venous distention [No Lymphadenopathy] : no lymphadenopathy [Supple] : supple [No Respiratory Distress] : no respiratory distress  [No Accessory Muscle Use] : no accessory muscle use [Clear to Auscultation] : lungs were clear to auscultation bilaterally [Normal Rate] : normal rate  [Regular Rhythm] : with a regular rhythm [Normal S1, S2] : normal S1 and S2 [No Carotid Bruits] : no carotid bruits [No Abdominal Bruit] : a ~M bruit was not heard ~T in the abdomen [No Varicosities] : no varicosities [No Edema] : there was no peripheral edema [No Palpable Aorta] : no palpable aorta [No Extremity Clubbing/Cyanosis] : no extremity clubbing/cyanosis [Soft] : abdomen soft [Non Tender] : non-tender [Non-distended] : non-distended [No HSM] : no HSM [Normal Bowel Sounds] : normal bowel sounds [Normal Posterior Cervical Nodes] : no posterior cervical lymphadenopathy [Normal Anterior Cervical Nodes] : no anterior cervical lymphadenopathy [No CVA Tenderness] : no CVA  tenderness [No Joint Swelling] : no joint swelling [No Spinal Tenderness] : no spinal tenderness [No Rash] : no rash [Grossly Normal Strength/Tone] : grossly normal strength/tone [Coordination Grossly Intact] : coordination grossly intact [No Focal Deficits] : no focal deficits [Normal Gait] : normal gait [Normal Affect] : the affect was normal [Normal Insight/Judgement] : insight and judgment were intact

## 2023-06-14 NOTE — PLAN
-will start vancomycin 1 gram BID, vanc trough  -tdap booster  -tylenol 650 prn [FreeTextEntry1] : In regards to patients Physical exam, reviewed recent blood work from April 24 2023, \par \par HTN- patient's BP well controlled with current medication. will continue current  regimen \par patient will continue to follow with Cardiologist Dr. Gonzales. \par \par Depression- symptoms well controlled with Lexapro 20 mg PO QD, patient denies suicidal ideation \par \par DM- Patient will continue Metformin 1000 mg PO BID and Glimepiride 2 mg PO QD, patient will follow with Endocrinologist \par \par In regards to patient's hypothyroidism, will re-test Thyroid levels today and adjust thyroid medication as indicated \par \par JAVON on CPAP- patient will continue to follow with Pulmonologist \par \par CHronic lower back pain- patient will continue to see Pain management Dr. Kevin at [facility].

## 2023-06-21 ENCOUNTER — APPOINTMENT (OUTPATIENT)
Dept: ORTHOPEDIC SURGERY | Facility: CLINIC | Age: 83
End: 2023-06-21
Payer: MEDICARE

## 2023-06-21 VITALS — DIASTOLIC BLOOD PRESSURE: 64 MMHG | SYSTOLIC BLOOD PRESSURE: 142 MMHG | HEART RATE: 85 BPM

## 2023-06-21 PROCEDURE — 99214 OFFICE O/P EST MOD 30 MIN: CPT | Mod: 25

## 2023-06-21 PROCEDURE — 20610 DRAIN/INJ JOINT/BURSA W/O US: CPT | Mod: 50

## 2023-06-21 NOTE — HISTORY OF PRESENT ILLNESS
[6] : a current pain level of 6/10 [1] : a minimum pain level of 1/10 [9] : a maximum pain level of 9/10 [Stable] : stable [de-identified] :  81 y/o F pt presents today with bilateral knee pain. She is s/p S/P Left TKA on 9/28/21 and Right TKA on April 7, 2015. The pt c/o radiating pain on her knees. She states her pain intermittently goes up to her hips. She takes tylenol for pain relief. She feels like the knees hyperextend. She has been receiving right knee pes bursa cortisone injections in the past which gave her relief. She is here for repeat Pes Bursa injections. \par

## 2023-06-21 NOTE — PROCEDURE
[de-identified] : The patient we received a left knee i in the pes bursa\par \par I discussed at length with the patient the planned steroid and lidocaine injection. The risks, benefits, convalescence and alternatives were reviewed. The possible side effects discussed included but were not limited to: pain, swelling, heat, bleeding, and redness. Symptoms are generally mild but if they are extensive then contact the office. Giving pain relievers by mouth such as NSAIDs or Tylenol can generally treat the reactions to steroid and lidocaine. Rare cases of infection have been noted. Rash, hives and itching may occur post injection. If you have muscle pain or cramps, flushing and or swelling of the face, rapid heart beat, nausea, dizziness, fever, chills, headache, difficulty breathing, swelling in the arms or legs, or have a prickly feeling of your skin, contact a health care provider immediately. Following this discussion, the knee was prepped with Alcohol and under sterile condition the 80 mg Depo-Medrol and 6 cc Lidocaine injection was performed with a 20 gauge needle through a superolateral injection site. The needle was introduced into the joint, aspiration was performed to ensure intra-articular placement and the medication was injected. Upon withdrawal of the needle the site was cleaned with alcohol and a band aid applied. The patient tolerated the injection well and there were no adverse effects. Post injection instructions included no strenuous activity for 24 hours, cryotherapy and if there are any adverse effects to contact the office.\par .\par \par I injected her right knee along the anterior medial tibia in the pes bursa.\par \par I discussed at length with the patient the planned steroid and lidocaine injection. The risks, benefits, convalescence and alternatives were reviewed. The possible side effects discussed included but were not limited to: pain, swelling, heat, bleeding, and redness. Symptoms are generally mild but if they are extensive then contact the office. Giving pain relievers by mouth such as NSAIDs or Tylenol can generally treat the reactions to steroid and lidocaine. Rare cases of infection have been noted. Rash, hives and itching may occur post injection. If you have muscle pain or cramps, flushing and or swelling of the face, rapid heart beat, nausea, dizziness, fever, chills, headache, difficulty breathing, swelling in the arms or legs, or have a prickly feeling of your skin, contact a health care provider immediately. Following this discussion, the knee was prepped with Alcohol and under sterile condition the 80 mg Depo-Medrol and 6 cc Lidocaine injection was performed with a 20 gauge needle through a superolateral injection site. The needle was introduced into the joint, aspiration was performed to ensure intra-articular placement and the medication was injected. Upon withdrawal of the needle the site was cleaned with alcohol and a band aid applied. The patient tolerated the injection well and there were no adverse effects. Post injection instructions included no strenuous activity for 24 hours, cryotherapy and if there are any adverse effects to contact the office.\par

## 2023-06-21 NOTE — PHYSICAL EXAM
[UE/LE] : Sensory: Intact in bilateral upper & lower extremities [ALL] : dorsalis pedis, posterior tibial, femoral, popliteal, and radial 2+ and symmetric bilaterally [Antalgic] : not antalgic [Apractic] : not apractic [Ataxic] : not ataxic [Coxalgic] : not coxalgic [de-identified] : GENERAL APPEARANCE: Well nourished and hydrated, pleasant, alert, and oriented x 3. Appears their stated age. \par HEENT: Normocephalic, extraocular eye motion intact. Nasal septum midline. Oral cavity clear. External auditory canal clear. \par RESPIRATORY: Breath sounds clear and audible in all lobes. No wheezing, No accessory muscle use.\par CARDIOVASCULAR: No apparent abnormalities. No lower leg edema. No varicosities. Pedal pulses are palpable.\par NEUROLOGIC: Sensation is normal, no muscle weakness in the upper or lower extremities.\par DERMATOLOGIC: No apparent skin lesions, moist, warm, no rash.\par SPINE: Cervical spine appears normal and moves freely; thoracic spine appears normal and moves freely; lumbosacral spine appears normal and moves freely, normal, nontender.\par MUSCULOSKELETAL: Hands, wrists, and elbows are normal and move freely, shoulders are normal and move freely. \par Musculoskeletal: Gait: normal and not antalgic . Examination of bilateral  knee demonstrates healed midline incision, no obvious signs of infection, no joint effusion, no erythema, smooth and active full range of motion without pain 0- 120 degrees, tenderness over the pes anserine bursa. \par \par  \par 5/5 motor strength in bilateral lower extremities. Sensory: Intact in bilateral lower extremities. DTRs: Biceps, brachioradialis, triceps, patellar, ankle and plantar 2+ and symmetric bilaterally. Pulses: dorsalis pedis, posterior tibial, femoral, popliteal, and radial 2+ and symmetric bilaterally. \par Constitutional: Alert and in no acute distress and obese, but well-appearing and not in acute distress. \par

## 2023-06-21 NOTE — END OF VISIT
[FreeTextEntry3] : I, Israel Fierro, acted solely as a scribe for Dr. Nicholas Davidson on 06/21/2023

## 2023-06-21 NOTE — DISCUSSION/SUMMARY
[Medication Risks Reviewed] : Medication risks reviewed [de-identified] : 81 y/o F pt is s/p bilateral total knee arthroplasty. The nature of her condition and treatment options were discussed in detail. She c/o persistent anterior medial knee pain consistent with bilateral pes bursitis. We reassured the pt that her implants are stable with no evidence of loosening or wear. The pt does not need revision surgery. The pt receives intermittent pes bursa injections which help. We recommend that she continues with bilateral knee pes bursa injections which has given her relief in the past. Today, she received repeat bilateral pes injections today which she tolerated well. She will f/u in 3 months for repeat injections if needed. \par

## 2023-07-13 ENCOUNTER — RX ONLY (RX ONLY)
Age: 83
End: 2023-07-13

## 2023-07-13 ENCOUNTER — OFFICE (OUTPATIENT)
Dept: URBAN - METROPOLITAN AREA CLINIC 104 | Facility: CLINIC | Age: 83
Setting detail: OPHTHALMOLOGY
End: 2023-07-13
Payer: MEDICARE

## 2023-07-13 DIAGNOSIS — E11.9: ICD-10-CM

## 2023-07-13 DIAGNOSIS — Z79.84: ICD-10-CM

## 2023-07-13 DIAGNOSIS — H04.123: ICD-10-CM

## 2023-07-13 DIAGNOSIS — H43.813: ICD-10-CM

## 2023-07-13 DIAGNOSIS — Z96.1: ICD-10-CM

## 2023-07-13 DIAGNOSIS — H01.001: ICD-10-CM

## 2023-07-13 DIAGNOSIS — H01.004: ICD-10-CM

## 2023-07-13 DIAGNOSIS — H01.005: ICD-10-CM

## 2023-07-13 DIAGNOSIS — H01.002: ICD-10-CM

## 2023-07-13 PROCEDURE — 92014 COMPRE OPH EXAM EST PT 1/>: CPT | Performed by: SPECIALIST

## 2023-07-13 PROCEDURE — 92015 DETERMINE REFRACTIVE STATE: CPT | Performed by: SPECIALIST

## 2023-07-13 ASSESSMENT — REFRACTION_CURRENTRX
OD_ADD: +2.25
OS_AXIS: 079
OD_SPHERE: +2.25
OD_AXIS: 099
OS_CYLINDER: -2.25
OS_OVR_VA: 20/
OS_ADD: +2.25
OD_CYLINDER: -2.75
OS_SPHERE: +1.75
OD_OVR_VA: 20/

## 2023-07-13 ASSESSMENT — AXIALLENGTH_DERIVED
OS_AL: 23.84
OS_AL: 23.64
OD_AL: 23.79
OD_AL: 23.69

## 2023-07-13 ASSESSMENT — KERATOMETRY
OS_AXISANGLE_DEGREES: 174
OS_K2POWER_DIOPTERS: 42.72
OD_AXISANGLE_DEGREES: 007
OD_K2POWER_DIOPTERS: 42.99
OD_K1POWER_DIOPTERS: 41.11
OS_K1POWER_DIOPTERS: 41.36

## 2023-07-13 ASSESSMENT — REFRACTION_MANIFEST
OS_ADD: +2.50
OS_SPHERE: +2.00
OD_AXIS: 095
OS_AXIS: 084
OD_VA1: 20/25
OD_ADD: +2.50
OD_SPHERE: +2.50
OS_VA1: 20/25
OS_CYLINDER: -2.50
OD_CYLINDER: -3.25

## 2023-07-13 ASSESSMENT — REFRACTION_AUTOREFRACTION
OD_SPHERE: +2.75
OD_CYLINDER: -3.25
OS_AXIS: 083
OD_AXIS: 099
OS_SPHERE: +2.50
OS_CYLINDER: -2.50

## 2023-07-13 ASSESSMENT — TONOMETRY
OS_IOP_MMHG: 17
OD_IOP_MMHG: 17

## 2023-07-13 ASSESSMENT — SPHEQUIV_DERIVED
OS_SPHEQUIV: 1.25
OS_SPHEQUIV: 0.75
OD_SPHEQUIV: 1.125
OD_SPHEQUIV: 0.875

## 2023-07-13 ASSESSMENT — CONFRONTATIONAL VISUAL FIELD TEST (CVF)
OD_FINDINGS: FULL
OS_FINDINGS: FULL

## 2023-07-13 ASSESSMENT — VISUAL ACUITY
OD_BCVA: 20/30
OS_BCVA: 20/40

## 2023-07-13 ASSESSMENT — TEAR BREAK UP TIME (TBUT)
OD_TBUT: 2+
OS_TBUT: 2+

## 2023-07-13 ASSESSMENT — LID EXAM ASSESSMENTS
OS_BLEPHARITIS: LLL LUL 2+
OD_BLEPHARITIS: RLL RUL 2+

## 2023-08-02 ENCOUNTER — APPOINTMENT (OUTPATIENT)
Dept: FAMILY MEDICINE | Facility: CLINIC | Age: 83
End: 2023-08-02

## 2023-08-04 ENCOUNTER — APPOINTMENT (OUTPATIENT)
Dept: CARDIOLOGY | Facility: CLINIC | Age: 83
End: 2023-08-04
Payer: MEDICARE

## 2023-08-04 VITALS
SYSTOLIC BLOOD PRESSURE: 140 MMHG | BODY MASS INDEX: 44.39 KG/M2 | DIASTOLIC BLOOD PRESSURE: 58 MMHG | WEIGHT: 260 LBS | HEART RATE: 85 BPM | OXYGEN SATURATION: 94 % | HEIGHT: 64 IN

## 2023-08-04 DIAGNOSIS — R60.9 EDEMA, UNSPECIFIED: ICD-10-CM

## 2023-08-04 DIAGNOSIS — R60.0 LOCALIZED EDEMA: ICD-10-CM

## 2023-08-04 PROCEDURE — 93000 ELECTROCARDIOGRAM COMPLETE: CPT

## 2023-08-04 PROCEDURE — 99214 OFFICE O/P EST MOD 30 MIN: CPT

## 2023-08-04 RX ORDER — TORSEMIDE 20 MG/1
20 TABLET ORAL
Qty: 90 | Refills: 3 | Status: ACTIVE | COMMUNITY
Start: 2023-08-04

## 2023-08-07 ENCOUNTER — APPOINTMENT (OUTPATIENT)
Dept: PULMONOLOGY | Facility: CLINIC | Age: 83
End: 2023-08-07

## 2023-08-29 ENCOUNTER — LABORATORY RESULT (OUTPATIENT)
Age: 83
End: 2023-08-29

## 2023-08-29 LAB
ALBUMIN SERPL ELPH-MCNC: 4.2 G/DL
ALP BLD-CCNC: 74 U/L
ALT SERPL-CCNC: 23 U/L
ANION GAP SERPL CALC-SCNC: 12 MMOL/L
AST SERPL-CCNC: 23 U/L
BILIRUB SERPL-MCNC: 0.3 MG/DL
BUN SERPL-MCNC: 17 MG/DL
CALCIUM SERPL-MCNC: 10.1 MG/DL
CHLORIDE SERPL-SCNC: 105 MMOL/L
CHOLEST SERPL-MCNC: 138 MG/DL
CO2 SERPL-SCNC: 24 MMOL/L
CREAT SERPL-MCNC: 1.06 MG/DL
EGFR: 53 ML/MIN/1.73M2
GLUCOSE SERPL-MCNC: 125 MG/DL
HCT VFR BLD CALC: 38.1 %
HDLC SERPL-MCNC: 49 MG/DL
HGB BLD-MCNC: 11.8 G/DL
LDLC SERPL CALC-MCNC: 65 MG/DL
MCHC RBC-ENTMCNC: 28.9 PG
MCHC RBC-ENTMCNC: 31 GM/DL
MCV RBC AUTO: 93.4 FL
NONHDLC SERPL-MCNC: 89 MG/DL
PLATELET # BLD AUTO: 354 K/UL
POTASSIUM SERPL-SCNC: 5 MMOL/L
PROT SERPL-MCNC: 6.9 G/DL
RBC # BLD: 4.08 M/UL
RBC # FLD: 15.2 %
SODIUM SERPL-SCNC: 141 MMOL/L
TRIGL SERPL-MCNC: 137 MG/DL
WBC # FLD AUTO: 9.13 K/UL

## 2023-08-30 ENCOUNTER — APPOINTMENT (OUTPATIENT)
Dept: ENDOCRINOLOGY | Facility: CLINIC | Age: 83
End: 2023-08-30
Payer: MEDICARE

## 2023-08-30 VITALS
WEIGHT: 267 LBS | DIASTOLIC BLOOD PRESSURE: 70 MMHG | HEIGHT: 64 IN | HEART RATE: 75 BPM | SYSTOLIC BLOOD PRESSURE: 138 MMHG | BODY MASS INDEX: 45.58 KG/M2 | OXYGEN SATURATION: 98 %

## 2023-08-30 DIAGNOSIS — M81.0 AGE-RELATED OSTEOPOROSIS W/OUT CURRENT PATHOLOGICAL FRACTURE: ICD-10-CM

## 2023-08-30 LAB
ESTIMATED AVERAGE GLUCOSE: 146 MG/DL
GLUCOSE BLDC GLUCOMTR-MCNC: 100
HBA1C MFR BLD HPLC: 6.7 %

## 2023-08-30 PROCEDURE — 99214 OFFICE O/P EST MOD 30 MIN: CPT

## 2023-08-30 PROCEDURE — 82962 GLUCOSE BLOOD TEST: CPT

## 2023-08-30 RX ORDER — CELECOXIB 200 MG/1
200 CAPSULE ORAL
Qty: 28 | Refills: 0 | Status: DISCONTINUED | COMMUNITY
Start: 2022-12-02 | End: 2023-08-30

## 2023-08-30 RX ORDER — PEN NEEDLE, DIABETIC 32GX 5/32"
32G X 4 MM NEEDLE, DISPOSABLE MISCELLANEOUS
Qty: 1 | Refills: 0 | Status: DISCONTINUED | COMMUNITY
Start: 2022-06-24 | End: 2023-08-30

## 2023-08-30 RX ORDER — LEVOTHYROXINE SODIUM 0.05 MG/1
50 TABLET ORAL DAILY
Qty: 90 | Refills: 2 | Status: ACTIVE | COMMUNITY
Start: 1900-01-01 | End: 1900-01-01

## 2023-08-30 NOTE — HISTORY OF PRESENT ILLNESS
[FreeTextEntry1] : INTERVAL HISTORY: Limited activity due to sciatica and knee pain, getting steroid injections in knees and back. Last injection was 3 weeks ago.  Type: 2 Severity: moderate Duration: chronic  Associated symptoms- Last eye exam: 2023, no DR per patient Last foot exam: occasional tingling in B/L feet Kidney disease: none Heart disease: none  Modifying factors- Current meds for glycemic control: Metformin 1000 mg BID Glimepiride 2 mg daily at bedtime  Past meds: unable to tolerate GLP1 agonist  Diet: Likes to eat pasta. B- English muffin with two slices of chest L- sandwich D- meat and vegetables, pasta Weight: stable Exercise: none due to sciatica and knee pain, getting epidural injection next week  SMBG once every few days, fasting in AM. Reports BG is usually 100s to 110s.  Current B, slice of toast for breakfast 4 hours ago. ============================ NTMNG s/p nondiagnostic FNA of LEFT 1.3 cm nodule 2018. Thyroid sonogram 2022: RMP 1.0 cm heterogenous nodule, stable. LLP 1.4 x 1.1 x 1.1 cm peripherally calcified nodule s/p nondiagnostic FNA 2018, stable. Denies dysphagia, anterior neck pain, and voice changes. ============================ Hypothyroidism Current regimen: LT4 50 mg daily, takes appropriately ============================ Osteoporosis with normal BMD, likely due to arthritic changes. Last DEXA 2022 Current regimen: no meds Calcium supplement: Woman's MVI Vitamin D supplement: 1000 IU daily Dietary calcium: two slices of cheese with breakfast; occasional yogurt Exercise: none due to sciatica pain Family history of OP/parental hip fracture: none

## 2023-08-30 NOTE — ASSESSMENT
[FreeTextEntry1] : 82 year old female with T2DM, hypothyroidism, osteoporosis, and NTMNG. Glycemic control is acceptable.  1. T2DM- A1c 6.7%, slightly improved. -Continue Metformin and Glimepiride. -Continue SMBG. -Continue to work on lifestyle modifications- diet, exercise, and weight loss- to improve glycemic control. -Will continue to monitor GFR and UACR. Referred to nephrology but still has not gone. -Repeat A1c and RTO for follow-up with MD in 3 months.  2. NTMNG- asymptomatic s/p nondiagnostic FNA of left 1.3 cm nodule with rim calcification, stable on sonogram April 2023. -Repeat sonogram April 2024.  3. Hypothyroidism- euthyroid on replacement T4. -Continue current dose of LT4. -Repeat TFTs in 3 months.  4. Hyperlipidemia- LDL at target. -Continue statin.  5. Hypertension- controlled. -Continue current regimen.  6. Osteoporosis- normal bone density on DEXA from October 2022, likely due to arthritic changes. -Continue vitamin D 1000 IU daily. -Continue Woman's daily MVI. -Repeat DEXA October 2024.

## 2023-08-30 NOTE — PHYSICAL EXAM
[Well Developed] : well developed [Well Nourished] : well nourished [No Acute Distress] : no acute distress [Normal Conjunctiva] : normal conjunctiva [Normal Venous Pressure] : normal venous pressure [No Carotid Bruit] : no carotid bruit [Normal S1, S2] : normal S1, S2 [No Murmur] : no murmur [No Rub] : no rub [Clear Lung Fields] : clear lung fields [Good Air Entry] : good air entry [No Respiratory Distress] : no respiratory distress  [Soft] : abdomen soft [Non Tender] : non-tender [Normal Bowel Sounds] : normal bowel sounds [Normal Gait] : normal gait [Venous varicosities] : venous varicosities [No Rash] : no rash [Moves all extremities] : moves all extremities [No Focal Deficits] : no focal deficits [Alert and Oriented] : alert and oriented [Normal memory] : normal memory [de-identified] : trace edema - much improved compared to the past

## 2023-08-30 NOTE — HISTORY OF PRESENT ILLNESS
[FreeTextEntry1] : 82 year old female with a history of DM, HTN, HLD, LBBB, JAVON (Uses CPAP nightly), obesity, and hypothyroidism. She presents for routine follow up. She is s/p left knee replacement last year. She tolerated well. She has chronic exertional dyspnea which she states is slightly improved. She lost ~10 lbs over the last few months feels this is helping her breathing. Denies chest pain, palpitations, near syncope or syncope. Has chronic LE edema L>R. LHC done 7/15/16 revealed normal coronaries. NST done 6/2019 showed no ischemia and LVEF 48%. TTE done 2/17/2023 showed LVEF 50% and mild PH and aortic sclerosis. She is stable and doing well from the cardiac perspective.  She is taking only 1/2 the torsemide which is managing the edema.

## 2023-08-30 NOTE — DISCUSSION/SUMMARY
[FreeTextEntry1] : 82 year old female with a history of DM, HTN, HLD, LBBB, JAVON (Uses CPAP nightly), obesity, and hypothyroidism.  She has chronic exertional dyspnea which she states is slightly improved. She lost ~10 lbs over the last few months feels this is helping her breathing. She is taking only 1/2 the torsemide which is managing the edema.  She will cotniue her current medications and follow up in 6 months.

## 2023-08-30 NOTE — PHYSICAL EXAM
[Alert] : alert [Well Nourished] : well nourished [No Acute Distress] : no acute distress [Well Developed] : well developed [Normal Sclera/Conjunctiva] : normal sclera/conjunctiva [EOMI] : extra ocular movement intact [No Proptosis] : no proptosis [Thyroid Not Enlarged] : the thyroid was not enlarged [No Thyroid Nodules] : no palpable thyroid nodules [No Respiratory Distress] : no respiratory distress [No Accessory Muscle Use] : no accessory muscle use [Clear to Auscultation] : lungs were clear to auscultation bilaterally [Normal S1, S2] : normal S1 and S2 [Normal Rate] : heart rate was normal [Regular Rhythm] : with a regular rhythm [Normal Anterior Cervical Nodes] : no anterior cervical lymphadenopathy [Oriented x3] : oriented to person, place, and time [Normal Affect] : the affect was normal [Normal Mood] : the mood was normal [Acanthosis Nigricans] : no acanthosis nigricans [de-identified] : B/L LE edema, L>R 2+

## 2023-08-30 NOTE — REVIEW OF SYSTEMS
[Pain/Numbness of Digits] : pain/numbness of digits [Recent Weight Gain (___ Lbs)] : no recent weight gain [Recent Weight Loss (___ Lbs)] : no recent weight loss [Dysphagia] : no dysphagia [Neck Pain] : no neck pain [Dysphonia] : no dysphonia [Chest Pain] : no chest pain [Palpitations] : no palpitations [Shortness Of Breath] : no shortness of breath [Nausea] : no nausea [Abdominal Pain] : no abdominal pain [Vomiting] : no vomiting [Polyuria] : no polyuria [Polydipsia] : no polydipsia

## 2023-09-13 ENCOUNTER — APPOINTMENT (OUTPATIENT)
Dept: INTERNAL MEDICINE | Facility: CLINIC | Age: 83
End: 2023-09-13

## 2023-09-20 ENCOUNTER — APPOINTMENT (OUTPATIENT)
Dept: ORTHOPEDIC SURGERY | Facility: CLINIC | Age: 83
End: 2023-09-20
Payer: MEDICARE

## 2023-09-20 VITALS
SYSTOLIC BLOOD PRESSURE: 120 MMHG | HEART RATE: 90 BPM | HEIGHT: 64 IN | WEIGHT: 267 LBS | BODY MASS INDEX: 45.58 KG/M2 | DIASTOLIC BLOOD PRESSURE: 74 MMHG

## 2023-09-20 PROCEDURE — 20610 DRAIN/INJ JOINT/BURSA W/O US: CPT | Mod: 50

## 2023-09-20 PROCEDURE — 99214 OFFICE O/P EST MOD 30 MIN: CPT | Mod: 25

## 2023-10-18 ENCOUNTER — APPOINTMENT (OUTPATIENT)
Dept: FAMILY MEDICINE | Facility: CLINIC | Age: 83
End: 2023-10-18

## 2023-11-01 LAB
25(OH)D3 SERPL-MCNC: 46.9 NG/ML
ALBUMIN SERPL ELPH-MCNC: 4.2 G/DL
ALP BLD-CCNC: 79 U/L
ALT SERPL-CCNC: 17 U/L
ANION GAP SERPL CALC-SCNC: 11 MMOL/L
AST SERPL-CCNC: 20 U/L
BILIRUB SERPL-MCNC: 0.3 MG/DL
BUN SERPL-MCNC: 21 MG/DL
CALCIUM SERPL-MCNC: 9.9 MG/DL
CHLORIDE SERPL-SCNC: 105 MMOL/L
CHOLEST SERPL-MCNC: 145 MG/DL
CO2 SERPL-SCNC: 25 MMOL/L
CREAT SERPL-MCNC: 1.24 MG/DL
EGFR: 43 ML/MIN/1.73M2
ESTIMATED AVERAGE GLUCOSE: 140 MG/DL
GLUCOSE SERPL-MCNC: 101 MG/DL
HBA1C MFR BLD HPLC: 6.5 %
HDLC SERPL-MCNC: 57 MG/DL
LDLC SERPL CALC-MCNC: 71 MG/DL
NONHDLC SERPL-MCNC: 89 MG/DL
POTASSIUM SERPL-SCNC: 4.7 MMOL/L
PROT SERPL-MCNC: 7.1 G/DL
SODIUM SERPL-SCNC: 141 MMOL/L
TRIGL SERPL-MCNC: 93 MG/DL
TSH SERPL-ACNC: 2.6 UIU/ML
VIT B12 SERPL-MCNC: 550 PG/ML

## 2023-11-02 ENCOUNTER — APPOINTMENT (OUTPATIENT)
Dept: ENDOCRINOLOGY | Facility: CLINIC | Age: 83
End: 2023-11-02
Payer: MEDICARE

## 2023-11-02 VITALS
BODY MASS INDEX: 44.29 KG/M2 | DIASTOLIC BLOOD PRESSURE: 68 MMHG | HEART RATE: 94 BPM | SYSTOLIC BLOOD PRESSURE: 124 MMHG | WEIGHT: 258 LBS | OXYGEN SATURATION: 96 %

## 2023-11-02 PROCEDURE — 99214 OFFICE O/P EST MOD 30 MIN: CPT | Mod: 25

## 2023-11-02 PROCEDURE — G0447 BEHAVIOR COUNSEL OBESITY 15M: CPT | Mod: 59

## 2023-11-02 RX ORDER — BLOOD-GLUCOSE METER
W/DEVICE EACH MISCELLANEOUS
Qty: 1 | Refills: 0 | Status: ACTIVE | COMMUNITY
Start: 2022-07-11 | End: 1900-01-01

## 2023-11-28 NOTE — PLAN
[FreeTextEntry1] : BREAST SURGERY EVALUATION RECOMMENDED\par LAB WORK REVIEWED\par WILL RECHECK CBC; HAD SINUS INFECTION AT TIME OF LAB WORK\par BLOOD PRESSURE CONTROLLED\par CONTINUE CURRENT MEDICATIONS\par FEELING WELL ON LEXAPRO - WILL CONTINUE CURRENT DOSAGE\par FOLLOW-UP ALL SPECIALISTS AS DIRECTED \par CALL WITH ANY QUESTIONS, CONCERNS OR CHANGES 
29-Nov-2023

## 2023-12-07 ENCOUNTER — RX RENEWAL (OUTPATIENT)
Age: 83
End: 2023-12-07

## 2023-12-07 RX ORDER — DOXAZOSIN 2 MG/1
2 TABLET ORAL
Qty: 90 | Refills: 3 | Status: ACTIVE | COMMUNITY
Start: 2022-12-22 | End: 1900-01-01

## 2023-12-21 ENCOUNTER — APPOINTMENT (OUTPATIENT)
Dept: PULMONOLOGY | Facility: CLINIC | Age: 83
End: 2023-12-21
Payer: MEDICARE

## 2023-12-21 VITALS
HEART RATE: 88 BPM | WEIGHT: 258 LBS | HEIGHT: 64 IN | BODY MASS INDEX: 44.05 KG/M2 | OXYGEN SATURATION: 96 % | SYSTOLIC BLOOD PRESSURE: 126 MMHG | DIASTOLIC BLOOD PRESSURE: 70 MMHG | RESPIRATION RATE: 16 BRPM

## 2023-12-21 DIAGNOSIS — R06.09 OTHER FORMS OF DYSPNEA: ICD-10-CM

## 2023-12-21 PROCEDURE — 99214 OFFICE O/P EST MOD 30 MIN: CPT

## 2023-12-21 NOTE — END OF VISIT
[Time Spent: ___ minutes] : I have spent [unfilled] minutes of time on the encounter. [TextEntry] : Discussed with pt at length regarding JAVON, obesity, soboe, prior compliance; reviewed w/u with pt as above.

## 2023-12-21 NOTE — VITALS
[TextEntry] : Ex-ox from 12/20/22: The patient was 94% on RA at rest and desaturated to 92% on RA with exertion. HR remained around 80 bpm despite SOB.

## 2023-12-21 NOTE — HISTORY OF PRESENT ILLNESS
[Excess Weight] : excess weight [Inability to Lose Weight] : inability to lose weight [Currently Experiencing] : The patient is currently experiencing symptoms. [Dyspnea] : dyspnea [Joint Pain] : joint pain [Low Calorie Diet] : low calorie diet [Fair Compliance] : fair compliance with treatment [Fair Tolerance] : fair tolerance of treatment [Poor Symptom Control] : poor symptom control [Sleep Apnea] : sleep apnea [Diabetes] : diabetes [Hypertension] : hypertension [High] : high [Low Calorie] : low calorie [Well Balanced Diet] : well balanced meals [Infrequently] : exercises infrequently [Aerobic Conditioning] : aerobic conditioning [Follow-Up - Routine Clinic] : a routine clinic follow-up of [CPAP] : CPAP [Good Compliance] : good compliance with treatment [Good Tolerance] : good tolerance of treatment [Good Symptom Control] : good symptom control [On ___] : performed on [unfilled] [Patient] : the patient [To Assess ___] : to assess [unfilled] [None] : no new symptoms reported [TextBox_4] : Pt is a former smoker of 1 ppd x 25 years, quit 1998\par  Lung function testing with restrictive pattern though normal lung volumes and moderately reduced diffusion capacity which corrected for alveolar volume. \par  Patient c/o SOBOE but is otherwise without associated respiratory complaints.\par  Pt was followed with Dr. White for her pulmonary issues but reports only SOBOE currently.\par   [FreeTextEntry1] : \par   [de-identified] : 10-16 cm of water  [FreeTextEntry9] : Chest CTA [FreeTextEntry8] : Essentially clear without PE

## 2023-12-21 NOTE — DISCUSSION/SUMMARY
[FreeTextEntry1] : #1. Continue APAP 10-16 cm of water for severe JAVON; gave pt Airtouch F20 in the past but would like to try the AirFit F30i FFM which I gave to pt to try. #2. Diet and exercise for weight loss. #3. Prior PFTs were essentially normal but now with mild restriction but normal lung volumes, likely related to weight. Repeat lung function with weight loss. #4. The patient does not appear to require chronic BD therapy at this time. #5. Replace equipment as needed; ordered 3/17/23 with AirFit F30i; doing well with new machine. #6. Cardiology f/u for SOBOE given no increase in HR with exertion. #7. F/u in 3 months with compliance on new mask and with HST with therapy but without O2 to assess need for supplemental O2 with therapy as she is using currently.  The patient expressed understanding and agreement with the above recommendations/plan and accepts responsibility to be compliant with recommended testing, therapies, and f/u visits. All relevant questions and concerns were addressed.

## 2023-12-21 NOTE — RESULTS/DATA
[TextEntry] : S/N PSG from 10/20/16 revealed severe JAVON with an AHI of 93.6 during the diagnostic portion and a therapeutic pressure of 14 cm of water The patient's overall compliance is % with a >4hr compliance of % on autoCPAP with a median and max pressure of 10.2-12.8 and 12.2-15.6 cm of water, respectively with a residual AHI of 2.3-2.4 which is normal.

## 2024-01-10 ENCOUNTER — APPOINTMENT (OUTPATIENT)
Dept: ORTHOPEDIC SURGERY | Facility: CLINIC | Age: 84
End: 2024-01-10
Payer: MEDICARE

## 2024-01-10 PROCEDURE — 99214 OFFICE O/P EST MOD 30 MIN: CPT | Mod: 25

## 2024-01-10 PROCEDURE — 20610 DRAIN/INJ JOINT/BURSA W/O US: CPT | Mod: LT

## 2024-01-10 NOTE — DISCUSSION/SUMMARY
[Medication Risks Reviewed] : Medication risks reviewed [de-identified] : 82 y/o F pt is s/p bilateral total knee arthroplasty. The nature of her condition and treatment options were discussed in detail. She c/o persistent anterior medial knee pain consistent with bilateral pes bursitis. We reassured the pt that her implants are stable with no evidence of loosening or wear. The pt does not need revision surgery. The pt receives intermittent pes bursa injections which help. We recommend that she continues with bilateral knee pes bursa injections which has given her relief in the past. Today, she received repeat bilateral pes injections today which she tolerated well. She will f/u in 3 months for repeat injections if needed.

## 2024-01-10 NOTE — HISTORY OF PRESENT ILLNESS
[Stable] : stable [6] : a current pain level of 6/10 [1] : a minimum pain level of 1/10 [9] : a maximum pain level of 9/10 [de-identified] : 84 y/o F pt presents today with bilateral medial knee pain. The pt s/p S/P Left TKA on 9/28/21 and Right TKA on April 7, 2015. The pt states she has intermittent and radiating pain on her knees. The pt does take Tylenol if needed due to allergy to NSAIDs. She reports her medial knee pain is worse with prolonged standing and navigating stairs. No weakness, numbness or tingling. No pain in the lateral aspect of the knee. The pt has been receiving intermittent pes bursa injections which does help her pain for 2.5 months. Her last injection was in September 2023, which wore off early Decembe but she deferred injection until after the holiday season. She is here to discuss repeat Pes Bursa injections.

## 2024-01-10 NOTE — PROCEDURE
[de-identified] : The patient received a left knee injection along the anterior medial tibia in the pes bursa.  I discussed at length with the patient the planned steroid and lidocaine injection. The risks, benefits, convalescence and alternatives were reviewed. The possible side effects discussed included but were not limited to: pain, swelling, heat, bleeding, and redness. Symptoms are generally mild but if they are extensive then contact the office. Giving pain relievers by mouth such as NSAIDs or Tylenol can generally treat the reactions to steroid and lidocaine. Rare cases of infection have been noted. Rash, hives and itching may occur post injection. If you have muscle pain or cramps, flushing and or swelling of the face, rapid heart beat, nausea, dizziness, fever, chills, headache, difficulty breathing, swelling in the arms or legs, or have a prickly feeling of your skin, contact a health care provider immediately. Following this discussion, the knee was prepped with Alcohol and under sterile condition the 80 mg Depo-Medrol and 6 cc Lidocaine injection was performed with a 20 gauge needle through a superolateral injection site. The needle was introduced into the joint, aspiration was performed to ensure intra-articular placement and the medication was injected. Upon withdrawal of the needle the site was cleaned with alcohol and a band aid applied. The patient tolerated the injection well and there were no adverse effects. Post injection instructions included no strenuous activity for 24 hours, cryotherapy and if there are any adverse effects to contact the office. .  The patient received a right knee injection along the anterior medial tibia in the pes bursa.  I discussed at length with the patient the planned steroid and lidocaine injection. The risks, benefits, convalescence and alternatives were reviewed. The possible side effects discussed included but were not limited to: pain, swelling, heat, bleeding, and redness. Symptoms are generally mild but if they are extensive then contact the office. Giving pain relievers by mouth such as NSAIDs or Tylenol can generally treat the reactions to steroid and lidocaine. Rare cases of infection have been noted. Rash, hives and itching may occur post injection. If you have muscle pain or cramps, flushing and or swelling of the face, rapid heart beat, nausea, dizziness, fever, chills, headache, difficulty breathing, swelling in the arms or legs, or have a prickly feeling of your skin, contact a health care provider immediately. Following this discussion, the knee was prepped with Alcohol and under sterile condition the 80 mg Depo-Medrol and 6 cc Lidocaine injection was performed with a 20 gauge needle through a superolateral injection site. The needle was introduced into the joint, aspiration was performed to ensure intra-articular placement and the medication was injected. Upon withdrawal of the needle the site was cleaned with alcohol and a band aid applied. The patient tolerated the injection well and there were no adverse effects. Post injection instructions included no strenuous activity for 24 hours, cryotherapy and if there are any adverse effects to contact the office.

## 2024-01-10 NOTE — PHYSICAL EXAM
[UE/LE] : Sensory: Intact in bilateral upper & lower extremities [ALL] : dorsalis pedis, posterior tibial, femoral, popliteal, and radial 2+ and symmetric bilaterally [Antalgic] : not antalgic [Apractic] : not apractic [Ataxic] : not ataxic [Coxalgic] : not coxalgic [de-identified] : GENERAL APPEARANCE: Well nourished and hydrated, pleasant, alert, and oriented x 3. Appears their stated age.  HEENT: Normocephalic, extraocular eye motion intact. Nasal septum midline. Oral cavity clear. External auditory canal clear.  RESPIRATORY: Breath sounds clear and audible in all lobes. No wheezing, No accessory muscle use. CARDIOVASCULAR: No apparent abnormalities. No lower leg edema. No varicosities. Pedal pulses are palpable. NEUROLOGIC: Sensation is normal, no muscle weakness in the upper or lower extremities. DERMATOLOGIC: No apparent skin lesions, moist, warm, no rash. SPINE: Cervical spine appears normal and moves freely; thoracic spine appears normal and moves freely; lumbosacral spine appears normal and moves freely, normal, nontender. MUSCULOSKELETAL: Hands, wrists, and elbows are normal and move freely, shoulders are normal and move freely.  Musculoskeletal: Gait: normal and not antalgic . Examination of bilateral  knee demonstrates healed midline incision, no obvious signs of infection, no joint effusion, no erythema, smooth and active full range of motion without pain 0- 120 degrees, tenderness over the pes anserine bursa bilaterally  5/5 motor strength in bilateral lower extremities. Sensory: Intact in bilateral lower extremities. DTRs: Biceps, brachioradialis, triceps, patellar, ankle and plantar 2+ and symmetric bilaterally. Pulses: dorsalis pedis, posterior tibial, femoral, popliteal, and radial 2+ and symmetric bilaterally.  Constitutional: Alert and in no acute distress and obese, but well-appearing and not in acute distress.

## 2024-01-16 ENCOUNTER — APPOINTMENT (OUTPATIENT)
Dept: ORTHOPEDIC SURGERY | Facility: CLINIC | Age: 84
End: 2024-01-16

## 2024-01-25 DIAGNOSIS — R21 RASH AND OTHER NONSPECIFIC SKIN ERUPTION: ICD-10-CM

## 2024-02-23 ENCOUNTER — APPOINTMENT (OUTPATIENT)
Dept: CARDIOLOGY | Facility: CLINIC | Age: 84
End: 2024-02-23
Payer: MEDICARE

## 2024-02-23 VITALS
DIASTOLIC BLOOD PRESSURE: 82 MMHG | BODY MASS INDEX: 42.68 KG/M2 | OXYGEN SATURATION: 95 % | WEIGHT: 250 LBS | SYSTOLIC BLOOD PRESSURE: 130 MMHG | HEART RATE: 90 BPM | HEIGHT: 64 IN

## 2024-02-23 DIAGNOSIS — I44.7 LEFT BUNDLE-BRANCH BLOCK, UNSPECIFIED: ICD-10-CM

## 2024-02-23 DIAGNOSIS — I83.10 VARICOSE VEINS OF UNSPECIFIED LOWER EXTREMITY WITH INFLAMMATION: ICD-10-CM

## 2024-02-23 DIAGNOSIS — I42.8 OTHER CARDIOMYOPATHIES: ICD-10-CM

## 2024-02-23 PROCEDURE — 93000 ELECTROCARDIOGRAM COMPLETE: CPT

## 2024-02-23 PROCEDURE — 99214 OFFICE O/P EST MOD 30 MIN: CPT

## 2024-02-23 RX ORDER — VITAMIN B COMPLEX
CAPSULE ORAL DAILY
Refills: 0 | Status: ACTIVE | COMMUNITY

## 2024-03-14 NOTE — DISCUSSION/SUMMARY
[FreeTextEntry1] : 83 year old female with a history of DM, HTN, HLD, LBBB, JAVON (Uses CPAP nightly), obesity, and hypothyroidism. She presents for routine follow up. She is s/p left knee replacement last year. She tolerated well. She has chronic exertional dyspnea which she states is slightly improved. She lost ~10 lbs over the last few months feels this is helping her breathing.  She is stable and doing well from the cardiac perspective.  She is taking the torsemide once or twice a week which is managing the edema.  She has lost 17 lbs and her lipid profile is the best its ever been.  She will continue her current medications and follow up in 6 months.

## 2024-03-14 NOTE — PHYSICAL EXAM
[Well Nourished] : well nourished [Well Developed] : well developed [No Acute Distress] : no acute distress [Normal Conjunctiva] : normal conjunctiva [Normal Venous Pressure] : normal venous pressure [No Carotid Bruit] : no carotid bruit [Normal S1, S2] : normal S1, S2 [No Murmur] : no murmur [No Rub] : no rub [Clear Lung Fields] : clear lung fields [Good Air Entry] : good air entry [No Respiratory Distress] : no respiratory distress  [Soft] : abdomen soft [Non Tender] : non-tender [Normal Bowel Sounds] : normal bowel sounds [Normal Gait] : normal gait [Venous varicosities] : venous varicosities [No Rash] : no rash [Moves all extremities] : moves all extremities [No Focal Deficits] : no focal deficits [Alert and Oriented] : alert and oriented [Normal memory] : normal memory [de-identified] : trace edema - much improved compared to the past

## 2024-03-14 NOTE — HISTORY OF PRESENT ILLNESS
[FreeTextEntry1] : 83 year old female with a history of DM, HTN, HLD, LBBB, JAVON (Uses CPAP nightly), obesity, and hypothyroidism. She presents for routine follow up. She is s/p left knee replacement last year. She tolerated well. She has chronic exertional dyspnea which she states is slightly improved. She lost ~17 lbs over the last few months feels this is helping her breathing. Denies chest pain, palpitations, near syncope or syncope. Has chronic LE edema L>R. LHC done 7/15/16 revealed normal coronaries. NST done 6/2019 showed no ischemia and LVEF 48%. TTE done 2/17/2023 showed LVEF 50% and mild PH and aortic sclerosis. She is stable and doing well from the cardiac perspective.  She is taking the torsemide once or twice a week which is managing the edema.  She has lost 17 lbs and her lipid profile is the best its ever been.

## 2024-03-14 NOTE — CARDIOLOGY SUMMARY
[de-identified] : 2/23/2024:  Sinus Rhythm with rate variation  Left bundle branch block.  [de-identified] : 2/17/2023:  LVEF 50%, mild MR, aortic sclerosis, mild pulmonary hypertesnion

## 2024-04-01 LAB
HCT VFR BLD CALC: 39.6 %
HGB BLD-MCNC: 12.8 G/DL
MCHC RBC-ENTMCNC: 29 PG
MCHC RBC-ENTMCNC: 32.3 GM/DL
MCV RBC AUTO: 89.6 FL
PLATELET # BLD AUTO: 383 K/UL
RBC # BLD: 4.42 M/UL
RBC # FLD: 14.4 %
WBC # FLD AUTO: 10.97 K/UL

## 2024-04-02 LAB
ALBUMIN SERPL ELPH-MCNC: 4.2 G/DL
ALP BLD-CCNC: 86 U/L
ALT SERPL-CCNC: 17 U/L
ANION GAP SERPL CALC-SCNC: 17 MMOL/L
AST SERPL-CCNC: 18 U/L
BILIRUB SERPL-MCNC: 0.3 MG/DL
BUN SERPL-MCNC: 23 MG/DL
CALCIUM SERPL-MCNC: 10.3 MG/DL
CHLORIDE SERPL-SCNC: 102 MMOL/L
CHOLEST SERPL-MCNC: 148 MG/DL
CO2 SERPL-SCNC: 21 MMOL/L
CREAT SERPL-MCNC: 1.31 MG/DL
EGFR: 41 ML/MIN/1.73M2
ESTIMATED AVERAGE GLUCOSE: 140 MG/DL
GLUCOSE SERPL-MCNC: 177 MG/DL
HBA1C MFR BLD HPLC: 6.5 %
HDLC SERPL-MCNC: 57 MG/DL
LDLC SERPL CALC-MCNC: 71 MG/DL
NONHDLC SERPL-MCNC: 91 MG/DL
POTASSIUM SERPL-SCNC: 5.1 MMOL/L
PROT SERPL-MCNC: 7.1 G/DL
SODIUM SERPL-SCNC: 140 MMOL/L
TRIGL SERPL-MCNC: 109 MG/DL

## 2024-04-04 ENCOUNTER — APPOINTMENT (OUTPATIENT)
Dept: ENDOCRINOLOGY | Facility: CLINIC | Age: 84
End: 2024-04-04
Payer: MEDICARE

## 2024-04-04 VITALS
SYSTOLIC BLOOD PRESSURE: 132 MMHG | WEIGHT: 259.13 LBS | HEIGHT: 64 IN | HEART RATE: 83 BPM | BODY MASS INDEX: 44.24 KG/M2 | OXYGEN SATURATION: 94 % | DIASTOLIC BLOOD PRESSURE: 74 MMHG

## 2024-04-04 DIAGNOSIS — E11.40 TYPE 2 DIABETES MELLITUS WITH DIABETIC NEUROPATHY, UNSPECIFIED: ICD-10-CM

## 2024-04-04 DIAGNOSIS — E04.2 NONTOXIC MULTINODULAR GOITER: ICD-10-CM

## 2024-04-04 PROCEDURE — G2211 COMPLEX E/M VISIT ADD ON: CPT

## 2024-04-04 PROCEDURE — G0447 BEHAVIOR COUNSEL OBESITY 15M: CPT | Mod: 59

## 2024-04-04 PROCEDURE — 99214 OFFICE O/P EST MOD 30 MIN: CPT

## 2024-04-04 NOTE — ASSESSMENT
[Carbohydrate Consistent Diet] : carbohydrate consistent diet [Exercise/Effect on Glucose] : exercise/effect on glucose [Self Monitoring of Blood Glucose] : self monitoring of blood glucose [Retinopathy Screening] : Patient was referred to ophthalmology for retinopathy screening [Weight Loss] : weight loss [FreeTextEntry1] : 83 year old female with T2DM, hypothyroidism, osteoporosis, and NTMNG. Glycemic control is acceptable.  1. T2DM- A1c 6.5 .   Gained 9 lbs.  - Continue Metformin and Glimepiride. - start ozmepic weekly. taught administration . Discussed side effects and prevent hypoG. If Bgs 70-80 stop glimepiride.  She understands  - Discussed addition of GLP1 agonist for better glycemic control, potential reduction in insulin requirements, and weight loss. Reviewed potential side effects. Denies history of pancreatitis and family history of pancreatic cancer or thyroid cancer. - Continue SMBG. -Continue to work on lifestyle modifications- diet, exercise -Will continue to monitor GFR and UACR. Referred to nephrology but still has not gone. - repeat labs in 3 mos.   2. NTMNG- asymptomatic s/p nondiagnostic FNA of left 1.3 cm nodule with rim calcification, stable on sonogram April 2023. declines compressive sx. Repeat sonogram April 2024. check tfts   3. Hypothyroidism- Continue current dose of LT4. -Repeat TFTs in 3 months.  4. Hyperlipidemia- lipids very good. Continue statin.  5. Hypertension-  bp at target on meds. Continue current management. I advised low fat/low cholesterol diet, low salt diet.  6. Osteoporosis- normal bone density on DEXA from October 2022, likely due to arthritic changes. -Continue vitamin D 1000 IU daily and MVI  -Repeat DEXA October 2024.

## 2024-04-11 ENCOUNTER — APPOINTMENT (OUTPATIENT)
Dept: PULMONOLOGY | Facility: CLINIC | Age: 84
End: 2024-04-11
Payer: MEDICARE

## 2024-04-11 DIAGNOSIS — G47.33 OBSTRUCTIVE SLEEP APNEA (ADULT) (PEDIATRIC): ICD-10-CM

## 2024-04-11 DIAGNOSIS — R09.02 HYPOXEMIA: ICD-10-CM

## 2024-04-11 DIAGNOSIS — R06.02 SHORTNESS OF BREATH: ICD-10-CM

## 2024-04-11 DIAGNOSIS — R94.2 ABNORMAL RESULTS OF PULMONARY FUNCTION STUDIES: ICD-10-CM

## 2024-04-11 DIAGNOSIS — R06.09 OTHER FORMS OF DYSPNEA: ICD-10-CM

## 2024-04-11 DIAGNOSIS — Z87.891 PERSONAL HISTORY OF NICOTINE DEPENDENCE: ICD-10-CM

## 2024-04-11 DIAGNOSIS — E66.01 MORBID (SEVERE) OBESITY DUE TO EXCESS CALORIES: ICD-10-CM

## 2024-04-11 DIAGNOSIS — R06.83 SNORING: ICD-10-CM

## 2024-04-11 PROCEDURE — 99442: CPT

## 2024-04-11 NOTE — HISTORY OF PRESENT ILLNESS
[Home] : at home, [unfilled] , at the time of the visit. [Medical Office: ([facility])___] : at the medical office located in  [Verbal consent obtained from patient] : the patient, [unfilled] [TextBox_4] : Pt is a former smoker of 1 ppd x 25 years, quit 1998. Lung function testing with restrictive pattern though normal lung volumes and moderately reduced diffusion capacity which corrected for alveolar volume.  Patient c/o SOBOE but is otherwise without associated respiratory complaints. Pt was followed with Dr. White for her pulmonary issues but reports only SOBOE currently. Pt was doing well with PAP therapy but now needs a new mask. She reports her current mask leaks too much and she cannot use consistently over the past 2-3 weeks. No new compliance is available.  [FreeTextEntry1] : \par   [Excess Weight] : excess weight [Inability to Lose Weight] : inability to lose weight [Currently Experiencing] : The patient is currently experiencing symptoms. [Dyspnea] : dyspnea [Joint Pain] : joint pain [Low Calorie Diet] : low calorie diet [Fair Compliance] : fair compliance with treatment [Fair Tolerance] : fair tolerance of treatment [Poor Symptom Control] : poor symptom control [Sleep Apnea] : sleep apnea [Diabetes] : diabetes [Hypertension] : hypertension [High] : high [Low Calorie] : low calorie [Well Balanced Diet] : well balanced meals [Infrequently] : exercises infrequently [Aerobic Conditioning] : aerobic conditioning [Follow-Up - Routine Clinic] : a routine clinic follow-up of [CPAP] : CPAP [Good Compliance] : good compliance with treatment [Good Tolerance] : good tolerance of treatment [Good Symptom Control] : good symptom control [de-identified] : 10-16 cm of water  [On ___] : performed on [unfilled] [Patient] : the patient [To Assess ___] : to assess [unfilled] [None] : no new symptoms reported [FreeTextEntry9] : Chest CTA [FreeTextEntry8] : Essentially clear without PE

## 2024-04-11 NOTE — DISCUSSION/SUMMARY
[FreeTextEntry1] : #1. Continue APAP 10-16 cm of water for severe JAVON; gave pt Airtouch F20and the AirFit F30i FFM which she did not like. #2. Diet and exercise for weight loss. #3. Prior PFTs were essentially normal but now with mild restriction but normal lung volumes, likely related to weight. Repeat lung function with weight loss. #4. The patient does not appear to require chronic BD therapy at this time. #5. Replace equipment as needed; ordered 4/11/24 with AirTouch F20i; doing well with her machine otherwise. #6. Cardiology f/u for SOBOE given no increase in HR with exertion. #7. F/u in 2-3 months with compliance on new mask. Consider HST with therapy but without O2 to assess need for supplemental O2 with therapy as she is using currently once she is consistently compliant again.  The patient expressed understanding and agreement with the above recommendations/plan and accepts responsibility to be compliant with recommended testing, therapies, and f/u visits. All relevant questions and concerns were addressed.

## 2024-04-16 ENCOUNTER — APPOINTMENT (OUTPATIENT)
Dept: ORTHOPEDIC SURGERY | Facility: CLINIC | Age: 84
End: 2024-04-16
Payer: MEDICARE

## 2024-04-16 VITALS
BODY MASS INDEX: 42.68 KG/M2 | WEIGHT: 250 LBS | DIASTOLIC BLOOD PRESSURE: 66 MMHG | SYSTOLIC BLOOD PRESSURE: 130 MMHG | HEART RATE: 85 BPM | HEIGHT: 64 IN

## 2024-04-16 DIAGNOSIS — Z96.651 PRESENCE OF RIGHT ARTIFICIAL KNEE JOINT: ICD-10-CM

## 2024-04-16 DIAGNOSIS — Z47.1 AFTERCARE FOLLOWING JOINT REPLACEMENT SURGERY: ICD-10-CM

## 2024-04-16 DIAGNOSIS — Z96.652 PRESENCE OF LEFT ARTIFICIAL KNEE JOINT: ICD-10-CM

## 2024-04-16 DIAGNOSIS — Z96.651 AFTERCARE FOLLOWING JOINT REPLACEMENT SURGERY: ICD-10-CM

## 2024-04-16 DIAGNOSIS — Z96.652 AFTERCARE FOLLOWING JOINT REPLACEMENT SURGERY: ICD-10-CM

## 2024-04-16 DIAGNOSIS — M70.52 OTHER BURSITIS OF KNEE, RIGHT KNEE: ICD-10-CM

## 2024-04-16 DIAGNOSIS — M70.51 OTHER BURSITIS OF KNEE, RIGHT KNEE: ICD-10-CM

## 2024-04-16 PROCEDURE — 20610 DRAIN/INJ JOINT/BURSA W/O US: CPT | Mod: 50

## 2024-04-16 NOTE — PHYSICAL EXAM
[Antalgic] : not antalgic [Apractic] : not apractic [Ataxic] : not ataxic [Coxalgic] : not coxalgic [UE/LE] : Sensory: Intact in bilateral upper & lower extremities [ALL] : dorsalis pedis, posterior tibial, femoral, popliteal, and radial 2+ and symmetric bilaterally [de-identified] : GENERAL APPEARANCE: Well nourished and hydrated, pleasant, alert, and oriented x 3. Appears their stated age.  HEENT: Normocephalic, extraocular eye motion intact. Nasal septum midline. Oral cavity clear. External auditory canal clear.  RESPIRATORY: Breath sounds clear and audible in all lobes. No wheezing, No accessory muscle use. CARDIOVASCULAR: No apparent abnormalities. No lower leg edema. No varicosities. Pedal pulses are palpable. NEUROLOGIC: Sensation is normal, no muscle weakness in the upper or lower extremities. DERMATOLOGIC: No apparent skin lesions, moist, warm, no rash. SPINE: Cervical spine appears normal and moves freely; thoracic spine appears normal and moves freely; lumbosacral spine appears normal and moves freely, normal, nontender. MUSCULOSKELETAL: Hands, wrists, and elbows are normal and move freely, shoulders are normal and move freely.  Musculoskeletal: Gait: normal and not antalgic . Examination of bilateral  knee demonstrates healed midline incision, no obvious signs of infection, no joint effusion, no erythema, smooth and active full range of motion without pain 0- 120 degrees, tenderness over the pes anserine bursa.     5/5 motor strength in bilateral lower extremities. Sensory: Intact in bilateral lower extremities. DTRs: Biceps, brachioradialis, triceps, patellar, ankle and plantar 2+ and symmetric bilaterally. Pulses: dorsalis pedis, posterior tibial, femoral, popliteal, and radial 2+ and symmetric bilaterally.  Constitutional: Alert and in no acute distress and obese, but well-appearing and not in acute distress.

## 2024-04-16 NOTE — PROCEDURE
[de-identified] : The patient we received a left knee i in the pes bursa  I discussed at length with the patient the planned steroid and lidocaine injection. The risks, benefits, convalescence and alternatives were reviewed. The possible side effects discussed included but were not limited to: pain, swelling, heat, bleeding, and redness. Symptoms are generally mild but if they are extensive then contact the office. Giving pain relievers by mouth such as NSAIDs or Tylenol can generally treat the reactions to steroid and lidocaine. Rare cases of infection have been noted. Rash, hives and itching may occur post injection. If you have muscle pain or cramps, flushing and or swelling of the face, rapid heart beat, nausea, dizziness, fever, chills, headache, difficulty breathing, swelling in the arms or legs, or have a prickly feeling of your skin, contact a health care provider immediately. Following this discussion, the knee was prepped with Alcohol and under sterile condition the 80 mg Depo-Medrol and 6 cc Lidocaine injection was performed with a 20 gauge needle through a superolateral injection site. The needle was introduced into the joint, aspiration was performed to ensure intra-articular placement and the medication was injected. Upon withdrawal of the needle the site was cleaned with alcohol and a band aid applied. The patient tolerated the injection well and there were no adverse effects. Post injection instructions included no strenuous activity for 24 hours, cryotherapy and if there are any adverse effects to contact the office. .  I injected her right knee along the anterior medial tibia in the pes bursa.  I discussed at length with the patient the planned steroid and lidocaine injection. The risks, benefits, convalescence and alternatives were reviewed. The possible side effects discussed included but were not limited to: pain, swelling, heat, bleeding, and redness. Symptoms are generally mild but if they are extensive then contact the office. Giving pain relievers by mouth such as NSAIDs or Tylenol can generally treat the reactions to steroid and lidocaine. Rare cases of infection have been noted. Rash, hives and itching may occur post injection. If you have muscle pain or cramps, flushing and or swelling of the face, rapid heart beat, nausea, dizziness, fever, chills, headache, difficulty breathing, swelling in the arms or legs, or have a prickly feeling of your skin, contact a health care provider immediately. Following this discussion, the knee was prepped with Alcohol and under sterile condition the 80 mg Depo-Medrol and 6 cc Lidocaine injection was performed with a 20 gauge needle through a superolateral injection site. The needle was introduced into the joint, aspiration was performed to ensure intra-articular placement and the medication was injected. Upon withdrawal of the needle the site was cleaned with alcohol and a band aid applied. The patient tolerated the injection well and there were no adverse effects. Post injection instructions included no strenuous activity for 24 hours, cryotherapy and if there are any adverse effects to contact the office.

## 2024-04-16 NOTE — HISTORY OF PRESENT ILLNESS
[de-identified] : 84 y/o F pt presents today with bilateral knee pain. The pt s/p S/P Left TKA on 9/28/21 and Right TKA on April 7, 2015. She c/o radiating pain on her knees. She states her pain is intermittent and goes up her hips. She takes tylenol if needed. She c/o hyperextension of her knees. The pt has been recieving intermittent pes bursa injections which does help her pain for 2.5 months. She is here for repeat Pes Bursa injections.  [Pain Location] : pain [4] : a current pain level of 4/10

## 2024-04-16 NOTE — DISCUSSION/SUMMARY
[Medication Risks Reviewed] : Medication risks reviewed [de-identified] : repeat injection  84 y/o F pt is s/p bilateral total knee arthroplasty. The nature of her condition and treatment options were discussed in detail. She c/o persistent anterior medial knee pain consistent with bilateral pes bursitis. We reassured the pt that her implants are stable with no evidence of loosening or wear. The pt does not need revision surgery. The pt receives intermittent pes bursa injections which help. We recommend that she continues with bilateral knee pes bursa injections which has given her relief in the past. Today, she received repeat bilateral pes injections today which she tolerated well. She will f/u in 3 months for repeat injections if needed.

## 2024-04-17 ENCOUNTER — APPOINTMENT (OUTPATIENT)
Dept: ORTHOPEDIC SURGERY | Facility: CLINIC | Age: 84
End: 2024-04-17

## 2024-05-21 ENCOUNTER — RX RENEWAL (OUTPATIENT)
Age: 84
End: 2024-05-21

## 2024-05-21 RX ORDER — GLIMEPIRIDE 2 MG/1
2 TABLET ORAL
Qty: 90 | Refills: 1 | Status: ACTIVE | COMMUNITY
Start: 2019-09-17 | End: 1900-01-01

## 2024-06-12 RX ORDER — LANCETS
EACH MISCELLANEOUS
Qty: 90 | Refills: 0 | Status: ACTIVE | COMMUNITY
Start: 2020-01-15 | End: 1900-01-01

## 2024-06-12 RX ORDER — BLOOD SUGAR DIAGNOSTIC
STRIP MISCELLANEOUS
Qty: 100 | Refills: 1 | Status: ACTIVE | COMMUNITY
Start: 2020-10-27 | End: 1900-01-01

## 2024-06-17 ENCOUNTER — APPOINTMENT (OUTPATIENT)
Dept: INTERNAL MEDICINE | Facility: CLINIC | Age: 84
End: 2024-06-17

## 2024-06-17 ENCOUNTER — APPOINTMENT (OUTPATIENT)
Dept: INTERNAL MEDICINE | Facility: CLINIC | Age: 84
End: 2024-06-17
Payer: MEDICARE

## 2024-06-17 VITALS
HEART RATE: 87 BPM | WEIGHT: 263 LBS | DIASTOLIC BLOOD PRESSURE: 78 MMHG | SYSTOLIC BLOOD PRESSURE: 132 MMHG | BODY MASS INDEX: 44.9 KG/M2 | HEIGHT: 64 IN

## 2024-06-17 DIAGNOSIS — N18.30 CHRONIC KIDNEY DISEASE, STAGE 3 UNSPECIFIED: ICD-10-CM

## 2024-06-17 DIAGNOSIS — E55.9 VITAMIN D DEFICIENCY, UNSPECIFIED: ICD-10-CM

## 2024-06-17 DIAGNOSIS — Z00.00 ENCOUNTER FOR GENERAL ADULT MEDICAL EXAMINATION W/OUT ABNORMAL FINDINGS: ICD-10-CM

## 2024-06-17 DIAGNOSIS — R79.9 ABNORMAL FINDING OF BLOOD CHEMISTRY, UNSPECIFIED: ICD-10-CM

## 2024-06-17 DIAGNOSIS — Z12.39 ENCOUNTER FOR OTHER SCREENING FOR MALIGNANT NEOPLASM OF BREAST: ICD-10-CM

## 2024-06-17 DIAGNOSIS — F33.9 MAJOR DEPRESSIVE DISORDER, RECURRENT, UNSPECIFIED: ICD-10-CM

## 2024-06-17 DIAGNOSIS — E03.9 HYPOTHYROIDISM, UNSPECIFIED: ICD-10-CM

## 2024-06-17 DIAGNOSIS — E04.1 NONTOXIC SINGLE THYROID NODULE: ICD-10-CM

## 2024-06-17 DIAGNOSIS — E78.5 HYPERLIPIDEMIA, UNSPECIFIED: ICD-10-CM

## 2024-06-17 DIAGNOSIS — I10 ESSENTIAL (PRIMARY) HYPERTENSION: ICD-10-CM

## 2024-06-17 PROCEDURE — 36415 COLL VENOUS BLD VENIPUNCTURE: CPT

## 2024-06-17 PROCEDURE — G0439: CPT

## 2024-06-17 RX ORDER — KETOCONAZOLE 20 MG/G
2 CREAM TOPICAL TWICE DAILY
Qty: 1 | Refills: 1 | Status: ACTIVE | COMMUNITY
Start: 2024-01-25 | End: 1900-01-01

## 2024-06-17 NOTE — HISTORY OF PRESENT ILLNESS
[FreeTextEntry1] : physical exam  [de-identified] : Ms. MAR MERIDA is a 83 year female with a PMH of HTN, HLD, DM JAVON on CPAP comes to the office for physical exam. Patient denies fever, cough SOB. No other complaints at this time.

## 2024-06-17 NOTE — PLAN
[FreeTextEntry1] : In regards to patients Physical exam, routine blood work drawn, will review results with patient.  HTN- patient's BP well controlled with current medication. will continue current  regimen  patient will continue to follow with Cardiologist Dr. Gonzales.   Depression- symptoms well controlled with Lexapro 20 mg PO QD, patient denies suicidal ideation   DM- Patient will continue Metformin 1000 mg PO BID and Glimepiride 2 mg PO QD, patient will follow with Endocrinologist   In regards to patient's hypothyroidism, will re-test Thyroid levels today and adjust thyroid medication as indicated   JAVON on CPAP- patient will continue to follow with Pulmonologist   Chronic lower back pain- patient will continue to see Pain management Dr. Kevin at [facility].

## 2024-06-17 NOTE — REASON FOR VISIT
[Annual Wellness Visit] : an annual wellness visit [FreeTextEntry1] : patient is here for her annual physical , patient states that overall she is well she has been having trouble sleeping

## 2024-06-17 NOTE — HEALTH RISK ASSESSMENT
[0] : 2) Feeling down, depressed, or hopeless: Not at all (0) [PHQ-2 Negative - No further assessment needed] : PHQ-2 Negative - No further assessment needed [[id number]] : 0

## 2024-06-17 NOTE — HEALTH RISK ASSESSMENT
[Yes] : Yes [Monthly or less (1 pt)] : Monthly or less (1 point) [1 or 2 (0 pts)] : 1 or 2 (0 points) [Never (0 pts)] : Never (0 points) [No] : In the past 12 months have you used drugs other than those required for medical reasons? No [0] : 2) Feeling down, depressed, or hopeless: Not at all (0) [PHQ-2 Negative - No further assessment needed] : PHQ-2 Negative - No further assessment needed [I have developed a follow-up plan documented below in the note.] : I have developed a follow-up plan documented below in the note. [Audit-CScore] : 1 [[id number]] : 0 [Former] : Former [15-19] : 15-19 [> 15 Years] : > 15 Years [Patient declined Low Dose CT Scan] : Patient declined Low Dose CT Scan [Patient declined Retinal Exam] : Patient declined Retinal Exam. [Patient reported colonoscopy was normal] : Patient reported colonoscopy was normal [HIV test declined] : HIV test declined [Hepatitis C test declined] : Hepatitis C test declined [ColonoscopyDate] : 2019

## 2024-06-18 LAB
25(OH)D3 SERPL-MCNC: 39.9 NG/ML
ALBUMIN SERPL ELPH-MCNC: 4.4 G/DL
ALP BLD-CCNC: 88 U/L
ALT SERPL-CCNC: 19 U/L
ANION GAP SERPL CALC-SCNC: 16 MMOL/L
AST SERPL-CCNC: 19 U/L
BASOPHILS # BLD AUTO: 0.12 K/UL
BASOPHILS NFR BLD AUTO: 1.3 %
BILIRUB SERPL-MCNC: 0.2 MG/DL
BUN SERPL-MCNC: 21 MG/DL
CALCIUM SERPL-MCNC: 10 MG/DL
CHLORIDE SERPL-SCNC: 106 MMOL/L
CHOLEST SERPL-MCNC: 159 MG/DL
CO2 SERPL-SCNC: 19 MMOL/L
CREAT SERPL-MCNC: 1.09 MG/DL
EGFR: 51 ML/MIN/1.73M2
EOSINOPHIL # BLD AUTO: 0.43 K/UL
EOSINOPHIL NFR BLD AUTO: 4.5 %
ESTIMATED AVERAGE GLUCOSE: 140 MG/DL
GLUCOSE SERPL-MCNC: 130 MG/DL
HBA1C MFR BLD HPLC: 6.5 %
HCT VFR BLD CALC: 40.9 %
HDLC SERPL-MCNC: 57 MG/DL
HGB BLD-MCNC: 12.6 G/DL
IMM GRANULOCYTES NFR BLD AUTO: 0.3 %
LDLC SERPL CALC-MCNC: 82 MG/DL
LYMPHOCYTES # BLD AUTO: 2.47 K/UL
LYMPHOCYTES NFR BLD AUTO: 25.9 %
MAGNESIUM SERPL-MCNC: 1.8 MG/DL
MAN DIFF?: NORMAL
MCHC RBC-ENTMCNC: 29 PG
MCHC RBC-ENTMCNC: 30.8 GM/DL
MCV RBC AUTO: 94.2 FL
MONOCYTES # BLD AUTO: 0.73 K/UL
MONOCYTES NFR BLD AUTO: 7.6 %
NEUTROPHILS # BLD AUTO: 5.77 K/UL
NEUTROPHILS NFR BLD AUTO: 60.4 %
NONHDLC SERPL-MCNC: 102 MG/DL
PLATELET # BLD AUTO: 325 K/UL
POTASSIUM SERPL-SCNC: 4.8 MMOL/L
PROT SERPL-MCNC: 7.5 G/DL
RBC # BLD: 4.34 M/UL
RBC # FLD: 15.1 %
SODIUM SERPL-SCNC: 140 MMOL/L
TRIGL SERPL-MCNC: 113 MG/DL
TSH SERPL-ACNC: 1.76 UIU/ML
WBC # FLD AUTO: 9.55 K/UL

## 2024-06-18 RX ORDER — SEMAGLUTIDE 0.68 MG/ML
2 INJECTION, SOLUTION SUBCUTANEOUS
Qty: 3 | Refills: 3 | Status: ACTIVE | COMMUNITY
Start: 2024-04-04 | End: 1900-01-01

## 2024-07-09 ENCOUNTER — APPOINTMENT (OUTPATIENT)
Dept: PULMONOLOGY | Facility: CLINIC | Age: 84
End: 2024-07-09
Payer: MEDICARE

## 2024-07-09 VITALS
HEART RATE: 63 BPM | OXYGEN SATURATION: 95 % | DIASTOLIC BLOOD PRESSURE: 72 MMHG | SYSTOLIC BLOOD PRESSURE: 146 MMHG | WEIGHT: 263 LBS | RESPIRATION RATE: 16 BRPM | BODY MASS INDEX: 44.9 KG/M2 | HEIGHT: 64 IN

## 2024-07-09 DIAGNOSIS — R06.83 SNORING: ICD-10-CM

## 2024-07-09 DIAGNOSIS — G47.33 OBSTRUCTIVE SLEEP APNEA (ADULT) (PEDIATRIC): ICD-10-CM

## 2024-07-09 DIAGNOSIS — R94.2 ABNORMAL RESULTS OF PULMONARY FUNCTION STUDIES: ICD-10-CM

## 2024-07-09 DIAGNOSIS — E66.01 MORBID (SEVERE) OBESITY DUE TO EXCESS CALORIES: ICD-10-CM

## 2024-07-09 DIAGNOSIS — R06.02 SHORTNESS OF BREATH: ICD-10-CM

## 2024-07-09 DIAGNOSIS — R09.02 HYPOXEMIA: ICD-10-CM

## 2024-07-09 DIAGNOSIS — Z87.891 PERSONAL HISTORY OF NICOTINE DEPENDENCE: ICD-10-CM

## 2024-07-09 PROCEDURE — G2211 COMPLEX E/M VISIT ADD ON: CPT

## 2024-07-09 PROCEDURE — 99214 OFFICE O/P EST MOD 30 MIN: CPT

## 2024-07-10 ENCOUNTER — APPOINTMENT (OUTPATIENT)
Dept: ORTHOPEDIC SURGERY | Facility: CLINIC | Age: 84
End: 2024-07-10
Payer: MEDICARE

## 2024-07-10 VITALS
DIASTOLIC BLOOD PRESSURE: 78 MMHG | WEIGHT: 263 LBS | HEIGHT: 64 IN | HEART RATE: 88 BPM | SYSTOLIC BLOOD PRESSURE: 146 MMHG | BODY MASS INDEX: 44.9 KG/M2

## 2024-07-10 DIAGNOSIS — Z96.652 AFTERCARE FOLLOWING JOINT REPLACEMENT SURGERY: ICD-10-CM

## 2024-07-10 DIAGNOSIS — Z47.1 AFTERCARE FOLLOWING JOINT REPLACEMENT SURGERY: ICD-10-CM

## 2024-07-10 DIAGNOSIS — Z96.651 PRESENCE OF RIGHT ARTIFICIAL KNEE JOINT: ICD-10-CM

## 2024-07-10 DIAGNOSIS — Z96.651 AFTERCARE FOLLOWING JOINT REPLACEMENT SURGERY: ICD-10-CM

## 2024-07-10 DIAGNOSIS — Z96.652 PRESENCE OF LEFT ARTIFICIAL KNEE JOINT: ICD-10-CM

## 2024-07-10 DIAGNOSIS — M70.51 OTHER BURSITIS OF KNEE, RIGHT KNEE: ICD-10-CM

## 2024-07-10 DIAGNOSIS — M70.52 OTHER BURSITIS OF KNEE, RIGHT KNEE: ICD-10-CM

## 2024-07-10 PROCEDURE — 20610 DRAIN/INJ JOINT/BURSA W/O US: CPT | Mod: 50

## 2024-07-10 PROCEDURE — 99214 OFFICE O/P EST MOD 30 MIN: CPT | Mod: 25

## 2024-07-18 ENCOUNTER — OFFICE (OUTPATIENT)
Dept: URBAN - METROPOLITAN AREA CLINIC 104 | Facility: CLINIC | Age: 84
Setting detail: OPHTHALMOLOGY
End: 2024-07-18
Payer: MEDICARE

## 2024-07-18 DIAGNOSIS — H01.002: ICD-10-CM

## 2024-07-18 DIAGNOSIS — H04.123: ICD-10-CM

## 2024-07-18 DIAGNOSIS — H01.001: ICD-10-CM

## 2024-07-18 PROCEDURE — 92014 COMPRE OPH EXAM EST PT 1/>: CPT | Performed by: OPTOMETRIST

## 2024-07-18 ASSESSMENT — CONFRONTATIONAL VISUAL FIELD TEST (CVF)
OD_FINDINGS: FULL
OS_FINDINGS: FULL

## 2024-07-18 ASSESSMENT — LID EXAM ASSESSMENTS
OS_BLEPHARITIS: LLL LUL 2+
OD_BLEPHARITIS: RLL RUL 2+

## 2024-08-16 ENCOUNTER — APPOINTMENT (OUTPATIENT)
Dept: CARDIOLOGY | Facility: CLINIC | Age: 84
End: 2024-08-16

## 2024-08-16 ENCOUNTER — NON-APPOINTMENT (OUTPATIENT)
Age: 84
End: 2024-08-16

## 2024-08-16 VITALS
DIASTOLIC BLOOD PRESSURE: 84 MMHG | WEIGHT: 260 LBS | OXYGEN SATURATION: 95 % | SYSTOLIC BLOOD PRESSURE: 130 MMHG | HEART RATE: 80 BPM | HEIGHT: 64 IN | BODY MASS INDEX: 44.39 KG/M2

## 2024-08-16 DIAGNOSIS — I44.7 LEFT BUNDLE-BRANCH BLOCK, UNSPECIFIED: ICD-10-CM

## 2024-08-16 DIAGNOSIS — I42.8 OTHER CARDIOMYOPATHIES: ICD-10-CM

## 2024-08-16 DIAGNOSIS — E11.40 TYPE 2 DIABETES MELLITUS WITH DIABETIC NEUROPATHY, UNSPECIFIED: ICD-10-CM

## 2024-08-16 PROCEDURE — 99213 OFFICE O/P EST LOW 20 MIN: CPT

## 2024-08-16 PROCEDURE — 93000 ELECTROCARDIOGRAM COMPLETE: CPT

## 2024-08-16 NOTE — REVIEW OF SYSTEMS
[Weight Loss (___ Lbs)] : [unfilled] ~Ulb weight loss [Dyspnea on exertion] : dyspnea during exertion [Negative] : Heme/Lymph [FreeTextEntry9] : see HPI

## 2024-08-16 NOTE — HISTORY OF PRESENT ILLNESS
[FreeTextEntry1] : 83 year old female with a history of DM, HTN, HLD, LBBB, JAVON (Uses CPAP nightly), obesity, and hypothyroidism. She presents for routine follow up. She is s/p left knee replacement last year. She tolerated well. She has chronic exertional dyspnea which she states is slightly improved. She lost ~17 lbs over the last few months feels this is helping her breathing. Denies chest pain, palpitations, near syncope or syncope. Has chronic LE edema L>R. LHC done 7/15/16 revealed normal coronaries. NST done 6/2019 showed no ischemia and LVEF 48%. TTE done 2/17/2023 showed LVEF 50% and mild PH and aortic sclerosis. She is stable and doing well from the cardiac perspective.  She still notes some exertional dyspnea but had not changed.  She is taking the torsemide once or twice a week which is managing the edema.  She has lost 7 lbs and lipid profile remains stable.

## 2024-08-16 NOTE — PHYSICAL EXAM
[Well Developed] : well developed [Well Nourished] : well nourished [No Acute Distress] : no acute distress [Normal Conjunctiva] : normal conjunctiva [Normal Venous Pressure] : normal venous pressure [No Carotid Bruit] : no carotid bruit [Normal S1, S2] : normal S1, S2 [No Murmur] : no murmur [No Rub] : no rub [Clear Lung Fields] : clear lung fields [Good Air Entry] : good air entry [No Respiratory Distress] : no respiratory distress  [Soft] : abdomen soft [Non Tender] : non-tender [Normal Bowel Sounds] : normal bowel sounds [Normal Gait] : normal gait [Venous varicosities] : venous varicosities [No Rash] : no rash [Moves all extremities] : moves all extremities [No Focal Deficits] : no focal deficits [Alert and Oriented] : alert and oriented [Normal memory] : normal memory [de-identified] : trace edema - much improved compared to the past

## 2024-08-21 ENCOUNTER — APPOINTMENT (OUTPATIENT)
Dept: ENDOCRINOLOGY | Facility: CLINIC | Age: 84
End: 2024-08-21

## 2024-08-21 ENCOUNTER — RX RENEWAL (OUTPATIENT)
Age: 84
End: 2024-08-21

## 2024-09-04 ENCOUNTER — APPOINTMENT (OUTPATIENT)
Dept: ORTHOPEDIC SURGERY | Facility: CLINIC | Age: 84
End: 2024-09-04

## 2024-09-04 VITALS
DIASTOLIC BLOOD PRESSURE: 80 MMHG | WEIGHT: 260 LBS | HEIGHT: 65 IN | BODY MASS INDEX: 43.32 KG/M2 | SYSTOLIC BLOOD PRESSURE: 143 MMHG | HEART RATE: 86 BPM | TEMPERATURE: 98.1 F

## 2024-09-04 DIAGNOSIS — M25.532 PAIN IN LEFT WRIST: ICD-10-CM

## 2024-09-04 DIAGNOSIS — M65.9 SYNOVITIS AND TENOSYNOVITIS, UNSPECIFIED: ICD-10-CM

## 2024-09-04 DIAGNOSIS — M19.039 PRIMARY OSTEOARTHRITIS, UNSPECIFIED WRIST: ICD-10-CM

## 2024-09-04 DIAGNOSIS — M10.9 GOUT, UNSPECIFIED: ICD-10-CM

## 2024-09-04 PROCEDURE — 73130 X-RAY EXAM OF HAND: CPT | Mod: 50

## 2024-09-04 PROCEDURE — 20550 NJX 1 TENDON SHEATH/LIGAMENT: CPT | Mod: LT

## 2024-09-04 PROCEDURE — 20605 DRAIN/INJ JOINT/BURSA W/O US: CPT | Mod: 59,LT

## 2024-09-04 PROCEDURE — 99215 OFFICE O/P EST HI 40 MIN: CPT | Mod: 25

## 2024-09-04 NOTE — ASSESSMENT
[FreeTextEntry1] : ASSESSMENT: [The patient was accompanied today and was assisted with explaining their complaints today.] The patient comes in today with possible chronic history of gout?  Recently inflamed in the last few weeks with significant swelling and discomfort.  At this point we have discussed findings which are low likelihood infection.  We have discussed treatment options I do recommend bracing.  She does elect for injections.   The patient was adequately and thoroughly informed of my assessment of their current condition(s).  - This may diminish bodily function for the extremity. We discussed prognosis, tx modalities including operative and nonoperative options for the above diagnostic assessment. As always, 2nd opinion is always provided as an option.  When accessible, I was able to review other physicians note(s) including reviewing other tests, imaging results as well as personally view these results for my own interpretation.   Injection:   The risks and benefits of a steroid injection were discussed in detail. The risks include but are not limited to: pain, infection, swelling, flare response, bleeding, subcutaneous fat atrophy, skin depigmentation and/or elevation of blood sugar. The risk of incomplete resolution of symptoms, recurrence and additional intervention was reviewed and considered by the patient. The patient agreed to proceed and under a sterile prep, I injected 1 unit 6mg into 1 cc of a combination of Celestone and Lidocaine into the left wrist joint, left wrist common extensor subs sheath. The patient tolerated the injection well.  The patient was adequately and thoroughly informed of my assessment of their current condition(s).  DISCUSSION: 1.  Injections as above.  Bracing as needed.  Follow-up 1 month.  Gout?  It is quite severe 2. [x] 3. [x]

## 2024-09-04 NOTE — REASON FOR VISIT
[Initial Visit] : an initial visit for [Hand Pain] : hand pain [Wrist Pain] : wrist pain [Spouse] : spouse

## 2024-09-04 NOTE — PHYSICAL EXAM
[de-identified] : Examination of the left wrist reveals some radiocarpal tenderness bogginess and swelling.  No signs of infection.  He has more of an inflammatory type presentation. There is tenderness with compression of the overlying extensor tendons. [de-identified] : [4] views of [bilateral hands and wrists] were obtained today in my office and were seen by me and discussed with the patient.  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

## 2024-09-04 NOTE — HISTORY OF PRESENT ILLNESS
[FreeTextEntry1] : Cat is a pleasant 83-year-old female presenting today with sudden onset of left wrist discomfort and swelling tremendously status post lifting maneuver.

## 2024-10-11 ENCOUNTER — APPOINTMENT (OUTPATIENT)
Dept: ORTHOPEDIC SURGERY | Facility: CLINIC | Age: 84
End: 2024-10-11

## 2024-10-16 ENCOUNTER — APPOINTMENT (OUTPATIENT)
Dept: ORTHOPEDIC SURGERY | Facility: CLINIC | Age: 84
End: 2024-10-16
Payer: MEDICARE

## 2024-10-16 VITALS
SYSTOLIC BLOOD PRESSURE: 126 MMHG | BODY MASS INDEX: 43.32 KG/M2 | HEIGHT: 65 IN | DIASTOLIC BLOOD PRESSURE: 72 MMHG | WEIGHT: 260 LBS

## 2024-10-16 DIAGNOSIS — M70.52 OTHER BURSITIS OF KNEE, RIGHT KNEE: ICD-10-CM

## 2024-10-16 DIAGNOSIS — Z96.652 AFTERCARE FOLLOWING JOINT REPLACEMENT SURGERY: ICD-10-CM

## 2024-10-16 DIAGNOSIS — Z47.1 AFTERCARE FOLLOWING JOINT REPLACEMENT SURGERY: ICD-10-CM

## 2024-10-16 DIAGNOSIS — Z96.651 AFTERCARE FOLLOWING JOINT REPLACEMENT SURGERY: ICD-10-CM

## 2024-10-16 DIAGNOSIS — M70.51 OTHER BURSITIS OF KNEE, RIGHT KNEE: ICD-10-CM

## 2024-10-16 PROCEDURE — 99214 OFFICE O/P EST MOD 30 MIN: CPT | Mod: 25

## 2024-10-16 PROCEDURE — 20610 DRAIN/INJ JOINT/BURSA W/O US: CPT | Mod: 50

## 2024-11-21 ENCOUNTER — APPOINTMENT (OUTPATIENT)
Dept: ENDOCRINOLOGY | Facility: CLINIC | Age: 84
End: 2024-11-21
Payer: MEDICARE

## 2024-11-21 VITALS
HEIGHT: 65 IN | BODY MASS INDEX: 43.49 KG/M2 | DIASTOLIC BLOOD PRESSURE: 68 MMHG | OXYGEN SATURATION: 93 % | WEIGHT: 261 LBS | SYSTOLIC BLOOD PRESSURE: 140 MMHG | HEART RATE: 91 BPM

## 2024-11-21 DIAGNOSIS — E04.2 NONTOXIC MULTINODULAR GOITER: ICD-10-CM

## 2024-11-21 DIAGNOSIS — E11.40 TYPE 2 DIABETES MELLITUS WITH DIABETIC NEUROPATHY, UNSPECIFIED: ICD-10-CM

## 2024-11-21 DIAGNOSIS — E03.9 HYPOTHYROIDISM, UNSPECIFIED: ICD-10-CM

## 2024-11-21 DIAGNOSIS — E78.5 HYPERLIPIDEMIA, UNSPECIFIED: ICD-10-CM

## 2024-11-21 DIAGNOSIS — Z13.820 ENCOUNTER FOR SCREENING FOR OSTEOPOROSIS: ICD-10-CM

## 2024-11-21 PROCEDURE — 99214 OFFICE O/P EST MOD 30 MIN: CPT

## 2024-11-21 PROCEDURE — G2211 COMPLEX E/M VISIT ADD ON: CPT

## 2024-12-05 ENCOUNTER — RX RENEWAL (OUTPATIENT)
Age: 84
End: 2024-12-05

## 2024-12-18 ENCOUNTER — RX RENEWAL (OUTPATIENT)
Age: 84
End: 2024-12-18

## 2025-01-16 ENCOUNTER — APPOINTMENT (OUTPATIENT)
Dept: PULMONOLOGY | Facility: CLINIC | Age: 85
End: 2025-01-16
Payer: MEDICARE

## 2025-01-16 VITALS
HEART RATE: 78 BPM | HEIGHT: 64.5 IN | DIASTOLIC BLOOD PRESSURE: 68 MMHG | SYSTOLIC BLOOD PRESSURE: 126 MMHG | BODY MASS INDEX: 43.68 KG/M2 | WEIGHT: 259 LBS | RESPIRATION RATE: 16 BRPM | OXYGEN SATURATION: 92 %

## 2025-01-16 DIAGNOSIS — E66.01 MORBID (SEVERE) OBESITY DUE TO EXCESS CALORIES: ICD-10-CM

## 2025-01-16 DIAGNOSIS — G47.33 OBSTRUCTIVE SLEEP APNEA (ADULT) (PEDIATRIC): ICD-10-CM

## 2025-01-16 DIAGNOSIS — Z87.891 PERSONAL HISTORY OF NICOTINE DEPENDENCE: ICD-10-CM

## 2025-01-16 DIAGNOSIS — R06.83 SNORING: ICD-10-CM

## 2025-01-16 DIAGNOSIS — R94.2 ABNORMAL RESULTS OF PULMONARY FUNCTION STUDIES: ICD-10-CM

## 2025-01-16 DIAGNOSIS — R06.02 SHORTNESS OF BREATH: ICD-10-CM

## 2025-01-16 DIAGNOSIS — R09.02 HYPOXEMIA: ICD-10-CM

## 2025-01-16 PROCEDURE — 99214 OFFICE O/P EST MOD 30 MIN: CPT

## 2025-01-16 PROCEDURE — G2211 COMPLEX E/M VISIT ADD ON: CPT

## 2025-01-29 ENCOUNTER — APPOINTMENT (OUTPATIENT)
Dept: ORTHOPEDIC SURGERY | Facility: CLINIC | Age: 85
End: 2025-01-29
Payer: MEDICARE

## 2025-01-29 VITALS
SYSTOLIC BLOOD PRESSURE: 147 MMHG | WEIGHT: 259 LBS | DIASTOLIC BLOOD PRESSURE: 88 MMHG | BODY MASS INDEX: 43.68 KG/M2 | HEIGHT: 64.5 IN

## 2025-01-29 DIAGNOSIS — G47.33 OBSTRUCTIVE SLEEP APNEA (ADULT) (PEDIATRIC): ICD-10-CM

## 2025-01-29 DIAGNOSIS — Z96.651 AFTERCARE FOLLOWING JOINT REPLACEMENT SURGERY: ICD-10-CM

## 2025-01-29 DIAGNOSIS — Z47.1 AFTERCARE FOLLOWING JOINT REPLACEMENT SURGERY: ICD-10-CM

## 2025-01-29 DIAGNOSIS — M70.51 OTHER BURSITIS OF KNEE, RIGHT KNEE: ICD-10-CM

## 2025-01-29 DIAGNOSIS — M70.52 OTHER BURSITIS OF KNEE, RIGHT KNEE: ICD-10-CM

## 2025-01-29 DIAGNOSIS — Z96.652 AFTERCARE FOLLOWING JOINT REPLACEMENT SURGERY: ICD-10-CM

## 2025-01-29 PROCEDURE — 20610 DRAIN/INJ JOINT/BURSA W/O US: CPT | Mod: 50

## 2025-01-29 PROCEDURE — 99214 OFFICE O/P EST MOD 30 MIN: CPT | Mod: 25

## 2025-01-30 ENCOUNTER — OFFICE (OUTPATIENT)
Dept: URBAN - METROPOLITAN AREA CLINIC 104 | Facility: CLINIC | Age: 85
Setting detail: OPHTHALMOLOGY
End: 2025-01-30
Payer: MEDICARE

## 2025-01-30 DIAGNOSIS — H01.004: ICD-10-CM

## 2025-01-30 DIAGNOSIS — H01.001: ICD-10-CM

## 2025-01-30 DIAGNOSIS — H04.123: ICD-10-CM

## 2025-01-30 DIAGNOSIS — Z79.84: ICD-10-CM

## 2025-01-30 DIAGNOSIS — H01.005: ICD-10-CM

## 2025-01-30 DIAGNOSIS — E11.9: ICD-10-CM

## 2025-01-30 DIAGNOSIS — H01.002: ICD-10-CM

## 2025-01-30 DIAGNOSIS — H43.813: ICD-10-CM

## 2025-01-30 DIAGNOSIS — Z96.1: ICD-10-CM

## 2025-01-30 PROCEDURE — 92014 COMPRE OPH EXAM EST PT 1/>: CPT | Performed by: SPECIALIST

## 2025-01-30 ASSESSMENT — REFRACTION_MANIFEST
OS_ADD: +2.50
OS_CYLINDER: -2.50
OS_AXIS: 084
OD_ADD: +2.50
OD_SPHERE: +2.50
OS_SPHERE: +2.00
OS_VA1: 20/25
OD_VA1: 20/25
OD_AXIS: 095
OD_CYLINDER: -3.25

## 2025-01-30 ASSESSMENT — REFRACTION_CURRENTRX
OD_ADD: +2.50
OD_CYLINDER: -3.00
OD_AXIS: 093
OD_SPHERE: +2.25
OS_SPHERE: +2.00
OS_CYLINDER: -2.25
OS_OVR_VA: 20/
OD_VPRISM_DIRECTION: PROGS
OS_ADD: +2.50
OD_OVR_VA: 20/
OS_VPRISM_DIRECTION: PROGS
OS_AXIS: 082

## 2025-01-30 ASSESSMENT — TONOMETRY
OD_IOP_MMHG: 16
OS_IOP_MMHG: 16

## 2025-01-30 ASSESSMENT — LID EXAM ASSESSMENTS
OS_BLEPHARITIS: LLL LUL 2+
OD_BLEPHARITIS: RLL RUL 2+

## 2025-01-30 ASSESSMENT — CONFRONTATIONAL VISUAL FIELD TEST (CVF)
OS_FINDINGS: FULL
OD_FINDINGS: FULL

## 2025-01-30 ASSESSMENT — VISUAL ACUITY
OD_BCVA: 20/20
OS_BCVA: 20/25

## 2025-01-30 ASSESSMENT — REFRACTION_AUTOREFRACTION
OS_CYLINDER: -1.75
OS_SPHERE: +1.75
OS_AXIS: 081
OD_SPHERE: +3.00
OD_CYLINDER: -3.25
OD_AXIS: 096

## 2025-01-30 ASSESSMENT — TEAR BREAK UP TIME (TBUT)
OD_TBUT: 2+
OS_TBUT: 2+

## 2025-01-30 ASSESSMENT — KERATOMETRY: METHOD_AUTO_MANUAL: AUTO

## 2025-02-24 ENCOUNTER — NON-APPOINTMENT (OUTPATIENT)
Age: 85
End: 2025-02-24

## 2025-02-24 ENCOUNTER — APPOINTMENT (OUTPATIENT)
Dept: ENDOCRINOLOGY | Facility: CLINIC | Age: 85
End: 2025-02-24
Payer: MEDICARE

## 2025-02-24 VITALS — OXYGEN SATURATION: 94 % | BODY MASS INDEX: 43.17 KG/M2 | WEIGHT: 256 LBS | HEART RATE: 85 BPM | HEIGHT: 64.5 IN

## 2025-02-24 VITALS — DIASTOLIC BLOOD PRESSURE: 70 MMHG | SYSTOLIC BLOOD PRESSURE: 128 MMHG

## 2025-02-24 DIAGNOSIS — E04.2 NONTOXIC MULTINODULAR GOITER: ICD-10-CM

## 2025-02-24 DIAGNOSIS — E78.5 HYPERLIPIDEMIA, UNSPECIFIED: ICD-10-CM

## 2025-02-24 DIAGNOSIS — E04.1 NONTOXIC SINGLE THYROID NODULE: ICD-10-CM

## 2025-02-24 DIAGNOSIS — E11.40 TYPE 2 DIABETES MELLITUS WITH DIABETIC NEUROPATHY, UNSPECIFIED: ICD-10-CM

## 2025-02-24 DIAGNOSIS — E55.9 VITAMIN D DEFICIENCY, UNSPECIFIED: ICD-10-CM

## 2025-02-24 DIAGNOSIS — E03.9 HYPOTHYROIDISM, UNSPECIFIED: ICD-10-CM

## 2025-02-24 DIAGNOSIS — I10 ESSENTIAL (PRIMARY) HYPERTENSION: ICD-10-CM

## 2025-02-24 DIAGNOSIS — Z13.820 ENCOUNTER FOR SCREENING FOR OSTEOPOROSIS: ICD-10-CM

## 2025-02-24 DIAGNOSIS — E66.01 MORBID (SEVERE) OBESITY DUE TO EXCESS CALORIES: ICD-10-CM

## 2025-02-24 PROCEDURE — 99214 OFFICE O/P EST MOD 30 MIN: CPT

## 2025-03-19 ENCOUNTER — RX RENEWAL (OUTPATIENT)
Age: 85
End: 2025-03-19

## 2025-03-21 ENCOUNTER — NON-APPOINTMENT (OUTPATIENT)
Age: 85
End: 2025-03-21

## 2025-03-21 ENCOUNTER — APPOINTMENT (OUTPATIENT)
Dept: CARDIOLOGY | Facility: CLINIC | Age: 85
End: 2025-03-21

## 2025-03-21 VITALS
SYSTOLIC BLOOD PRESSURE: 148 MMHG | HEART RATE: 78 BPM | DIASTOLIC BLOOD PRESSURE: 76 MMHG | HEIGHT: 64.5 IN | OXYGEN SATURATION: 92 % | BODY MASS INDEX: 43.34 KG/M2 | WEIGHT: 257 LBS

## 2025-03-21 DIAGNOSIS — E78.5 HYPERLIPIDEMIA, UNSPECIFIED: ICD-10-CM

## 2025-03-21 DIAGNOSIS — F33.9 MAJOR DEPRESSIVE DISORDER, RECURRENT, UNSPECIFIED: ICD-10-CM

## 2025-03-21 DIAGNOSIS — E11.40 TYPE 2 DIABETES MELLITUS WITH DIABETIC NEUROPATHY, UNSPECIFIED: ICD-10-CM

## 2025-03-21 DIAGNOSIS — I44.7 LEFT BUNDLE-BRANCH BLOCK, UNSPECIFIED: ICD-10-CM

## 2025-03-21 PROCEDURE — 93000 ELECTROCARDIOGRAM COMPLETE: CPT

## 2025-03-21 PROCEDURE — 99213 OFFICE O/P EST LOW 20 MIN: CPT

## 2025-03-21 RX ORDER — SEMAGLUTIDE 0.25 MG/.5ML
0.25 INJECTION, SOLUTION SUBCUTANEOUS
Qty: 1 | Refills: 3 | Status: ACTIVE | COMMUNITY
Start: 2025-03-21 | End: 1900-01-01

## 2025-04-04 ENCOUNTER — APPOINTMENT (OUTPATIENT)
Dept: ORTHOPEDIC SURGERY | Facility: CLINIC | Age: 85
End: 2025-04-04
Payer: MEDICARE

## 2025-04-04 DIAGNOSIS — Z96.652 AFTERCARE FOLLOWING JOINT REPLACEMENT SURGERY: ICD-10-CM

## 2025-04-04 DIAGNOSIS — Z96.651 AFTERCARE FOLLOWING JOINT REPLACEMENT SURGERY: ICD-10-CM

## 2025-04-04 DIAGNOSIS — M70.51 OTHER BURSITIS OF KNEE, RIGHT KNEE: ICD-10-CM

## 2025-04-04 DIAGNOSIS — Z47.1 AFTERCARE FOLLOWING JOINT REPLACEMENT SURGERY: ICD-10-CM

## 2025-04-04 DIAGNOSIS — M70.52 OTHER BURSITIS OF KNEE, RIGHT KNEE: ICD-10-CM

## 2025-04-04 PROCEDURE — 20610 DRAIN/INJ JOINT/BURSA W/O US: CPT | Mod: 50

## 2025-04-04 PROCEDURE — 99214 OFFICE O/P EST MOD 30 MIN: CPT | Mod: 25

## 2025-05-13 ENCOUNTER — NON-APPOINTMENT (OUTPATIENT)
Age: 85
End: 2025-05-13

## 2025-05-20 ENCOUNTER — APPOINTMENT (OUTPATIENT)
Dept: ENDOCRINOLOGY | Facility: CLINIC | Age: 85
End: 2025-05-20
Payer: MEDICARE

## 2025-05-20 VITALS
HEIGHT: 64.5 IN | WEIGHT: 259 LBS | BODY MASS INDEX: 43.68 KG/M2 | SYSTOLIC BLOOD PRESSURE: 132 MMHG | HEART RATE: 102 BPM | DIASTOLIC BLOOD PRESSURE: 90 MMHG | OXYGEN SATURATION: 92 %

## 2025-05-20 DIAGNOSIS — M81.0 AGE-RELATED OSTEOPOROSIS W/OUT CURRENT PATHOLOGICAL FRACTURE: ICD-10-CM

## 2025-05-20 DIAGNOSIS — E11.40 TYPE 2 DIABETES MELLITUS WITH DIABETIC NEUROPATHY, UNSPECIFIED: ICD-10-CM

## 2025-05-20 DIAGNOSIS — E78.5 HYPERLIPIDEMIA, UNSPECIFIED: ICD-10-CM

## 2025-05-20 DIAGNOSIS — E55.9 VITAMIN D DEFICIENCY, UNSPECIFIED: ICD-10-CM

## 2025-05-20 DIAGNOSIS — E04.1 NONTOXIC SINGLE THYROID NODULE: ICD-10-CM

## 2025-05-20 DIAGNOSIS — E03.9 HYPOTHYROIDISM, UNSPECIFIED: ICD-10-CM

## 2025-05-20 DIAGNOSIS — I10 ESSENTIAL (PRIMARY) HYPERTENSION: ICD-10-CM

## 2025-05-20 DIAGNOSIS — E04.2 NONTOXIC MULTINODULAR GOITER: ICD-10-CM

## 2025-05-20 LAB — GLUCOSE BLDC GLUCOMTR-MCNC: 229

## 2025-05-20 PROCEDURE — 82962 GLUCOSE BLOOD TEST: CPT

## 2025-05-20 PROCEDURE — 99214 OFFICE O/P EST MOD 30 MIN: CPT

## 2025-05-27 LAB
ALBUMIN SERPL ELPH-MCNC: 3.9 G/DL
ALP BLD-CCNC: 91 U/L
ALT SERPL-CCNC: 18 U/L
ANION GAP SERPL CALC-SCNC: 15 MMOL/L
AST SERPL-CCNC: 19 U/L
BASOPHILS # BLD AUTO: 0.12 K/UL
BASOPHILS NFR BLD AUTO: 1.3 %
BILIRUB SERPL-MCNC: 0.4 MG/DL
BUN SERPL-MCNC: 15 MG/DL
CALCIUM SERPL-MCNC: 10 MG/DL
CHLORIDE SERPL-SCNC: 101 MMOL/L
CHOLEST SERPL-MCNC: 130 MG/DL
CO2 SERPL-SCNC: 24 MMOL/L
CREAT SERPL-MCNC: 1.07 MG/DL
EGFRCR SERPLBLD CKD-EPI 2021: 52 ML/MIN/1.73M2
EOSINOPHIL # BLD AUTO: 0.31 K/UL
EOSINOPHIL NFR BLD AUTO: 3.4 %
ESTIMATED AVERAGE GLUCOSE: 160 MG/DL
GLUCOSE SERPL-MCNC: 158 MG/DL
HBA1C MFR BLD HPLC: 7.2 %
HCT VFR BLD CALC: 37.3 %
HDLC SERPL-MCNC: 59 MG/DL
HGB BLD-MCNC: 11.6 G/DL
IMM GRANULOCYTES NFR BLD AUTO: 0.5 %
LDLC SERPL-MCNC: 56 MG/DL
LYMPHOCYTES # BLD AUTO: 2.54 K/UL
LYMPHOCYTES NFR BLD AUTO: 27.6 %
MAN DIFF?: NORMAL
MCHC RBC-ENTMCNC: 28.4 PG
MCHC RBC-ENTMCNC: 31.1 G/DL
MCV RBC AUTO: 91.2 FL
MONOCYTES # BLD AUTO: 0.6 K/UL
MONOCYTES NFR BLD AUTO: 6.5 %
NEUTROPHILS # BLD AUTO: 5.58 K/UL
NEUTROPHILS NFR BLD AUTO: 60.7 %
NONHDLC SERPL-MCNC: 71 MG/DL
PLATELET # BLD AUTO: 352 K/UL
POTASSIUM SERPL-SCNC: 4.3 MMOL/L
PROT SERPL-MCNC: 6.7 G/DL
RBC # BLD: 4.09 M/UL
RBC # FLD: 15 %
SODIUM SERPL-SCNC: 140 MMOL/L
T4 FREE SERPL-MCNC: 1.5 NG/DL
TRIGL SERPL-MCNC: 76 MG/DL
TSH SERPL-ACNC: 2.21 UIU/ML
WBC # FLD AUTO: 9.2 K/UL

## 2025-05-30 ENCOUNTER — APPOINTMENT (OUTPATIENT)
Dept: ORTHOPEDIC SURGERY | Facility: CLINIC | Age: 85
End: 2025-05-30
Payer: MEDICARE

## 2025-05-30 VITALS
DIASTOLIC BLOOD PRESSURE: 72 MMHG | HEIGHT: 64.5 IN | BODY MASS INDEX: 43.68 KG/M2 | SYSTOLIC BLOOD PRESSURE: 135 MMHG | WEIGHT: 259 LBS | HEART RATE: 87 BPM

## 2025-05-30 DIAGNOSIS — M77.50 OTHER ENTHESOPATHY OF UNSPCFD FOOT AND ANKLE: ICD-10-CM

## 2025-05-30 PROCEDURE — 99214 OFFICE O/P EST MOD 30 MIN: CPT | Mod: 25

## 2025-05-30 PROCEDURE — 20605 DRAIN/INJ JOINT/BURSA W/O US: CPT | Mod: RT

## 2025-05-30 PROCEDURE — 73610 X-RAY EXAM OF ANKLE: CPT | Mod: RT

## 2025-07-11 ENCOUNTER — APPOINTMENT (OUTPATIENT)
Dept: ORTHOPEDIC SURGERY | Facility: CLINIC | Age: 85
End: 2025-07-11

## 2025-07-16 ENCOUNTER — APPOINTMENT (OUTPATIENT)
Dept: ORTHOPEDIC SURGERY | Facility: CLINIC | Age: 85
End: 2025-07-16

## 2025-07-16 VITALS
DIASTOLIC BLOOD PRESSURE: 73 MMHG | BODY MASS INDEX: 43.68 KG/M2 | HEIGHT: 64.5 IN | SYSTOLIC BLOOD PRESSURE: 127 MMHG | WEIGHT: 259 LBS

## 2025-07-16 PROCEDURE — 99214 OFFICE O/P EST MOD 30 MIN: CPT | Mod: 25

## 2025-07-16 PROCEDURE — 20610 DRAIN/INJ JOINT/BURSA W/O US: CPT | Mod: 50

## 2025-07-21 ENCOUNTER — APPOINTMENT (OUTPATIENT)
Dept: PULMONOLOGY | Facility: CLINIC | Age: 85
End: 2025-07-21
Payer: MEDICARE

## 2025-07-21 VITALS
HEART RATE: 64 BPM | DIASTOLIC BLOOD PRESSURE: 64 MMHG | SYSTOLIC BLOOD PRESSURE: 128 MMHG | RESPIRATION RATE: 16 BRPM | OXYGEN SATURATION: 96 %

## 2025-07-21 DIAGNOSIS — Z87.891 PERSONAL HISTORY OF NICOTINE DEPENDENCE: ICD-10-CM

## 2025-07-21 DIAGNOSIS — R94.2 ABNORMAL RESULTS OF PULMONARY FUNCTION STUDIES: ICD-10-CM

## 2025-07-21 DIAGNOSIS — R09.02 HYPOXEMIA: ICD-10-CM

## 2025-07-21 DIAGNOSIS — E66.01 MORBID (SEVERE) OBESITY DUE TO EXCESS CALORIES: ICD-10-CM

## 2025-07-21 DIAGNOSIS — R06.83 SNORING: ICD-10-CM

## 2025-07-21 DIAGNOSIS — G47.33 OBSTRUCTIVE SLEEP APNEA (ADULT) (PEDIATRIC): ICD-10-CM

## 2025-07-21 DIAGNOSIS — R06.02 SHORTNESS OF BREATH: ICD-10-CM

## 2025-07-21 PROCEDURE — 85018 HEMOGLOBIN: CPT | Mod: QW

## 2025-07-21 PROCEDURE — 94727 GAS DIL/WSHOT DETER LNG VOL: CPT

## 2025-07-21 PROCEDURE — 94010 BREATHING CAPACITY TEST: CPT

## 2025-07-21 PROCEDURE — 94729 DIFFUSING CAPACITY: CPT

## 2025-07-21 PROCEDURE — 99214 OFFICE O/P EST MOD 30 MIN: CPT | Mod: 25

## 2025-07-30 ENCOUNTER — RX RENEWAL (OUTPATIENT)
Age: 85
End: 2025-07-30

## 2025-08-14 ENCOUNTER — APPOINTMENT (OUTPATIENT)
Dept: CARDIOLOGY | Facility: CLINIC | Age: 85
End: 2025-08-14
Payer: MEDICARE

## 2025-08-14 VITALS
DIASTOLIC BLOOD PRESSURE: 72 MMHG | BODY MASS INDEX: 41.82 KG/M2 | WEIGHT: 251 LBS | SYSTOLIC BLOOD PRESSURE: 132 MMHG | HEIGHT: 65 IN | HEART RATE: 68 BPM | OXYGEN SATURATION: 95 %

## 2025-08-14 DIAGNOSIS — N18.30 CHRONIC KIDNEY DISEASE, STAGE 3 UNSPECIFIED: ICD-10-CM

## 2025-08-14 DIAGNOSIS — I44.7 LEFT BUNDLE-BRANCH BLOCK, UNSPECIFIED: ICD-10-CM

## 2025-08-14 DIAGNOSIS — E11.40 TYPE 2 DIABETES MELLITUS WITH DIABETIC NEUROPATHY, UNSPECIFIED: ICD-10-CM

## 2025-08-14 DIAGNOSIS — I42.8 OTHER CARDIOMYOPATHIES: ICD-10-CM

## 2025-08-14 PROCEDURE — 93306 TTE W/DOPPLER COMPLETE: CPT

## 2025-08-14 PROCEDURE — 93000 ELECTROCARDIOGRAM COMPLETE: CPT

## 2025-08-14 PROCEDURE — 99213 OFFICE O/P EST LOW 20 MIN: CPT

## 2025-08-15 ENCOUNTER — NON-APPOINTMENT (OUTPATIENT)
Age: 85
End: 2025-08-15

## 2025-08-15 ENCOUNTER — APPOINTMENT (OUTPATIENT)
Dept: CARDIOLOGY | Facility: CLINIC | Age: 85
End: 2025-08-15

## 2025-09-04 ENCOUNTER — APPOINTMENT (OUTPATIENT)
Dept: CARDIOLOGY | Facility: CLINIC | Age: 85
End: 2025-09-04

## 2025-09-17 ENCOUNTER — APPOINTMENT (OUTPATIENT)
Dept: PULMONOLOGY | Facility: CLINIC | Age: 85
End: 2025-09-17
Payer: MEDICARE

## 2025-09-17 VITALS
OXYGEN SATURATION: 96 % | SYSTOLIC BLOOD PRESSURE: 122 MMHG | HEART RATE: 68 BPM | DIASTOLIC BLOOD PRESSURE: 82 MMHG | WEIGHT: 251 LBS | BODY MASS INDEX: 44.47 KG/M2 | HEIGHT: 63 IN

## 2025-09-17 VITALS — RESPIRATION RATE: 16 BRPM

## 2025-09-17 DIAGNOSIS — R06.02 SHORTNESS OF BREATH: ICD-10-CM

## 2025-09-17 DIAGNOSIS — E66.01 MORBID (SEVERE) OBESITY DUE TO EXCESS CALORIES: ICD-10-CM

## 2025-09-17 DIAGNOSIS — G47.33 OBSTRUCTIVE SLEEP APNEA (ADULT) (PEDIATRIC): ICD-10-CM

## 2025-09-17 DIAGNOSIS — R06.83 SNORING: ICD-10-CM

## 2025-09-17 DIAGNOSIS — Z87.891 PERSONAL HISTORY OF NICOTINE DEPENDENCE: ICD-10-CM

## 2025-09-17 DIAGNOSIS — R09.02 HYPOXEMIA: ICD-10-CM

## 2025-09-17 DIAGNOSIS — R94.2 ABNORMAL RESULTS OF PULMONARY FUNCTION STUDIES: ICD-10-CM

## 2025-09-17 PROCEDURE — G2211 COMPLEX E/M VISIT ADD ON: CPT

## 2025-09-17 PROCEDURE — 99214 OFFICE O/P EST MOD 30 MIN: CPT
